# Patient Record
Sex: FEMALE | Race: WHITE | Employment: OTHER | URBAN - METROPOLITAN AREA
[De-identification: names, ages, dates, MRNs, and addresses within clinical notes are randomized per-mention and may not be internally consistent; named-entity substitution may affect disease eponyms.]

---

## 2017-01-02 DIAGNOSIS — L70.0 ACNE COMEDONE: ICD-10-CM

## 2017-01-03 RX ORDER — DOXYCYCLINE HYCLATE 100 MG
TABLET ORAL
Qty: 30 TABLET | Refills: 0 | Status: SHIPPED | OUTPATIENT
Start: 2017-01-03 | End: 2017-02-28

## 2017-02-06 DIAGNOSIS — K59.00 CONSTIPATION, UNSPECIFIED CONSTIPATION TYPE: ICD-10-CM

## 2017-02-06 DIAGNOSIS — Z76.0 MEDICATION REFILL: ICD-10-CM

## 2017-02-06 RX ORDER — ETHYL ALCOHOL 62 %
GEL (ML) TOPICAL
Qty: 60 TABLET | Refills: 0 | Status: SHIPPED | OUTPATIENT
Start: 2017-02-06 | End: 2017-02-28 | Stop reason: SDUPTHER

## 2017-02-22 ENCOUNTER — HOSPITAL ENCOUNTER (OUTPATIENT)
Age: 52
Discharge: HOME | End: 2017-02-22
Payer: MEDICARE

## 2017-02-22 ENCOUNTER — OFFICE VISIT (OUTPATIENT)
Dept: PODIATRY | Facility: CLINIC | Age: 52
End: 2017-02-22

## 2017-02-22 VITALS
DIASTOLIC BLOOD PRESSURE: 74 MMHG | BODY MASS INDEX: 22.18 KG/M2 | HEART RATE: 61 BPM | WEIGHT: 138 LBS | TEMPERATURE: 99.2 F | HEIGHT: 66 IN | SYSTOLIC BLOOD PRESSURE: 101 MMHG

## 2017-02-22 DIAGNOSIS — M21.619 BUNION: Primary | ICD-10-CM

## 2017-02-22 DIAGNOSIS — M79.671 RIGHT FOOT PAIN: ICD-10-CM

## 2017-02-22 DIAGNOSIS — J44.9 CHRONIC OBSTRUCTIVE PULMONARY DISEASE, UNSPECIFIED COPD TYPE (HCC): ICD-10-CM

## 2017-02-22 PROCEDURE — G8484 FLU IMMUNIZE NO ADMIN: HCPCS | Performed by: PODIATRIST

## 2017-02-22 PROCEDURE — 99213 OFFICE O/P EST LOW 20 MIN: CPT | Performed by: PODIATRIST

## 2017-02-22 PROCEDURE — G8420 CALC BMI NORM PARAMETERS: HCPCS | Performed by: PODIATRIST

## 2017-02-22 PROCEDURE — G8926 SPIRO NO PERF OR DOC: HCPCS | Performed by: PODIATRIST

## 2017-02-22 PROCEDURE — 3023F SPIROM DOC REV: CPT | Performed by: PODIATRIST

## 2017-02-22 PROCEDURE — 99212 OFFICE O/P EST SF 10 MIN: CPT | Performed by: PODIATRIST

## 2017-02-22 PROCEDURE — 1036F TOBACCO NON-USER: CPT | Performed by: PODIATRIST

## 2017-02-22 PROCEDURE — G8427 DOCREV CUR MEDS BY ELIG CLIN: HCPCS | Performed by: PODIATRIST

## 2017-02-22 PROCEDURE — 3017F COLORECTAL CA SCREEN DOC REV: CPT | Performed by: PODIATRIST

## 2017-02-22 PROCEDURE — 3014F SCREEN MAMMO DOC REV: CPT | Performed by: PODIATRIST

## 2017-02-28 ENCOUNTER — OFFICE VISIT (OUTPATIENT)
Dept: FAMILY MEDICINE CLINIC | Facility: CLINIC | Age: 52
End: 2017-02-28

## 2017-02-28 ENCOUNTER — HOSPITAL ENCOUNTER (OUTPATIENT)
Age: 52
Discharge: HOME | End: 2017-02-28
Payer: MEDICARE

## 2017-02-28 VITALS
SYSTOLIC BLOOD PRESSURE: 107 MMHG | HEART RATE: 81 BPM | BODY MASS INDEX: 22.63 KG/M2 | WEIGHT: 140.8 LBS | DIASTOLIC BLOOD PRESSURE: 80 MMHG | TEMPERATURE: 98.2 F | HEIGHT: 66 IN

## 2017-02-28 DIAGNOSIS — G89.29 CHRONIC RIGHT-SIDED LOW BACK PAIN WITHOUT SCIATICA: Primary | ICD-10-CM

## 2017-02-28 DIAGNOSIS — Q78.2 OSTEOPETROSIS: ICD-10-CM

## 2017-02-28 DIAGNOSIS — K59.00 CONSTIPATION, UNSPECIFIED CONSTIPATION TYPE: ICD-10-CM

## 2017-02-28 DIAGNOSIS — L70.9 ACNE, UNSPECIFIED ACNE TYPE: ICD-10-CM

## 2017-02-28 DIAGNOSIS — H92.02 EAR PAIN, LEFT: ICD-10-CM

## 2017-02-28 DIAGNOSIS — Z76.0 MEDICATION REFILL: ICD-10-CM

## 2017-02-28 DIAGNOSIS — L70.0 ACNE COMEDONE: ICD-10-CM

## 2017-02-28 DIAGNOSIS — M54.50 CHRONIC RIGHT-SIDED LOW BACK PAIN WITHOUT SCIATICA: Primary | ICD-10-CM

## 2017-02-28 DIAGNOSIS — Z00.00 HEALTHCARE MAINTENANCE: ICD-10-CM

## 2017-02-28 PROCEDURE — G8484 FLU IMMUNIZE NO ADMIN: HCPCS | Performed by: HOSPITALIST

## 2017-02-28 PROCEDURE — G8427 DOCREV CUR MEDS BY ELIG CLIN: HCPCS | Performed by: HOSPITALIST

## 2017-02-28 PROCEDURE — 1036F TOBACCO NON-USER: CPT | Performed by: HOSPITALIST

## 2017-02-28 PROCEDURE — 3017F COLORECTAL CA SCREEN DOC REV: CPT | Performed by: HOSPITALIST

## 2017-02-28 PROCEDURE — 99213 OFFICE O/P EST LOW 20 MIN: CPT | Performed by: FAMILY MEDICINE

## 2017-02-28 PROCEDURE — 99213 OFFICE O/P EST LOW 20 MIN: CPT | Performed by: HOSPITALIST

## 2017-02-28 PROCEDURE — 3014F SCREEN MAMMO DOC REV: CPT | Performed by: HOSPITALIST

## 2017-02-28 PROCEDURE — G8420 CALC BMI NORM PARAMETERS: HCPCS | Performed by: HOSPITALIST

## 2017-02-28 RX ORDER — TRETINOIN 0.5 MG/G
CREAM TOPICAL
Qty: 1 TUBE | Refills: 3 | Status: SHIPPED | OUTPATIENT
Start: 2017-02-28 | End: 2017-05-22 | Stop reason: SDUPTHER

## 2017-02-28 RX ORDER — FERROUS SULFATE 325(65) MG
TABLET ORAL
Qty: 30 TABLET | Refills: 3 | Status: SHIPPED | OUTPATIENT
Start: 2017-02-28 | End: 2017-05-22 | Stop reason: SDUPTHER

## 2017-02-28 RX ORDER — POLYETHYLENE GLYCOL 3350 17 G/17G
POWDER, FOR SOLUTION ORAL
Qty: 510 G | Refills: 3 | Status: SHIPPED | OUTPATIENT
Start: 2017-02-28 | End: 2017-05-22 | Stop reason: SDUPTHER

## 2017-02-28 RX ORDER — OMEPRAZOLE 40 MG/1
40 CAPSULE, DELAYED RELEASE ORAL DAILY
Qty: 30 CAPSULE | Refills: 3 | Status: SHIPPED | OUTPATIENT
Start: 2017-02-28 | End: 2017-08-08 | Stop reason: SDUPTHER

## 2017-02-28 RX ORDER — DOXYCYCLINE HYCLATE 100 MG
TABLET ORAL
Qty: 30 TABLET | Refills: 3 | Status: SHIPPED | OUTPATIENT
Start: 2017-02-28 | End: 2017-06-12 | Stop reason: SDUPTHER

## 2017-02-28 RX ORDER — MIDODRINE HYDROCHLORIDE 10 MG/1
10 TABLET ORAL 3 TIMES DAILY
Qty: 90 TABLET | Refills: 3 | Status: SHIPPED | OUTPATIENT
Start: 2017-02-28 | End: 2017-05-22 | Stop reason: SDUPTHER

## 2017-02-28 RX ORDER — AMOXICILLIN 250 MG
CAPSULE ORAL
Qty: 60 TABLET | Refills: 3 | Status: SHIPPED | OUTPATIENT
Start: 2017-02-28 | End: 2017-05-22 | Stop reason: SDUPTHER

## 2017-02-28 ASSESSMENT — ENCOUNTER SYMPTOMS
COUGH: 0
APNEA: 0
EYE DISCHARGE: 0
ABDOMINAL DISTENTION: 0
SHORTNESS OF BREATH: 0

## 2017-03-21 ENCOUNTER — HOSPITAL ENCOUNTER (OUTPATIENT)
Dept: PHYSICAL THERAPY | Age: 52
Setting detail: THERAPIES SERIES
Discharge: HOME | End: 2017-03-21
Payer: MEDICARE

## 2017-04-25 ENCOUNTER — HOSPITAL ENCOUNTER (OUTPATIENT)
Age: 52
Discharge: HOME | End: 2017-04-25
Payer: MEDICARE

## 2017-05-02 ENCOUNTER — HOSPITAL ENCOUNTER (OUTPATIENT)
Age: 52
Discharge: HOME | End: 2017-05-02
Payer: MEDICARE

## 2017-05-02 ENCOUNTER — TELEPHONE (OUTPATIENT)
Dept: FAMILY MEDICINE CLINIC | Age: 52
End: 2017-05-02

## 2017-05-02 DIAGNOSIS — Z00.00 HEALTHCARE MAINTENANCE: ICD-10-CM

## 2017-05-02 DIAGNOSIS — Z13.9 SCREENING: Primary | ICD-10-CM

## 2017-05-02 DIAGNOSIS — Z00.00 HEALTHCARE MAINTENANCE: Primary | ICD-10-CM

## 2017-05-02 DIAGNOSIS — Q78.2 OSTEOPETROSIS: ICD-10-CM

## 2017-05-02 DIAGNOSIS — G89.29 CHRONIC RIGHT-SIDED LOW BACK PAIN WITHOUT SCIATICA: ICD-10-CM

## 2017-05-02 DIAGNOSIS — M54.50 CHRONIC RIGHT-SIDED LOW BACK PAIN WITHOUT SCIATICA: ICD-10-CM

## 2017-05-02 LAB
CHOLESTEROL, FASTING: 245 MG/DL
CHOLESTEROL/HDL RATIO: 3.8
HDLC SERPL-MCNC: 65 MG/DL
HEPATITIS C ANTIBODY: NONREACTIVE
LDL CHOLESTEROL: 152 MG/DL (ref 0–130)
TRIGLYCERIDE, FASTING: 142 MG/DL
VITAMIN D 25-HYDROXY: 17 NG/ML (ref 30–100)
VLDLC SERPL CALC-MCNC: ABNORMAL MG/DL (ref 1–30)

## 2017-05-02 PROCEDURE — 80061 LIPID PANEL: CPT

## 2017-05-02 PROCEDURE — 86803 HEPATITIS C AB TEST: CPT

## 2017-05-02 PROCEDURE — 36415 COLL VENOUS BLD VENIPUNCTURE: CPT

## 2017-05-02 PROCEDURE — 82306 VITAMIN D 25 HYDROXY: CPT

## 2017-05-22 ENCOUNTER — OFFICE VISIT (OUTPATIENT)
Dept: FAMILY MEDICINE CLINIC | Age: 52
End: 2017-05-22
Payer: MEDICARE

## 2017-05-22 VITALS
WEIGHT: 134.8 LBS | TEMPERATURE: 99.5 F | BODY MASS INDEX: 21.66 KG/M2 | HEART RATE: 87 BPM | SYSTOLIC BLOOD PRESSURE: 120 MMHG | OXYGEN SATURATION: 100 % | DIASTOLIC BLOOD PRESSURE: 85 MMHG

## 2017-05-22 DIAGNOSIS — G89.29 CHRONIC MIDLINE LOW BACK PAIN WITHOUT SCIATICA: ICD-10-CM

## 2017-05-22 DIAGNOSIS — K59.00 CONSTIPATION, UNSPECIFIED CONSTIPATION TYPE: ICD-10-CM

## 2017-05-22 DIAGNOSIS — E55.9 VITAMIN D DEFICIENCY: ICD-10-CM

## 2017-05-22 DIAGNOSIS — Z76.0 MEDICATION REFILL: ICD-10-CM

## 2017-05-22 DIAGNOSIS — L70.0 ACNE COMEDONE: Primary | ICD-10-CM

## 2017-05-22 DIAGNOSIS — E78.00 HIGH CHOLESTEROL: ICD-10-CM

## 2017-05-22 DIAGNOSIS — M54.50 CHRONIC MIDLINE LOW BACK PAIN WITHOUT SCIATICA: ICD-10-CM

## 2017-05-22 PROCEDURE — G8428 CUR MEDS NOT DOCUMENT: HCPCS | Performed by: HOSPITALIST

## 2017-05-22 PROCEDURE — 3014F SCREEN MAMMO DOC REV: CPT | Performed by: HOSPITALIST

## 2017-05-22 PROCEDURE — G8420 CALC BMI NORM PARAMETERS: HCPCS | Performed by: HOSPITALIST

## 2017-05-22 PROCEDURE — 99213 OFFICE O/P EST LOW 20 MIN: CPT | Performed by: HOSPITALIST

## 2017-05-22 PROCEDURE — 1036F TOBACCO NON-USER: CPT | Performed by: HOSPITALIST

## 2017-05-22 PROCEDURE — 3017F COLORECTAL CA SCREEN DOC REV: CPT | Performed by: HOSPITALIST

## 2017-05-22 RX ORDER — FERROUS SULFATE 325(65) MG
TABLET ORAL
Qty: 30 TABLET | Refills: 3 | Status: SHIPPED | OUTPATIENT
Start: 2017-05-22

## 2017-05-22 RX ORDER — ATORVASTATIN CALCIUM 20 MG/1
20 TABLET, FILM COATED ORAL DAILY
Qty: 30 TABLET | Refills: 3 | Status: SHIPPED | OUTPATIENT
Start: 2017-05-22 | End: 2017-08-08 | Stop reason: SDUPTHER

## 2017-05-22 RX ORDER — ERGOCALCIFEROL 1.25 MG/1
50000 CAPSULE ORAL WEEKLY
Qty: 8 CAPSULE | Refills: 0 | Status: SHIPPED | OUTPATIENT
Start: 2017-05-22 | End: 2017-09-18 | Stop reason: ALTCHOICE

## 2017-05-22 RX ORDER — DOXYCYCLINE HYCLATE 150 MG/1
150 TABLET ORAL 2 TIMES DAILY
Qty: 20 TABLET | Refills: 0 | Status: SHIPPED | OUTPATIENT
Start: 2017-05-22 | End: 2017-05-26 | Stop reason: SDUPTHER

## 2017-05-22 RX ORDER — TRETINOIN 0.5 MG/G
CREAM TOPICAL
Qty: 1 TUBE | Refills: 3 | Status: SHIPPED | OUTPATIENT
Start: 2017-05-22 | End: 2017-08-08 | Stop reason: SDUPTHER

## 2017-05-22 RX ORDER — AMOXICILLIN 250 MG
CAPSULE ORAL
Qty: 60 TABLET | Refills: 3 | Status: SHIPPED | OUTPATIENT
Start: 2017-05-22 | End: 2017-08-08 | Stop reason: SDUPTHER

## 2017-05-22 RX ORDER — POLYETHYLENE GLYCOL 3350 17 G/17G
POWDER, FOR SOLUTION ORAL
Qty: 510 G | Refills: 3 | Status: SHIPPED | OUTPATIENT
Start: 2017-05-22 | End: 2017-08-08 | Stop reason: SDUPTHER

## 2017-05-22 RX ORDER — MIDODRINE HYDROCHLORIDE 10 MG/1
10 TABLET ORAL 3 TIMES DAILY
Qty: 90 TABLET | Refills: 3 | Status: SHIPPED | OUTPATIENT
Start: 2017-05-22 | End: 2017-08-08 | Stop reason: SDUPTHER

## 2017-05-22 RX ORDER — DOXYCYCLINE HYCLATE 100 MG
TABLET ORAL
Qty: 30 TABLET | Refills: 3 | Status: CANCELLED | OUTPATIENT
Start: 2017-05-22

## 2017-05-22 ASSESSMENT — ENCOUNTER SYMPTOMS
BACK PAIN: 1
COUGH: 0
EYE PAIN: 0
SHORTNESS OF BREATH: 0
APNEA: 0
EYE DISCHARGE: 0
ABDOMINAL DISTENTION: 0
BLOOD IN STOOL: 0

## 2017-06-01 ENCOUNTER — TELEPHONE (OUTPATIENT)
Dept: FAMILY MEDICINE CLINIC | Age: 52
End: 2017-06-01

## 2017-06-01 RX ORDER — DOXYCYCLINE HYCLATE 150 MG/1
150 TABLET ORAL 2 TIMES DAILY
Qty: 60 TABLET | Refills: 0 | Status: SHIPPED | OUTPATIENT
Start: 2017-06-01 | End: 2017-06-01 | Stop reason: SDUPTHER

## 2017-07-17 ENCOUNTER — TELEPHONE (OUTPATIENT)
Dept: FAMILY MEDICINE CLINIC | Age: 52
End: 2017-07-17

## 2017-07-18 ENCOUNTER — OFFICE VISIT (OUTPATIENT)
Dept: PULMONOLOGY | Age: 52
End: 2017-07-18
Payer: MEDICARE

## 2017-07-18 VITALS
SYSTOLIC BLOOD PRESSURE: 110 MMHG | OXYGEN SATURATION: 100 % | HEART RATE: 68 BPM | HEIGHT: 66 IN | RESPIRATION RATE: 16 BRPM | WEIGHT: 133 LBS | BODY MASS INDEX: 21.38 KG/M2 | TEMPERATURE: 98.1 F | DIASTOLIC BLOOD PRESSURE: 75 MMHG

## 2017-07-18 DIAGNOSIS — J44.9 ASTHMATIC BRONCHITIS , CHRONIC (HCC): Primary | ICD-10-CM

## 2017-07-18 DIAGNOSIS — J44.9 CHRONIC OBSTRUCTIVE PULMONARY DISEASE, UNSPECIFIED COPD TYPE (HCC): ICD-10-CM

## 2017-07-18 DIAGNOSIS — R91.1 LUNG NODULE: ICD-10-CM

## 2017-07-18 DIAGNOSIS — J86.9 EMPYEMA (HCC): ICD-10-CM

## 2017-07-18 PROCEDURE — 1036F TOBACCO NON-USER: CPT | Performed by: INTERNAL MEDICINE

## 2017-07-18 PROCEDURE — G8926 SPIRO NO PERF OR DOC: HCPCS | Performed by: INTERNAL MEDICINE

## 2017-07-18 PROCEDURE — 3017F COLORECTAL CA SCREEN DOC REV: CPT | Performed by: INTERNAL MEDICINE

## 2017-07-18 PROCEDURE — G8420 CALC BMI NORM PARAMETERS: HCPCS | Performed by: INTERNAL MEDICINE

## 2017-07-18 PROCEDURE — 3023F SPIROM DOC REV: CPT | Performed by: INTERNAL MEDICINE

## 2017-07-18 PROCEDURE — 99214 OFFICE O/P EST MOD 30 MIN: CPT | Performed by: INTERNAL MEDICINE

## 2017-07-18 PROCEDURE — 3014F SCREEN MAMMO DOC REV: CPT | Performed by: INTERNAL MEDICINE

## 2017-07-18 PROCEDURE — G8427 DOCREV CUR MEDS BY ELIG CLIN: HCPCS | Performed by: INTERNAL MEDICINE

## 2017-07-25 ENCOUNTER — HOSPITAL ENCOUNTER (OUTPATIENT)
Age: 52
Discharge: HOME | End: 2017-07-25
Payer: MEDICARE

## 2017-07-25 ENCOUNTER — HOSPITAL ENCOUNTER (OUTPATIENT)
Dept: MAMMOGRAPHY | Age: 52
Discharge: HOME | End: 2017-07-25
Payer: MEDICARE

## 2017-07-25 ENCOUNTER — HOSPITAL ENCOUNTER (OUTPATIENT)
Dept: GENERAL RADIOLOGY | Age: 52
Discharge: HOME | End: 2017-07-25
Payer: MEDICARE

## 2017-07-25 DIAGNOSIS — M54.50 CHRONIC MIDLINE LOW BACK PAIN WITHOUT SCIATICA: ICD-10-CM

## 2017-07-25 DIAGNOSIS — Z12.31 VISIT FOR SCREENING MAMMOGRAM: ICD-10-CM

## 2017-07-25 DIAGNOSIS — G89.29 CHRONIC MIDLINE LOW BACK PAIN WITHOUT SCIATICA: ICD-10-CM

## 2017-07-25 PROCEDURE — 72110 X-RAY EXAM L-2 SPINE 4/>VWS: CPT

## 2017-07-25 PROCEDURE — 77063 BREAST TOMOSYNTHESIS BI: CPT

## 2017-08-01 RX ORDER — TIOTROPIUM BROMIDE 18 UG/1
CAPSULE ORAL; RESPIRATORY (INHALATION)
Qty: 30 CAPSULE | Refills: 6 | Status: SHIPPED | OUTPATIENT
Start: 2017-08-01 | End: 2018-01-23 | Stop reason: ALTCHOICE

## 2017-08-08 ENCOUNTER — OFFICE VISIT (OUTPATIENT)
Dept: FAMILY MEDICINE CLINIC | Age: 52
End: 2017-08-08
Payer: MEDICARE

## 2017-08-08 VITALS
WEIGHT: 134.6 LBS | HEIGHT: 66 IN | TEMPERATURE: 98.1 F | OXYGEN SATURATION: 99 % | HEART RATE: 74 BPM | BODY MASS INDEX: 21.63 KG/M2 | DIASTOLIC BLOOD PRESSURE: 81 MMHG | SYSTOLIC BLOOD PRESSURE: 109 MMHG

## 2017-08-08 DIAGNOSIS — Z00.00 HEALTHCARE MAINTENANCE: Primary | ICD-10-CM

## 2017-08-08 DIAGNOSIS — E55.9 VITAMIN D DEFICIENCY: ICD-10-CM

## 2017-08-08 DIAGNOSIS — K59.00 CONSTIPATION, UNSPECIFIED CONSTIPATION TYPE: ICD-10-CM

## 2017-08-08 DIAGNOSIS — L70.0 ACNE COMEDONE: ICD-10-CM

## 2017-08-08 DIAGNOSIS — Z76.0 MEDICATION REFILL: ICD-10-CM

## 2017-08-08 DIAGNOSIS — E78.00 HIGH CHOLESTEROL: ICD-10-CM

## 2017-08-08 PROCEDURE — G8427 DOCREV CUR MEDS BY ELIG CLIN: HCPCS | Performed by: HOSPITALIST

## 2017-08-08 PROCEDURE — 3014F SCREEN MAMMO DOC REV: CPT | Performed by: HOSPITALIST

## 2017-08-08 PROCEDURE — 99213 OFFICE O/P EST LOW 20 MIN: CPT | Performed by: HOSPITALIST

## 2017-08-08 PROCEDURE — G8420 CALC BMI NORM PARAMETERS: HCPCS | Performed by: HOSPITALIST

## 2017-08-08 PROCEDURE — 1036F TOBACCO NON-USER: CPT | Performed by: HOSPITALIST

## 2017-08-08 PROCEDURE — 99213 OFFICE O/P EST LOW 20 MIN: CPT

## 2017-08-08 PROCEDURE — 3017F COLORECTAL CA SCREEN DOC REV: CPT | Performed by: HOSPITALIST

## 2017-08-08 RX ORDER — DOXYCYCLINE HYCLATE 100 MG
TABLET ORAL
Qty: 30 TABLET | Refills: 3 | Status: SHIPPED | OUTPATIENT
Start: 2017-08-08 | End: 2017-09-07 | Stop reason: SDUPTHER

## 2017-08-08 RX ORDER — MIDODRINE HYDROCHLORIDE 10 MG/1
10 TABLET ORAL 3 TIMES DAILY
Qty: 90 TABLET | Refills: 3 | Status: SHIPPED | OUTPATIENT
Start: 2017-08-08 | End: 2017-09-07 | Stop reason: SDUPTHER

## 2017-08-08 RX ORDER — TRETINOIN 0.5 MG/G
CREAM TOPICAL
Qty: 1 TUBE | Refills: 3 | Status: SHIPPED | OUTPATIENT
Start: 2017-08-08 | End: 2017-09-07 | Stop reason: SDUPTHER

## 2017-08-08 RX ORDER — ATORVASTATIN CALCIUM 20 MG/1
20 TABLET, FILM COATED ORAL DAILY
Qty: 30 TABLET | Refills: 3 | Status: SHIPPED | OUTPATIENT
Start: 2017-08-08 | End: 2017-09-07 | Stop reason: SDUPTHER

## 2017-08-08 RX ORDER — AMOXICILLIN 250 MG
CAPSULE ORAL
Qty: 60 TABLET | Refills: 3 | Status: SHIPPED | OUTPATIENT
Start: 2017-08-08 | End: 2017-09-07 | Stop reason: SDUPTHER

## 2017-08-08 RX ORDER — OMEPRAZOLE 40 MG/1
40 CAPSULE, DELAYED RELEASE ORAL DAILY
Qty: 30 CAPSULE | Refills: 3 | Status: SHIPPED | OUTPATIENT
Start: 2017-08-08 | End: 2017-09-18 | Stop reason: DRUGHIGH

## 2017-08-08 RX ORDER — POLYETHYLENE GLYCOL 3350 17 G/17G
POWDER, FOR SOLUTION ORAL
Qty: 510 G | Refills: 3 | Status: SHIPPED | OUTPATIENT
Start: 2017-08-08 | End: 2017-12-21 | Stop reason: SDUPTHER

## 2017-08-08 ASSESSMENT — ENCOUNTER SYMPTOMS
WHEEZING: 0
APNEA: 0
SHORTNESS OF BREATH: 0
ABDOMINAL DISTENTION: 0
COUGH: 0

## 2017-08-08 ASSESSMENT — PATIENT HEALTH QUESTIONNAIRE - PHQ9
2. FEELING DOWN, DEPRESSED OR HOPELESS: 0
SUM OF ALL RESPONSES TO PHQ QUESTIONS 1-9: 0
1. LITTLE INTEREST OR PLEASURE IN DOING THINGS: 0
SUM OF ALL RESPONSES TO PHQ9 QUESTIONS 1 & 2: 0

## 2017-08-22 ENCOUNTER — OFFICE VISIT (OUTPATIENT)
Dept: FAMILY MEDICINE CLINIC | Age: 52
End: 2017-08-22
Payer: MEDICARE

## 2017-08-22 ENCOUNTER — HOSPITAL ENCOUNTER (OUTPATIENT)
Age: 52
Setting detail: SPECIMEN
Discharge: HOME | End: 2017-08-22
Payer: MEDICARE

## 2017-08-22 ENCOUNTER — TELEPHONE (OUTPATIENT)
Dept: PHARMACY | Age: 52
End: 2017-08-22

## 2017-08-22 VITALS
TEMPERATURE: 99.3 F | HEART RATE: 71 BPM | WEIGHT: 135.4 LBS | SYSTOLIC BLOOD PRESSURE: 108 MMHG | DIASTOLIC BLOOD PRESSURE: 75 MMHG | BODY MASS INDEX: 21.76 KG/M2

## 2017-08-22 DIAGNOSIS — Z00.00 HEALTHCARE MAINTENANCE: ICD-10-CM

## 2017-08-22 DIAGNOSIS — J06.9 VIRAL URI: ICD-10-CM

## 2017-08-22 DIAGNOSIS — H61.21 HEARING LOSS DUE TO CERUMEN IMPACTION, RIGHT: Primary | ICD-10-CM

## 2017-08-22 DIAGNOSIS — E55.9 VITAMIN D DEFICIENCY: ICD-10-CM

## 2017-08-22 PROBLEM — H61.20 HEARING LOSS DUE TO CERUMEN IMPACTION: Status: ACTIVE | Noted: 2017-08-22

## 2017-08-22 LAB
TSH SERPL DL<=0.05 MIU/L-ACNC: 0.75 MIU/L (ref 0.3–5)
VITAMIN D 25-HYDROXY: 28.8 NG/ML (ref 30–100)

## 2017-08-22 PROCEDURE — G8427 DOCREV CUR MEDS BY ELIG CLIN: HCPCS | Performed by: STUDENT IN AN ORGANIZED HEALTH CARE EDUCATION/TRAINING PROGRAM

## 2017-08-22 PROCEDURE — G8420 CALC BMI NORM PARAMETERS: HCPCS | Performed by: STUDENT IN AN ORGANIZED HEALTH CARE EDUCATION/TRAINING PROGRAM

## 2017-08-22 PROCEDURE — 3017F COLORECTAL CA SCREEN DOC REV: CPT | Performed by: STUDENT IN AN ORGANIZED HEALTH CARE EDUCATION/TRAINING PROGRAM

## 2017-08-22 PROCEDURE — 99213 OFFICE O/P EST LOW 20 MIN: CPT

## 2017-08-22 PROCEDURE — 99213 OFFICE O/P EST LOW 20 MIN: CPT | Performed by: STUDENT IN AN ORGANIZED HEALTH CARE EDUCATION/TRAINING PROGRAM

## 2017-08-22 PROCEDURE — 1036F TOBACCO NON-USER: CPT | Performed by: STUDENT IN AN ORGANIZED HEALTH CARE EDUCATION/TRAINING PROGRAM

## 2017-08-22 PROCEDURE — 3014F SCREEN MAMMO DOC REV: CPT | Performed by: STUDENT IN AN ORGANIZED HEALTH CARE EDUCATION/TRAINING PROGRAM

## 2017-08-22 RX ORDER — FEXOFENADINE HCL 180 MG/1
180 TABLET ORAL DAILY
Qty: 14 TABLET | Refills: 0 | Status: SHIPPED | OUTPATIENT
Start: 2017-08-22 | End: 2017-09-05

## 2017-08-22 ASSESSMENT — ENCOUNTER SYMPTOMS
NAUSEA: 1
ABDOMINAL PAIN: 0
SHORTNESS OF BREATH: 1
COUGH: 0
VOMITING: 0
RHINORRHEA: 0
SORE THROAT: 0

## 2017-09-01 DIAGNOSIS — Z76.0 MEDICATION REFILL: ICD-10-CM

## 2017-09-02 RX ORDER — OMEPRAZOLE 20 MG/1
CAPSULE, DELAYED RELEASE ORAL
Qty: 60 CAPSULE | Refills: 3 | Status: SHIPPED | OUTPATIENT
Start: 2017-09-02 | End: 2017-12-21 | Stop reason: SDUPTHER

## 2017-09-07 ENCOUNTER — OFFICE VISIT (OUTPATIENT)
Dept: FAMILY MEDICINE CLINIC | Age: 52
End: 2017-09-07
Payer: MEDICARE

## 2017-09-07 VITALS
HEIGHT: 66 IN | DIASTOLIC BLOOD PRESSURE: 77 MMHG | SYSTOLIC BLOOD PRESSURE: 108 MMHG | TEMPERATURE: 98.4 F | HEART RATE: 67 BPM | BODY MASS INDEX: 21.89 KG/M2 | WEIGHT: 136.2 LBS

## 2017-09-07 DIAGNOSIS — L70.0 ACNE COMEDONE: ICD-10-CM

## 2017-09-07 DIAGNOSIS — H61.22 IMPACTED CERUMEN OF LEFT EAR: ICD-10-CM

## 2017-09-07 DIAGNOSIS — L70.9 ACNE, UNSPECIFIED ACNE TYPE: Primary | ICD-10-CM

## 2017-09-07 DIAGNOSIS — K59.00 CONSTIPATION, UNSPECIFIED CONSTIPATION TYPE: ICD-10-CM

## 2017-09-07 DIAGNOSIS — Z76.0 MEDICATION REFILL: ICD-10-CM

## 2017-09-07 DIAGNOSIS — F17.200 SMOKING: ICD-10-CM

## 2017-09-07 DIAGNOSIS — E78.00 HIGH CHOLESTEROL: ICD-10-CM

## 2017-09-07 PROCEDURE — 99213 OFFICE O/P EST LOW 20 MIN: CPT | Performed by: HOSPITALIST

## 2017-09-07 PROCEDURE — 99214 OFFICE O/P EST MOD 30 MIN: CPT

## 2017-09-07 RX ORDER — TRETINOIN 0.5 MG/G
CREAM TOPICAL
Qty: 1 TUBE | Refills: 3 | Status: SHIPPED | OUTPATIENT
Start: 2017-09-07 | End: 2017-09-18 | Stop reason: ALTCHOICE

## 2017-09-07 RX ORDER — ATORVASTATIN CALCIUM 20 MG/1
20 TABLET, FILM COATED ORAL DAILY
Qty: 30 TABLET | Refills: 3 | Status: SHIPPED | OUTPATIENT
Start: 2017-09-07 | End: 2017-12-21 | Stop reason: SDUPTHER

## 2017-09-07 RX ORDER — MIDODRINE HYDROCHLORIDE 10 MG/1
10 TABLET ORAL 3 TIMES DAILY
Qty: 90 TABLET | Refills: 3 | Status: SHIPPED | OUTPATIENT
Start: 2017-09-07 | End: 2017-12-21 | Stop reason: SDUPTHER

## 2017-09-07 RX ORDER — AMOXICILLIN 250 MG
CAPSULE ORAL
Qty: 60 TABLET | Refills: 3 | Status: SHIPPED | OUTPATIENT
Start: 2017-09-07 | End: 2017-12-21 | Stop reason: SDUPTHER

## 2017-09-07 RX ORDER — FLUOXETINE HYDROCHLORIDE 40 MG/1
CAPSULE ORAL
Refills: 0 | COMMUNITY
Start: 2017-08-14 | End: 2018-05-22 | Stop reason: ALTCHOICE

## 2017-09-07 RX ORDER — DOXYCYCLINE HYCLATE 100 MG
TABLET ORAL
Qty: 30 TABLET | Refills: 3 | Status: SHIPPED | OUTPATIENT
Start: 2017-09-07 | End: 2017-12-21 | Stop reason: SDUPTHER

## 2017-09-07 ASSESSMENT — ENCOUNTER SYMPTOMS
SHORTNESS OF BREATH: 0
CHEST TIGHTNESS: 0
WHEEZING: 0
ABDOMINAL DISTENTION: 0

## 2017-09-18 ENCOUNTER — OFFICE VISIT (OUTPATIENT)
Dept: DERMATOLOGY | Age: 52
End: 2017-09-18
Payer: MEDICARE

## 2017-09-18 VITALS
BODY MASS INDEX: 22.02 KG/M2 | DIASTOLIC BLOOD PRESSURE: 78 MMHG | OXYGEN SATURATION: 98 % | HEIGHT: 66 IN | HEART RATE: 70 BPM | SYSTOLIC BLOOD PRESSURE: 112 MMHG | WEIGHT: 137 LBS

## 2017-09-18 DIAGNOSIS — L70.0 CYSTIC ACNE: Primary | ICD-10-CM

## 2017-09-18 PROCEDURE — 3014F SCREEN MAMMO DOC REV: CPT | Performed by: DERMATOLOGY

## 2017-09-18 PROCEDURE — 1036F TOBACCO NON-USER: CPT | Performed by: DERMATOLOGY

## 2017-09-18 PROCEDURE — 3017F COLORECTAL CA SCREEN DOC REV: CPT | Performed by: DERMATOLOGY

## 2017-09-18 PROCEDURE — G8427 DOCREV CUR MEDS BY ELIG CLIN: HCPCS | Performed by: DERMATOLOGY

## 2017-09-18 PROCEDURE — 99202 OFFICE O/P NEW SF 15 MIN: CPT | Performed by: DERMATOLOGY

## 2017-09-18 PROCEDURE — G8420 CALC BMI NORM PARAMETERS: HCPCS | Performed by: DERMATOLOGY

## 2017-09-18 RX ORDER — ERYTHROMYCIN AND BENZOYL PEROXIDE 30; 50 MG/G; MG/G
GEL TOPICAL
Qty: 46.6 G | Refills: 3 | Status: SHIPPED | OUTPATIENT
Start: 2017-09-18 | End: 2017-10-18

## 2017-09-18 RX ORDER — TRETINOIN 0.25 MG/G
GEL TOPICAL
Qty: 45 G | Refills: 2 | Status: SHIPPED | OUTPATIENT
Start: 2017-09-18 | End: 2017-12-16 | Stop reason: SDUPTHER

## 2017-09-28 ENCOUNTER — TELEPHONE (OUTPATIENT)
Dept: ADMINISTRATIVE | Age: 52
End: 2017-09-28

## 2017-10-11 RX ORDER — ALBUTEROL SULFATE 90 UG/1
2 AEROSOL, METERED RESPIRATORY (INHALATION) EVERY 6 HOURS PRN
Qty: 1 INHALER | Refills: 3 | Status: SHIPPED | OUTPATIENT
Start: 2017-10-11 | End: 2022-04-19 | Stop reason: SDUPTHER

## 2017-11-16 DIAGNOSIS — M54.50 BILATERAL LOW BACK PAIN WITHOUT SCIATICA: ICD-10-CM

## 2017-11-16 DIAGNOSIS — Z76.0 MEDICATION REFILL: ICD-10-CM

## 2017-11-16 RX ORDER — TIZANIDINE 4 MG/1
TABLET ORAL
Qty: 30 TABLET | Refills: 2 | Status: SHIPPED | OUTPATIENT
Start: 2017-11-16 | End: 2018-05-22 | Stop reason: ALTCHOICE

## 2017-11-16 RX ORDER — IBUPROFEN 600 MG/1
TABLET ORAL
Qty: 30 TABLET | Refills: 3 | Status: SHIPPED | OUTPATIENT
Start: 2017-11-16 | End: 2018-11-20 | Stop reason: SDUPTHER

## 2017-11-29 ENCOUNTER — TELEPHONE (OUTPATIENT)
Dept: PULMONOLOGY | Age: 52
End: 2017-11-29

## 2017-12-11 ENCOUNTER — APPOINTMENT (OUTPATIENT)
Dept: GENERAL RADIOLOGY | Age: 52
End: 2017-12-11
Payer: MEDICARE

## 2017-12-11 ENCOUNTER — HOSPITAL ENCOUNTER (EMERGENCY)
Age: 52
Discharge: HOME | End: 2017-12-12
Attending: EMERGENCY MEDICINE
Payer: MEDICARE

## 2017-12-11 VITALS
DIASTOLIC BLOOD PRESSURE: 84 MMHG | TEMPERATURE: 98 F | HEART RATE: 97 BPM | OXYGEN SATURATION: 97 % | SYSTOLIC BLOOD PRESSURE: 137 MMHG | RESPIRATION RATE: 13 BRPM

## 2017-12-11 DIAGNOSIS — T78.3XXA ANGIOEDEMA, INITIAL ENCOUNTER: Primary | ICD-10-CM

## 2017-12-11 LAB
ABSOLUTE EOS #: 0.12 K/UL (ref 0–0.44)
ABSOLUTE IMMATURE GRANULOCYTE: 0.07 K/UL (ref 0–0.3)
ABSOLUTE LYMPH #: 2.04 K/UL (ref 1.1–3.7)
ABSOLUTE MONO #: 1.27 K/UL (ref 0.1–1.2)
ANION GAP SERPL CALCULATED.3IONS-SCNC: 16 MMOL/L (ref 9–17)
BASOPHILS # BLD: 0 % (ref 0–2)
BASOPHILS ABSOLUTE: 0.07 K/UL (ref 0–0.2)
BUN BLDV-MCNC: 15 MG/DL (ref 6–20)
BUN/CREAT BLD: ABNORMAL (ref 9–20)
CALCIUM SERPL-MCNC: 8.4 MG/DL (ref 8.6–10.4)
CHLORIDE BLD-SCNC: 101 MMOL/L (ref 98–107)
CO2: 21 MMOL/L (ref 20–31)
CREAT SERPL-MCNC: 0.56 MG/DL (ref 0.5–0.9)
DIFFERENTIAL TYPE: ABNORMAL
EKG ATRIAL RATE: 94 BPM
EKG P AXIS: 24 DEGREES
EKG P-R INTERVAL: 164 MS
EKG Q-T INTERVAL: 364 MS
EKG QRS DURATION: 80 MS
EKG QTC CALCULATION (BAZETT): 455 MS
EKG R AXIS: -29 DEGREES
EKG T AXIS: 30 DEGREES
EKG VENTRICULAR RATE: 94 BPM
EOSINOPHILS RELATIVE PERCENT: 1 % (ref 1–4)
GFR AFRICAN AMERICAN: >60 ML/MIN
GFR NON-AFRICAN AMERICAN: >60 ML/MIN
GFR SERPL CREATININE-BSD FRML MDRD: ABNORMAL ML/MIN/{1.73_M2}
GFR SERPL CREATININE-BSD FRML MDRD: ABNORMAL ML/MIN/{1.73_M2}
GLUCOSE BLD-MCNC: 155 MG/DL (ref 70–99)
HCT VFR BLD CALC: 42.2 % (ref 36.3–47.1)
HEMOGLOBIN: 13.7 G/DL (ref 11.9–15.1)
IMMATURE GRANULOCYTES: 0 %
LYMPHOCYTES # BLD: 12 % (ref 24–43)
MCH RBC QN AUTO: 30.7 PG (ref 25.2–33.5)
MCHC RBC AUTO-ENTMCNC: 32.5 G/DL (ref 28.4–34.8)
MCV RBC AUTO: 94.6 FL (ref 82.6–102.9)
MONOCYTES # BLD: 7 % (ref 3–12)
PDW BLD-RTO: 11.5 % (ref 11.8–14.4)
PLATELET # BLD: 362 K/UL (ref 138–453)
PLATELET ESTIMATE: ABNORMAL
PMV BLD AUTO: 9.7 FL (ref 8.1–13.5)
POTASSIUM SERPL-SCNC: 3.4 MMOL/L (ref 3.7–5.3)
RBC # BLD: 4.46 M/UL (ref 3.95–5.11)
RBC # BLD: ABNORMAL 10*6/UL
SEG NEUTROPHILS: 80 % (ref 36–65)
SEGMENTED NEUTROPHILS ABSOLUTE COUNT: 13.79 K/UL (ref 1.5–8.1)
SODIUM BLD-SCNC: 138 MMOL/L (ref 135–144)
TROPONIN INTERP: NORMAL
TROPONIN T: <0.03 NG/ML
WBC # BLD: 17.4 K/UL (ref 3.5–11.3)
WBC # BLD: ABNORMAL 10*3/UL

## 2017-12-11 PROCEDURE — 93005 ELECTROCARDIOGRAM TRACING: CPT

## 2017-12-11 PROCEDURE — 2500000003 HC RX 250 WO HCPCS: Performed by: EMERGENCY MEDICINE

## 2017-12-11 PROCEDURE — 85025 COMPLETE CBC W/AUTO DIFF WBC: CPT

## 2017-12-11 PROCEDURE — 99283 EMERGENCY DEPT VISIT LOW MDM: CPT

## 2017-12-11 PROCEDURE — S0028 INJECTION, FAMOTIDINE, 20 MG: HCPCS | Performed by: EMERGENCY MEDICINE

## 2017-12-11 PROCEDURE — 96374 THER/PROPH/DIAG INJ IV PUSH: CPT

## 2017-12-11 PROCEDURE — 80048 BASIC METABOLIC PNL TOTAL CA: CPT

## 2017-12-11 PROCEDURE — 84484 ASSAY OF TROPONIN QUANT: CPT

## 2017-12-11 PROCEDURE — 71020 XR CHEST STANDARD TWO VW: CPT

## 2017-12-11 RX ADMIN — FAMOTIDINE 20 MG: 10 INJECTION, SOLUTION INTRAVENOUS at 23:20

## 2017-12-12 LAB
TROPONIN INTERP: NORMAL
TROPONIN T: <0.03 NG/ML

## 2017-12-12 PROCEDURE — 6360000002 HC RX W HCPCS: Performed by: EMERGENCY MEDICINE

## 2017-12-12 PROCEDURE — 84484 ASSAY OF TROPONIN QUANT: CPT

## 2017-12-12 RX ORDER — EPINEPHRINE 1 MG/ML
0.3 INJECTION, SOLUTION, CONCENTRATE INTRAVENOUS ONCE
Status: COMPLETED | OUTPATIENT
Start: 2017-12-12 | End: 2017-12-12

## 2017-12-12 RX ADMIN — EPINEPHRINE 0.3 MG: 1 INJECTION INTRAMUSCULAR; INTRAVENOUS; SUBCUTANEOUS at 00:06

## 2017-12-12 ASSESSMENT — ENCOUNTER SYMPTOMS
FACIAL SWELLING: 1
CONSTIPATION: 0
SHORTNESS OF BREATH: 0
DIARRHEA: 0
SORE THROAT: 0
VOICE CHANGE: 0
TROUBLE SWALLOWING: 0
ABDOMINAL PAIN: 0
STRIDOR: 0
WHEEZING: 0
NAUSEA: 0
VOMITING: 0
COUGH: 0

## 2017-12-12 NOTE — ED PROVIDER NOTES
[facility] ED  Emergency Department Encounter  Emergency Medicine Resident     Pt Name: Donald Stock  MRN: [id number]  [place] 1965  Date of evaluation: 12/11/17  PCP:  Marie Wade MD    [address] [place] [address]       Chief Complaint   Patient presents with    Allergic Reaction    Facial Swelling    Urticaria       HISTORY OF PRESENT ILLNESS  (Location/Symptom, Timing/Onset, Context/Setting, Quality, Duration, Modifying Factors, Severity.)      Donald Stock is a 47 y.o. female who presents with Angioedema and urticaria. Patient has a history of Sesame seed allergy with associated anaphylaxis and what she describes as throat swelling. Patient states that she had a granola bar earlier today went down to take a nap. Patient states that after she woke up 2 hours later she noticed facial swelling without any shortness of breath or airway involvement. Patient states that since that time she developed she describes as chest tightness stating that she doesn't want to talk due to the chest tightness. Patient denies any lower extremity edema or edema in the upper extremities. Patient also developed an urticarial rash over the neck chest and abdomen. Patient states that she's never had a reaction to this granola bar before. Patient denies any difficulty swallowing or difficulty tolerating her secretions. Patient denies any difficulty speaking other than what she describes the chest tightness. She denies any lingual swelling. PAST MEDICAL / SURGICAL / SOCIAL / FAMILY HISTORY      has a past medical history of Vargas's esophagus; Bipolar disorder ([facility] [address] Depression; Eating disorder; Empyema ([facility] [address] and Substance abuse.     has a past surgical history that includes Hysterectomy (1988); Thoracoscopy (2/17/14); and Upper gastrointestinal endoscopy (10/13/2014).     Social History     Social History    Marital status: Single     Spouse name: N/A    Number of children: 1    Years of education: N/A     Occupational History    Disabled      Social History Main Topics    Smoking status: Former Smoker     Packs/day: 1.00     Years: 34.00     Types: Cigarettes     Start date: 6/30/1977     Quit date: 2/28/2014    Smokeless tobacco: Never Used    Alcohol use Yes      Comment: quit 38 days ago after 30 plus years of heavy abuse    Drug use: No      Comment: past hx of cocaine, marijuana, denies any IVDA    Sexual activity: No     Other Topics Concern    Not on file     Social History Narrative    No narrative on file       Family History   Problem Relation Age of Onset    Asthma Mother     Stroke Father     Heart Attack Father     Cancer Maternal Grandmother      skin cancer    Diabetes Maternal Uncle        Allergies:  Sesame [oleum sesami] and Codeine    Home Medications:  Prior to Admission medications    Medication Sig Start Date End Date Taking? Authorizing Provider   ibuprofen (ADVIL;MOTRIN) 600 MG tablet take 1 tablet by mouth every 6 hours if needed for pain 11/16/17   Sondra Garcia MD   tiZANidine (ZANAFLEX) 4 MG tablet take 1 tablet by mouth three times a day 11/16/17   Sondra Garcia MD   albuterol sulfate HFA (VENTOLIN HFA) 108 (90 Base) MCG/ACT inhaler Inhale 2 puffs into the lungs every 6 hours as needed for Wheezing 10/11/17   Theron Braga MD   Cholecalciferol (VITAMIN D3) 5000 units TABS Take 1 tablet by mouth daily    Historical Provider, MD   tretinoin (RETIN-A) 0.025 % gel Apply topically nightly.  9/18/17   Maddie Padilla MD   FLUoxetine (PROZAC) 40 MG capsule  8/14/17   Historical Provider, MD   doxycycline hyclate (VIBRA-TABS) 100 MG tablet take 1 tablet by mouth once daily 9/7/17   Sondra Garcia MD   senna-docusate (RA P COL-RITE) 8.6-50 MG per tablet take 2 tablets by mouth once daily if needed for constipation 9/7/17   Sondra Garcia MD   midodrine (PROAMATINE) 10 MG tablet Take 1 tablet by mouth 3 times daily 9/7/17   Sondra Garcia MD Range    WBC 17.4 (H) 3.5 - 11.3 k/uL    RBC 4.46 3.95 - 5.11 m/uL    Hemoglobin 13.7 11.9 - 15.1 g/dL    Hematocrit 42.2 36.3 - 47.1 %    MCV 94.6 82.6 - 102.9 fL    MCH 30.7 25.2 - 33.5 pg    MCHC 32.5 28.4 - 34.8 g/dL    RDW 11.5 (L) 11.8 - 14.4 %    Platelets 683 826 - 732 k/uL    MPV 9.7 8.1 - 13.5 fL    Differential Type NOT REPORTED     Seg Neutrophils 80 (H) 36 - 65 %    Lymphocytes 12 (L) 24 - 43 %    Monocytes 7 3 - 12 %    Eosinophils % 1 1 - 4 %    Basophils 0 0 - 2 %    Immature Granulocytes 0 0 %    Segs Absolute 13.79 (H) 1.50 - 8.10 k/uL    Absolute Lymph # 2.04 1.10 - 3.70 k/uL    Absolute Mono # 1.27 (H) 0.10 - 1.20 k/uL    Absolute Eos # 0.12 0.00 - 0.44 k/uL    Basophils # 0.07 0.00 - 0.20 k/uL    Absolute Immature Granulocyte 0.07 0.00 - 0.30 k/uL    WBC Morphology NOT REPORTED     RBC Morphology NOT REPORTED     Platelet Estimate NOT REPORTED    BASIC METABOLIC PANEL   Result Value Ref Range    Glucose 155 (H) 70 - 99 mg/dL    BUN 15 6 - 20 mg/dL    CREATININE 0.56 0.50 - 0.90 mg/dL    Bun/Cre Ratio NOT REPORTED 9 - 20    Calcium 8.4 (L) 8.6 - 10.4 mg/dL    Sodium 138 135 - 144 mmol/L    Potassium 3.4 (L) 3.7 - 5.3 mmol/L    Chloride 101 98 - 107 mmol/L    CO2 21 20 - 31 mmol/L    Anion Gap 16 9 - 17 mmol/L    GFR Non-African American >60 >60 mL/min    GFR African American >60 >60 mL/min    GFR Comment          GFR Staging NOT REPORTED    Troponin   Result Value Ref Range    Troponin T <0.03 <0.03 ng/mL    Troponin Interp         Troponin   Result Value Ref Range    Troponin T <0.03 <0.03 ng/mL    Troponin Interp             RADIOLOGY:    Xr Chest Standard (2 Vw)    Result Date: 12/11/2017  EXAMINATION: TWO VIEWS OF THE CHEST 12/11/2017 11:15 pm COMPARISON: 08/12/2014 HISTORY: ORDERING SYSTEM PROVIDED HISTORY: SOB, CP, Angioedema TECHNOLOGIST PROVIDED HISTORY: Reason for exam:->SOB, CP, Angioedema FINDINGS: Heart size is normal the lungs are clear. No pneumothorax or pleural effusion.   There

## 2017-12-12 NOTE — ED NOTES
Bed: 15  Expected date: 12/11/17  Expected time: 10:55 PM  Means of arrival: [facility]  Comments:  FARZAD Wiggins 78, RN  12/11/17 5150

## 2017-12-12 NOTE — ED PROVIDER NOTES
[facility] ED     Emergency Department     Faculty Attestation        I performed a history and physical examination of the patient and discussed management with the resident. I reviewed the residents note and agree with the documented findings and plan of care. Any areas of disagreement are noted on the chart. I was personally present for the key portions of any procedures. I have documented in the chart those procedures where I was not present during the key portions. I have reviewed the emergency nurses triage note. I agree with the chief complaint, past medical history, past surgical history, allergies, medications, social and family history as documented unless otherwise noted below. For mid-level providers such as nurse practitioners as well as physicians assistants:    I have personally seen and evaluated the patient. I find the patient's history and physical exam are consistent with NP/PA documentation. I agree with the care provided, treatment rendered, disposition, & follow-up plan. Additional findings are as noted. Vital Signs: /84   Pulse 97   Temp 98 °F (36.7 °C) (Oral)   Resp 13   SpO2 97%   PCP:  Ary Casanova MD    Pertinent Comments:           Critical Care  None         Note, if the patient's blood pressure was elevated, and they have no history of hypertension, they were informed of the following: The patient may have Pre-hypertension/Hypertension: The patient has been informed that they may have pre-hypertension or Hypertension based on a blood pressure reading in the emergency department. I recommend that the patient call the primary care provider listed on their discharge instructions or a physician of their choice this week to arrange follow up for further evaluation of possible pre-hypertension or Hypertension.   (Please note that portions of this note were completed with a voice recognition program.  Efforts were made to edit the dictations but occasionally words are mis-transcribed. )    Serafin Villafana MD  Attending Emergency Medicine Physician              Joaquín Maher MD  12/11/17 4130

## 2017-12-16 DIAGNOSIS — L70.0 CYSTIC ACNE: ICD-10-CM

## 2017-12-18 RX ORDER — TRETINOIN 0.25 MG/G
GEL TOPICAL
Qty: 45 G | Refills: 2 | Status: SHIPPED | OUTPATIENT
Start: 2017-12-18 | End: 2017-12-21 | Stop reason: SDUPTHER

## 2017-12-21 ENCOUNTER — OFFICE VISIT (OUTPATIENT)
Dept: FAMILY MEDICINE CLINIC | Age: 52
End: 2017-12-21
Payer: MEDICARE

## 2017-12-21 VITALS
SYSTOLIC BLOOD PRESSURE: 124 MMHG | HEIGHT: 66 IN | BODY MASS INDEX: 22.31 KG/M2 | WEIGHT: 138.8 LBS | TEMPERATURE: 98.8 F | OXYGEN SATURATION: 99 % | DIASTOLIC BLOOD PRESSURE: 77 MMHG | HEART RATE: 77 BPM

## 2017-12-21 DIAGNOSIS — T78.2XXS ANAPHYLAXIS, SEQUELA: ICD-10-CM

## 2017-12-21 DIAGNOSIS — Z00.00 ROUTINE GENERAL MEDICAL EXAMINATION AT A HEALTH CARE FACILITY: ICD-10-CM

## 2017-12-21 DIAGNOSIS — L70.0 ACNE COMEDONE: Primary | ICD-10-CM

## 2017-12-21 DIAGNOSIS — Z23 IMMUNIZATION DUE: ICD-10-CM

## 2017-12-21 DIAGNOSIS — L70.0 CYSTIC ACNE: ICD-10-CM

## 2017-12-21 DIAGNOSIS — Z76.0 MEDICATION REFILL: ICD-10-CM

## 2017-12-21 DIAGNOSIS — K59.00 CONSTIPATION, UNSPECIFIED CONSTIPATION TYPE: ICD-10-CM

## 2017-12-21 DIAGNOSIS — E78.00 HIGH CHOLESTEROL: ICD-10-CM

## 2017-12-21 PROCEDURE — 99213 OFFICE O/P EST LOW 20 MIN: CPT

## 2017-12-21 PROCEDURE — 90688 IIV4 VACCINE SPLT 0.5 ML IM: CPT

## 2017-12-21 PROCEDURE — G0438 PPPS, INITIAL VISIT: HCPCS | Performed by: HOSPITALIST

## 2017-12-21 RX ORDER — EPINEPHRINE 0.3 MG/.3ML
INJECTION SUBCUTANEOUS
Qty: 1 EACH | Refills: 2 | Status: SHIPPED | OUTPATIENT
Start: 2017-12-21 | End: 2022-07-26 | Stop reason: SDUPTHER

## 2017-12-21 RX ORDER — AMOXICILLIN 250 MG
CAPSULE ORAL
Qty: 60 TABLET | Refills: 3 | Status: SHIPPED | OUTPATIENT
Start: 2017-12-21 | End: 2018-04-19 | Stop reason: SDUPTHER

## 2017-12-21 RX ORDER — TRETINOIN 0.25 MG/G
GEL TOPICAL
Qty: 45 G | Refills: 2 | Status: CANCELLED | OUTPATIENT
Start: 2017-12-21

## 2017-12-21 RX ORDER — TRETINOIN 0.25 MG/G
GEL TOPICAL
Qty: 45 G | Refills: 2 | Status: SHIPPED | OUTPATIENT
Start: 2017-12-21 | End: 2018-01-15 | Stop reason: SDUPTHER

## 2017-12-21 RX ORDER — ATORVASTATIN CALCIUM 20 MG/1
20 TABLET, FILM COATED ORAL DAILY
Qty: 30 TABLET | Refills: 3 | Status: SHIPPED | OUTPATIENT
Start: 2017-12-21 | End: 2018-04-19 | Stop reason: SDUPTHER

## 2017-12-21 RX ORDER — DOXYCYCLINE HYCLATE 100 MG
TABLET ORAL
Qty: 30 TABLET | Refills: 3 | Status: SHIPPED | OUTPATIENT
Start: 2017-12-21 | End: 2018-01-15 | Stop reason: SDUPTHER

## 2017-12-21 RX ORDER — MIDODRINE HYDROCHLORIDE 10 MG/1
10 TABLET ORAL 3 TIMES DAILY
Qty: 90 TABLET | Refills: 3 | Status: SHIPPED | OUTPATIENT
Start: 2017-12-21 | End: 2018-05-22 | Stop reason: ALTCHOICE

## 2017-12-21 RX ORDER — POLYETHYLENE GLYCOL 3350 17 G/17G
POWDER, FOR SOLUTION ORAL
Qty: 510 G | Refills: 3 | Status: SHIPPED | OUTPATIENT
Start: 2017-12-21 | End: 2020-10-20

## 2017-12-21 RX ORDER — OMEPRAZOLE 20 MG/1
CAPSULE, DELAYED RELEASE ORAL
Qty: 60 CAPSULE | Refills: 3 | Status: SHIPPED | OUTPATIENT
Start: 2017-12-21 | End: 2018-04-19 | Stop reason: SDUPTHER

## 2017-12-21 ASSESSMENT — ANXIETY QUESTIONNAIRES: GAD7 TOTAL SCORE: 0

## 2017-12-21 ASSESSMENT — PATIENT HEALTH QUESTIONNAIRE - PHQ9: SUM OF ALL RESPONSES TO PHQ QUESTIONS 1-9: 0

## 2017-12-21 ASSESSMENT — LIFESTYLE VARIABLES: HOW OFTEN DO YOU HAVE A DRINK CONTAINING ALCOHOL: 0

## 2017-12-21 NOTE — PROGRESS NOTES
I have reviewed and discussed key elements of Mary Alice Colbert with the resident including plan of care and follow up and agree with the care asha plan.

## 2017-12-21 NOTE — PATIENT INSTRUCTIONS
Thank you for letting us take care of you today. We hope all your questions were addressed. If a question was overlooked or something else comes to mind after you return home, please contact a member of your Care Team listed below. Please make sure you have a routine office visit set up to follow-up on [address]. Your Care Team at [place] [address] is Team #2  Armin Adame DO (Faculty)  Teri Calle MD (Resident)  Luis Alfredo Vega MD (Resident)  Namrata Duque MD (Resident)  Kennedy Hughes MD (Resident)  Merline Bacon MD (Resident)  Deloris Mariano LPN  [facility]  Mary Huitron, A  Timo Wilson ([address])  Cherise Gonzalez RN, ([address] [facility])  Ginny Guerrero, Ph.D., (Behavioral Services)  Mahin Houser [facility] (Clinical Pharmacist)     Office phone number: [phone]    If you need to get in right away due to illness, please be advised we have \"Same Day\" appointments available Monday-Friday. Please call us at [phone] option #1 to schedule your \"Same Day\" appointment. Personalized Preventive Plan for Catherine Braga - 12/21/2017  Medicare offers a range of preventive health benefits. Some of the tests and screenings are paid in full while other may be subject to a deductible, co-insurance, and/or copay. Some of these benefits include a comprehensive review of your medical history including lifestyle, illnesses that may run in your family, and various assessments and screenings as appropriate. After reviewing your medical record and screening and assessments performed today your provider may have ordered immunizations, labs, imaging, and/or referrals for you. A list of these orders (if applicable) as well as your Preventive Care list are included within your After Visit Summary for your review.     Other Preventive Recommendations:    · A preventive eye exam performed by an eye specialist is recommended every 1-2 years to screen for glaucoma; cataracts, macular

## 2017-12-21 NOTE — PROGRESS NOTES
D3) 5000 units TABS Take 1 tablet by mouth daily Yes Historical Provider, MD   FLUoxetine (PROZAC) 40 MG capsule  Yes Historical Provider, MD Elo Noelale 18 MCG inhalation capsule inhale contents of 1 capsule by mouth once daily Yes Kevin Humphries MD   ferrous sulfate 325 (65 FE) MG tablet take 1 tablet by mouth once daily WITH BREAKFAST Yes Rosina Cheng MD   Umeclidinium Bromide (INCRUSE ELLIPTA) 62.5 MCG/INH AEPB Inhale 1 puff into the lungs daily Yes Kevin Humphries MD   ASMANEX  MCG/ACT AERO Inhale 2 puffs into the lungs 2 times daily Yes Kevin Humphries MD   TRINTELLIX 20 MG TABS tablet take 1 tablet by mouth once daily Yes Historical Provider, MD   tiZANidine (ZANAFLEX) 4 MG tablet take 1 tablet by mouth three times a day  Rosina Cheng MD   mometasone (ASMANEX 30 METERED DOSES) 220 MCG/INH inhaler Inhale 1 puff into the lungs daily  Kevin Humphries MD   ondansetron (ZOFRAN) 4 MG tablet take 1 tablet by mouth once daily if needed for nausea and vomiting  Rahul Shah MD   Blood Pressure KIT Take readings twice a day and when symptomatic  Rahul Shah MD       Past Medical History:   Diagnosis Date    Vargas's esophagus     for 20 yrs on and off    Bipolar disorder [facility])     recently diagnosed and placed on meds    Depression     On Meds    Eating disorder     bulemia    Empyema ([facility] [address] 2/17/2014    left    Substance abuse      Past Surgical History:   Procedure Laterality Date    HYSTERECTOMY  1988    THORACOSCOPY  2/17/14    Lt.  VATS w/complete decortication    UPPER GASTROINTESTINAL ENDOSCOPY  10/13/2014    GE junction biopsy       Family History   Problem Relation Age of Onset    Asthma Mother     Stroke Father     Heart Attack Father     Cancer Maternal Grandmother      skin cancer    Diabetes Maternal Uncle        CareTeam (Including outside providers/suppliers regularly involved in providing care):   Patient Care Team:  Rosina Cheng MD as PCP - General (Family Medicine)  Morris Dove MD as PCP - [facility] Attributed Provider  Sandy Gould MD as Surgeon (Cardiothoracic Surgery)  Shawanda Jeffries MD as Consulting Physician (Pulmonology)  [facility] Readings from Last 3 Encounters:   12/21/17 138 lb 12.8 oz (63 kg)   09/18/17 137 lb (62.1 kg)   09/07/17 136 lb 3.2 oz (61.8 kg)     Vitals:    12/21/17 0933   BP: 124/77   Site: Left Arm   Position: Sitting   Cuff Size: Small Adult   Pulse: 77   Temp: 98.8 °F (37.1 °C)   TempSrc: Temporal   SpO2: 99%   Weight: 138 lb 12.8 oz (63 kg)   Height: 5' 5.98\" (1.676 m)       Skin: warm and dry, no rash or erythema  Eyes: pupils equal, round, and reactive to light, extraocular eye movements intact, conjunctivae normal  ENT: tympanic membrane, external ear and ear canal normal bilaterally, oropharynx clear and moist with normal mucous membranes  Neck: neck supple and non tender without mass, no thyromegaly or thyroid nodules, no cervical lymphadenopathy   Pulmonary/Chest: clear to auscultation bilaterally- no wheezes, rales or rhonchi, normal air movement, no respiratory distress  Cardiovascular: normal rate, normal S1 and S2, no gallops, intact distal pulses and no carotid bruits  Abdomen: soft, non-tender, non-distended, normal bowel sounds, no masses or organomegaly    Patient's complete Health Risk Assessment and screening values have been reviewed and are found in Flowsheets. The following problems were reviewed today and where indicated follow up appointments were made and/or referrals ordered.     Positive Risk Factor Screenings with Interventions:     General Health:  General  In general, how would you say your health is?: Good  In the past 7 days, have you experienced any of the following?: None of These  Do you get the social and emotional support that you need?: Yes  Do you have a Living Will?: (!) No  General Health Risk Interventions:  · None indicated    Health Habits/Nutrition:  Health Habits/Nutrition  Do you

## 2018-01-15 ENCOUNTER — OFFICE VISIT (OUTPATIENT)
Dept: DERMATOLOGY | Age: 53
End: 2018-01-15
Payer: MEDICARE

## 2018-01-15 ENCOUNTER — TELEPHONE (OUTPATIENT)
Dept: DERMATOLOGY | Age: 53
End: 2018-01-15

## 2018-01-15 VITALS
HEIGHT: 66 IN | SYSTOLIC BLOOD PRESSURE: 148 MMHG | DIASTOLIC BLOOD PRESSURE: 70 MMHG | BODY MASS INDEX: 22.82 KG/M2 | HEART RATE: 77 BPM | OXYGEN SATURATION: 99 % | WEIGHT: 142 LBS

## 2018-01-15 DIAGNOSIS — Z79.899 HIGH RISK MEDICATION USE: Primary | ICD-10-CM

## 2018-01-15 DIAGNOSIS — L70.0 CYSTIC ACNE: ICD-10-CM

## 2018-01-15 PROCEDURE — 3014F SCREEN MAMMO DOC REV: CPT | Performed by: DERMATOLOGY

## 2018-01-15 PROCEDURE — 99213 OFFICE O/P EST LOW 20 MIN: CPT | Performed by: DERMATOLOGY

## 2018-01-15 PROCEDURE — 1036F TOBACCO NON-USER: CPT | Performed by: DERMATOLOGY

## 2018-01-15 PROCEDURE — G8420 CALC BMI NORM PARAMETERS: HCPCS | Performed by: DERMATOLOGY

## 2018-01-15 PROCEDURE — 3017F COLORECTAL CA SCREEN DOC REV: CPT | Performed by: DERMATOLOGY

## 2018-01-15 PROCEDURE — G8484 FLU IMMUNIZE NO ADMIN: HCPCS | Performed by: DERMATOLOGY

## 2018-01-15 PROCEDURE — G8427 DOCREV CUR MEDS BY ELIG CLIN: HCPCS | Performed by: DERMATOLOGY

## 2018-01-15 RX ORDER — TRETINOIN 0.25 MG/G
GEL TOPICAL
Qty: 45 G | Refills: 2 | Status: SHIPPED | OUTPATIENT
Start: 2018-01-15 | End: 2018-05-22 | Stop reason: ALTCHOICE

## 2018-01-15 RX ORDER — ERYTHROMYCIN AND BENZOYL PEROXIDE 30; 50 MG/G; MG/G
GEL TOPICAL PRN
Refills: 0 | COMMUNITY
Start: 2017-12-05 | End: 2018-05-22 | Stop reason: ALTCHOICE

## 2018-01-15 RX ORDER — DOXYCYCLINE HYCLATE 100 MG
TABLET ORAL
Qty: 30 TABLET | Refills: 1 | Status: SHIPPED | OUTPATIENT
Start: 2018-01-15 | End: 2018-03-14 | Stop reason: ALTCHOICE

## 2018-01-15 NOTE — PATIENT INSTRUCTIONS
physician. You must also have a pregnancy test monthly. While on isotretinoin, you must never share your medication with anyone else. You must also never donate your blood while on isotretinoin and for one month after. Prior to filling your prescription each month, you will need to take an online test to verify your knowledge regarding the dangers of becoming pregnant while on isotretinoin. If you are a male, you should use condoms if you are sexually active to prevent pregnancy in your partner while on isotretinoin. A small amount of the isotretinoin drug is present in the semen of men who take it, and it is important not to expose females to this isotretinoin during sexual intercourse. You must never share your medication with anyone else. You must also never donate your blood while on isotretinoin and for one month after. Other potential side effects:  Common side effects that may occur during the course of treatment include severely chapped lips, nose bleeds, eye dryness, dry skin, hair thinning, joint aches, and muscle aches. While patients are on isotretinoin, their skin may heal poorly or more slowly. Patients are also very sensitive to the sun and at risk of having severe sunburns while taking isotretinoin. Regular use of a lip balm, Vaseline, or Aquaphor to the lips is important to prevent excess chapping. Vaseline can be put in the nostrils at night to prevent nose bleeds. Facial moisturizers that are noncomedogenic (wont clog pores) can be used on the face and regular moisturizers can be used on the body. Patients can sometimes not use their contact lenses while on isotretinoin and may need to use eye lubricants or artificial tears. Patients can sometimes experience nearsightedness when driving at night. Over the counter ibuprofen is fine to take for muscle and joint pain. Avoid Tylenol or acetaminophen.  Please notify your physician if muscle or joint pain is not controlled with over the counter Please notify all physicians that treat you that you are on isotretinoin so that they are aware of this when prescribing new medications. Do not take a multivitamin or vitamin A supplement while on isotretinoin.

## 2018-01-15 NOTE — PROGRESS NOTES
breakouts    It is possible your prescription(s) from today's visit will require a prior authorization. If your insurance requires a prior authorization or is too costly to fill, please notify our office as soon as possible so we may take the appropriate action. Patient Education Regarding Isotretinion    Isotretinoin is a good medication for many teenagers struggling with severe acne. Most teenagers tolerate it well. However, isotretinoin is a medication that does have some potentially serious side effects. The most serious side effect occurs when someone who is pregnant takes isotretinoin; therefore, someone who is pregnant must never be exposed to isotretinoin. Taking isotretinoin while pregnant has a high chance of causing severe birth defects or deformities in the baby. Because of this serious effect, a national program called I-pledge was developed to closely monitor the use of Isotretinoin. Everyone started on isotretinoin, male or female, must be enrolled in the I pledge program which pledges to prevent pregnancy in those taking isotretinoin. I-pledge Counseling Reviewed: If you are a female and become pregnant on isotretinoin, there is a high likelihood that the baby will have serious birth defects. Therefore, in order to be started on isotretinoin, females must be on 2 forms of birth control. The types of birth control acceptable for isotretinoin use will be discussed with you by your physician. It is important that you remain on the 2 forms of birth control the entire time that you are on isotretinoin and for one month after stopping isotretinoin. If you have sexual intercourse without using the 2 forms of birth control or if there is any chance that you could be pregnant, it is important that you immediately stop your isotretinoin and notify your physician. You must also have a pregnancy test monthly. While on isotretinoin, you must never share your medication with anyone else.  You must also never donate your blood while on isotretinoin and for one month after. Prior to filling your prescription each month, you will need to take an online test to verify your knowledge regarding the dangers of becoming pregnant while on isotretinoin. If you are a male, you should use condoms if you are sexually active to prevent pregnancy in your partner while on isotretinoin. A small amount of the isotretinoin drug is present in the semen of men who take it, and it is important not to expose females to this isotretinoin during sexual intercourse. You must never share your medication with anyone else. You must also never donate your blood while on isotretinoin and for one month after. Other potential side effects:  Common side effects that may occur during the course of treatment include severely chapped lips, nose bleeds, eye dryness, dry skin, hair thinning, joint aches, and muscle aches. While patients are on isotretinoin, their skin may heal poorly or more slowly. Patients are also very sensitive to the sun and at risk of having severe sunburns while taking isotretinoin. Regular use of a lip balm, Vaseline, or Aquaphor to the lips is important to prevent excess chapping. Vaseline can be put in the nostrils at night to prevent nose bleeds. Facial moisturizers that are noncomedogenic (wont clog pores) can be used on the face and regular moisturizers can be used on the body. Patients can sometimes not use their contact lenses while on isotretinoin and may need to use eye lubricants or artificial tears. Patients can sometimes experience nearsightedness when driving at night. Over the counter ibuprofen is fine to take for muscle and joint pain. Avoid Tylenol or acetaminophen. Please notify your physician if muscle or joint pain is not controlled with over the counter ibuprofen. Avoid body piercing, and elective procedures that involve cutting or lasering the skin while on isotretinoin.  Avoid prolonged sun exposure and wear sunscreen and sun protective clothing to prevent sunburns especially during spring and summer months. Other rare but more serious side effects may occur on isotretinoin. These include depression or other mood disorders, increased production of spinal fluid, inflammation of the liver, inflammation of the pancreas, elevated cholesterol or triglyceride levels, and blood cell abnormalities. Although these side effects are rare and most patients do not develop them, patients on isotretinoin need to be monitored closely with monthly labs and monthly visits with your doctor. It is important to never drink alcohol while on isotretinoin as this may increase the likelihood of inflammation of the liver. It is important to be honest with us about any changes in your mood, depression, or suicidal thoughts while on isotretinion. We also need to be made aware of recurrent or severe headaches that do not respond to over the counter medications or are associated with blurry vision. The labs must be obtained after fasting, meaning no food or drink other than water for 12 hours before the test. We cannot refill your isotretinoin unless you return for your monthly appointments and have your monthly labs performed. If you have inflammatory bowel disease, taking isotretinoin can worsen your symptoms. Some people have developed inflammatory bowel disease while on isotretinoin or after stopping it. Studies have not concluded that there is a direct causative relationship between isotretinoin and inflammatory bowel disease. Other medications:   Patients should stop all other acne medications while on isotretinoin including both oral and topical medications. Your dermatologist will review your other medications to make sure these are safe to continue while on isotretinoin. Please notify all physicians that treat you that you are on isotretinoin so that they are aware of this when prescribing new medications.  Do not

## 2018-01-17 ENCOUNTER — TELEPHONE (OUTPATIENT)
Dept: DERMATOLOGY | Age: 53
End: 2018-01-17

## 2018-01-18 ENCOUNTER — TELEPHONE (OUTPATIENT)
Dept: DERMATOLOGY | Age: 53
End: 2018-01-18

## 2018-01-18 RX ORDER — ISOTRETINOIN 30 MG/1
30 CAPSULE ORAL DAILY
Qty: 30 CAPSULE | Refills: 0 | Status: SHIPPED | OUTPATIENT
Start: 2018-01-18 | End: 2018-02-17

## 2018-01-23 ENCOUNTER — OFFICE VISIT (OUTPATIENT)
Dept: PULMONOLOGY | Age: 53
End: 2018-01-23
Payer: MEDICARE

## 2018-01-23 VITALS — OXYGEN SATURATION: 99 % | BODY MASS INDEX: 21.05 KG/M2 | HEIGHT: 66 IN | HEART RATE: 77 BPM | WEIGHT: 131 LBS

## 2018-01-23 VITALS
RESPIRATION RATE: 16 BRPM | DIASTOLIC BLOOD PRESSURE: 66 MMHG | HEART RATE: 77 BPM | OXYGEN SATURATION: 99 % | TEMPERATURE: 99.5 F | WEIGHT: 142 LBS | BODY MASS INDEX: 22.82 KG/M2 | HEIGHT: 66 IN | SYSTOLIC BLOOD PRESSURE: 106 MMHG

## 2018-01-23 DIAGNOSIS — J44.9 CHRONIC OBSTRUCTIVE PULMONARY DISEASE, UNSPECIFIED COPD TYPE (HCC): ICD-10-CM

## 2018-01-23 DIAGNOSIS — J44.9 CHRONIC OBSTRUCTIVE PULMONARY DISEASE, UNSPECIFIED COPD TYPE (HCC): Primary | ICD-10-CM

## 2018-01-23 DIAGNOSIS — J30.9 ALLERGIC RHINITIS, UNSPECIFIED CHRONICITY, UNSPECIFIED SEASONALITY, UNSPECIFIED TRIGGER: ICD-10-CM

## 2018-01-23 DIAGNOSIS — J45.30 ASTHMATIC BRONCHITIS, MILD PERSISTENT, UNCOMPLICATED: ICD-10-CM

## 2018-01-23 DIAGNOSIS — J45.40 MODERATE PERSISTENT ASTHMATIC BRONCHITIS WITHOUT COMPLICATION: Primary | ICD-10-CM

## 2018-01-23 PROCEDURE — 3017F COLORECTAL CA SCREEN DOC REV: CPT | Performed by: INTERNAL MEDICINE

## 2018-01-23 PROCEDURE — 94060 EVALUATION OF WHEEZING: CPT | Performed by: INTERNAL MEDICINE

## 2018-01-23 PROCEDURE — 3014F SCREEN MAMMO DOC REV: CPT | Performed by: INTERNAL MEDICINE

## 2018-01-23 PROCEDURE — 94726 PLETHYSMOGRAPHY LUNG VOLUMES: CPT | Performed by: INTERNAL MEDICINE

## 2018-01-23 PROCEDURE — 3023F SPIROM DOC REV: CPT | Performed by: INTERNAL MEDICINE

## 2018-01-23 PROCEDURE — 99214 OFFICE O/P EST MOD 30 MIN: CPT | Performed by: INTERNAL MEDICINE

## 2018-01-23 PROCEDURE — G8427 DOCREV CUR MEDS BY ELIG CLIN: HCPCS | Performed by: INTERNAL MEDICINE

## 2018-01-23 PROCEDURE — 94729 DIFFUSING CAPACITY: CPT | Performed by: INTERNAL MEDICINE

## 2018-01-23 PROCEDURE — G8926 SPIRO NO PERF OR DOC: HCPCS | Performed by: INTERNAL MEDICINE

## 2018-01-23 PROCEDURE — G8484 FLU IMMUNIZE NO ADMIN: HCPCS | Performed by: INTERNAL MEDICINE

## 2018-01-23 PROCEDURE — G8420 CALC BMI NORM PARAMETERS: HCPCS | Performed by: INTERNAL MEDICINE

## 2018-01-23 PROCEDURE — 1036F TOBACCO NON-USER: CPT | Performed by: INTERNAL MEDICINE

## 2018-01-23 RX ORDER — BUDESONIDE AND FORMOTEROL FUMARATE DIHYDRATE 80; 4.5 UG/1; UG/1
2 AEROSOL RESPIRATORY (INHALATION) 2 TIMES DAILY
Qty: 1 INHALER | Refills: 5 | Status: SHIPPED | OUTPATIENT
Start: 2018-01-23 | End: 2019-04-15 | Stop reason: ALTCHOICE

## 2018-01-23 NOTE — PROGRESS NOTES
1 tablet by mouth three times a day (Patient taking differently: take 1 tablet by mouth three times a day as needed) 30 tablet 2    albuterol sulfate HFA (VENTOLIN HFA) 108 (90 Base) MCG/ACT inhaler Inhale 2 puffs into the lungs every 6 hours as needed for Wheezing 1 Inhaler 3    Cholecalciferol (VITAMIN D3) 5000 units TABS Take 1 tablet by mouth daily      FLUoxetine (PROZAC) 40 MG capsule   0    SPIRIVA HANDIHALER 18 MCG inhalation capsule inhale contents of 1 capsule by mouth once daily 30 capsule 6    ferrous sulfate 325 (65 FE) MG tablet take 1 tablet by mouth once daily WITH BREAKFAST 30 tablet 3    Umeclidinium Bromide (INCRUSE ELLIPTA) 62.5 MCG/INH AEPB Inhale 1 puff into the lungs daily 1 each 11    ASMANEX  MCG/ACT AERO Inhale 2 puffs into the lungs 2 times daily 1 Inhaler 5    TRINTELLIX 20 MG TABS tablet take 1 tablet by mouth once daily  0    ondansetron (ZOFRAN) 4 MG tablet take 1 tablet by mouth once daily if needed for nausea and vomiting 30 tablet 3     No facility-administered encounter medications on file as of 1/23/2018. Objective:    Physical Exam:  Vitals:   /66 (Site: Left Arm, Position: Sitting)   Pulse 77   Temp 99.5 °F (37.5 °C)   Resp 16   Ht 5' 6\" (1.676 m)   Wt 142 lb (64.4 kg)   SpO2 99% Comment: Room air  BMI 22.92 kg/m²   Last 3 weights: Wt Readings from Last 3 Encounters:   01/23/18 142 lb (64.4 kg)   01/23/18 131 lb (59.4 kg)   01/15/18 142 lb (64.4 kg)     Body mass index is 22.92 kg/m².     Physical Examination:   General appearance - alert, well appearing, and in no distress  Mental status - alert, oriented to person, place, and time  Eyes - pupils equal and reactive, extraocular eye movements intact  Ears - right ear normal, left ear normal  Nose - normal and patent, no erythema, discharge or polyps  Mouth - mucous membranes moist, pharynx normal without lesions  Neck - supple, no significant adenopathy  Chest - clear to auscultation, no pt's Luiza Cain MD             1/23/2018, 10:10 AM

## 2018-01-29 ENCOUNTER — TELEPHONE (OUTPATIENT)
Dept: DERMATOLOGY | Age: 53
End: 2018-01-29

## 2018-02-05 ENCOUNTER — HOSPITAL ENCOUNTER (OUTPATIENT)
Age: 53
Setting detail: SPECIMEN
Discharge: HOME | End: 2018-02-05
Payer: MEDICARE

## 2018-02-05 DIAGNOSIS — Z79.899 HIGH RISK MEDICATION USE: ICD-10-CM

## 2018-02-05 LAB
ALT SERPL-CCNC: 15 U/L (ref 5–33)
AST SERPL-CCNC: 18 U/L

## 2018-03-14 ENCOUNTER — OFFICE VISIT (OUTPATIENT)
Dept: DERMATOLOGY | Age: 53
End: 2018-03-14
Payer: MEDICARE

## 2018-03-14 VITALS
HEIGHT: 66 IN | HEART RATE: 71 BPM | SYSTOLIC BLOOD PRESSURE: 133 MMHG | WEIGHT: 135 LBS | BODY MASS INDEX: 21.69 KG/M2 | DIASTOLIC BLOOD PRESSURE: 91 MMHG

## 2018-03-14 DIAGNOSIS — L70.0 ACNE VULGARIS: Primary | ICD-10-CM

## 2018-03-14 PROCEDURE — 99213 OFFICE O/P EST LOW 20 MIN: CPT | Performed by: DERMATOLOGY

## 2018-03-14 PROCEDURE — 3014F SCREEN MAMMO DOC REV: CPT | Performed by: DERMATOLOGY

## 2018-03-14 PROCEDURE — G8427 DOCREV CUR MEDS BY ELIG CLIN: HCPCS | Performed by: DERMATOLOGY

## 2018-03-14 PROCEDURE — 1036F TOBACCO NON-USER: CPT | Performed by: DERMATOLOGY

## 2018-03-14 PROCEDURE — G8482 FLU IMMUNIZE ORDER/ADMIN: HCPCS | Performed by: DERMATOLOGY

## 2018-03-14 PROCEDURE — G8420 CALC BMI NORM PARAMETERS: HCPCS | Performed by: DERMATOLOGY

## 2018-03-14 PROCEDURE — 3017F COLORECTAL CA SCREEN DOC REV: CPT | Performed by: DERMATOLOGY

## 2018-03-14 RX ORDER — TAZAROTENE 1 MG/G
CREAM TOPICAL
Qty: 30 G | Refills: 2 | Status: SHIPPED | OUTPATIENT
Start: 2018-03-14 | End: 2018-05-22 | Stop reason: SDUPTHER

## 2018-03-14 RX ORDER — SPIRONOLACTONE 50 MG/1
50 TABLET, FILM COATED ORAL DAILY
Qty: 30 TABLET | Refills: 2 | Status: SHIPPED | OUTPATIENT
Start: 2018-03-14 | End: 2018-05-22 | Stop reason: SDUPTHER

## 2018-03-14 NOTE — PATIENT INSTRUCTIONS
1. Stop doxycycline  2. Start spironolactone 25 mg (1/2 pill) at bedtime for 2 weeks if tolerated increase to 50 mg (whole pill) at bedtime  3. Continue tretinoin 0.025% gel nightly - if/when Tazorac approved switch to Tazorac  4. Continue Benzamycin as spot treatment  5.  Follow up in 3 months

## 2018-04-03 ENCOUNTER — OFFICE VISIT (OUTPATIENT)
Dept: PULMONOLOGY | Age: 53
End: 2018-04-03
Payer: MEDICARE

## 2018-04-03 VITALS
SYSTOLIC BLOOD PRESSURE: 112 MMHG | HEIGHT: 66 IN | DIASTOLIC BLOOD PRESSURE: 80 MMHG | TEMPERATURE: 98.8 F | OXYGEN SATURATION: 97 % | HEART RATE: 86 BPM | BODY MASS INDEX: 22.18 KG/M2 | RESPIRATION RATE: 14 BRPM | WEIGHT: 138 LBS

## 2018-04-03 DIAGNOSIS — R06.09 DYSPNEA ON EXERTION: ICD-10-CM

## 2018-04-03 DIAGNOSIS — J45.40 MODERATE PERSISTENT ASTHMA WITHOUT COMPLICATION: ICD-10-CM

## 2018-04-03 DIAGNOSIS — J44.9 CHRONIC OBSTRUCTIVE PULMONARY DISEASE, UNSPECIFIED COPD TYPE (HCC): Primary | ICD-10-CM

## 2018-04-03 PROCEDURE — 1036F TOBACCO NON-USER: CPT | Performed by: INTERNAL MEDICINE

## 2018-04-03 PROCEDURE — G8427 DOCREV CUR MEDS BY ELIG CLIN: HCPCS | Performed by: INTERNAL MEDICINE

## 2018-04-03 PROCEDURE — 3014F SCREEN MAMMO DOC REV: CPT | Performed by: INTERNAL MEDICINE

## 2018-04-03 PROCEDURE — 3023F SPIROM DOC REV: CPT | Performed by: INTERNAL MEDICINE

## 2018-04-03 PROCEDURE — 99214 OFFICE O/P EST MOD 30 MIN: CPT | Performed by: INTERNAL MEDICINE

## 2018-04-03 PROCEDURE — 3017F COLORECTAL CA SCREEN DOC REV: CPT | Performed by: INTERNAL MEDICINE

## 2018-04-03 PROCEDURE — G8926 SPIRO NO PERF OR DOC: HCPCS | Performed by: INTERNAL MEDICINE

## 2018-04-03 PROCEDURE — G8420 CALC BMI NORM PARAMETERS: HCPCS | Performed by: INTERNAL MEDICINE

## 2018-04-19 ENCOUNTER — OFFICE VISIT (OUTPATIENT)
Dept: FAMILY MEDICINE CLINIC | Age: 53
End: 2018-04-19
Payer: MEDICARE

## 2018-04-19 VITALS
DIASTOLIC BLOOD PRESSURE: 74 MMHG | TEMPERATURE: 98.6 F | HEART RATE: 78 BPM | WEIGHT: 137.8 LBS | HEIGHT: 66 IN | SYSTOLIC BLOOD PRESSURE: 103 MMHG | BODY MASS INDEX: 22.14 KG/M2

## 2018-04-19 DIAGNOSIS — L70.0 ACNE COMEDONE: Primary | ICD-10-CM

## 2018-04-19 DIAGNOSIS — E78.00 HIGH CHOLESTEROL: ICD-10-CM

## 2018-04-19 DIAGNOSIS — Z00.00 HEALTHCARE MAINTENANCE: ICD-10-CM

## 2018-04-19 DIAGNOSIS — K59.00 CONSTIPATION, UNSPECIFIED CONSTIPATION TYPE: ICD-10-CM

## 2018-04-19 DIAGNOSIS — Z76.0 MEDICATION REFILL: ICD-10-CM

## 2018-04-19 LAB — HBA1C MFR BLD: 5.5 %

## 2018-04-19 PROCEDURE — 99213 OFFICE O/P EST LOW 20 MIN: CPT

## 2018-04-19 PROCEDURE — 3014F SCREEN MAMMO DOC REV: CPT | Performed by: HOSPITALIST

## 2018-04-19 PROCEDURE — 99213 OFFICE O/P EST LOW 20 MIN: CPT | Performed by: HOSPITALIST

## 2018-04-19 PROCEDURE — 3017F COLORECTAL CA SCREEN DOC REV: CPT | Performed by: HOSPITALIST

## 2018-04-19 PROCEDURE — G8420 CALC BMI NORM PARAMETERS: HCPCS | Performed by: HOSPITALIST

## 2018-04-19 PROCEDURE — 1036F TOBACCO NON-USER: CPT | Performed by: HOSPITALIST

## 2018-04-19 PROCEDURE — G8427 DOCREV CUR MEDS BY ELIG CLIN: HCPCS | Performed by: HOSPITALIST

## 2018-04-19 PROCEDURE — 83036 HEMOGLOBIN GLYCOSYLATED A1C: CPT | Performed by: HOSPITALIST

## 2018-04-19 RX ORDER — OMEPRAZOLE 20 MG/1
CAPSULE, DELAYED RELEASE ORAL
Qty: 60 CAPSULE | Refills: 3 | Status: SHIPPED | OUTPATIENT
Start: 2018-04-19 | End: 2018-06-19 | Stop reason: SDUPTHER

## 2018-04-19 RX ORDER — ATORVASTATIN CALCIUM 20 MG/1
20 TABLET, FILM COATED ORAL DAILY
Qty: 30 TABLET | Refills: 3 | Status: SHIPPED | OUTPATIENT
Start: 2018-04-19 | End: 2018-06-19 | Stop reason: SDUPTHER

## 2018-04-19 RX ORDER — AMOXICILLIN 250 MG
CAPSULE ORAL
Qty: 60 TABLET | Refills: 3 | Status: SHIPPED | OUTPATIENT
Start: 2018-04-19 | End: 2018-08-28 | Stop reason: SDUPTHER

## 2018-04-19 RX ORDER — DOXYCYCLINE HYCLATE 100 MG
TABLET ORAL
Qty: 30 TABLET | Refills: 0 | Status: SHIPPED | OUTPATIENT
Start: 2018-04-19 | End: 2018-05-22 | Stop reason: ALTCHOICE

## 2018-04-19 ASSESSMENT — ENCOUNTER SYMPTOMS
CHEST TIGHTNESS: 0
WHEEZING: 0
ABDOMINAL DISTENTION: 0
SHORTNESS OF BREATH: 0

## 2018-05-22 ENCOUNTER — OFFICE VISIT (OUTPATIENT)
Dept: DERMATOLOGY | Age: 53
End: 2018-05-22
Payer: MEDICARE

## 2018-05-22 VITALS
DIASTOLIC BLOOD PRESSURE: 82 MMHG | HEIGHT: 66 IN | OXYGEN SATURATION: 99 % | BODY MASS INDEX: 22.5 KG/M2 | SYSTOLIC BLOOD PRESSURE: 105 MMHG | HEART RATE: 75 BPM | WEIGHT: 140 LBS

## 2018-05-22 DIAGNOSIS — Z79.899 HIGH RISK MEDICATION USE: ICD-10-CM

## 2018-05-22 DIAGNOSIS — L70.0 ACNE VULGARIS: Primary | ICD-10-CM

## 2018-05-22 PROCEDURE — G8420 CALC BMI NORM PARAMETERS: HCPCS | Performed by: DERMATOLOGY

## 2018-05-22 PROCEDURE — G8428 CUR MEDS NOT DOCUMENT: HCPCS | Performed by: DERMATOLOGY

## 2018-05-22 PROCEDURE — 99213 OFFICE O/P EST LOW 20 MIN: CPT | Performed by: DERMATOLOGY

## 2018-05-22 PROCEDURE — 3017F COLORECTAL CA SCREEN DOC REV: CPT | Performed by: DERMATOLOGY

## 2018-05-22 PROCEDURE — 1036F TOBACCO NON-USER: CPT | Performed by: DERMATOLOGY

## 2018-05-22 RX ORDER — SPIRONOLACTONE 50 MG/1
50 TABLET, FILM COATED ORAL 2 TIMES DAILY
Qty: 60 TABLET | Refills: 5 | Status: SHIPPED | OUTPATIENT
Start: 2018-05-22 | End: 2018-11-12 | Stop reason: ALTCHOICE

## 2018-05-22 RX ORDER — TAZAROTENE 1 MG/G
CREAM TOPICAL
Qty: 30 G | Refills: 5 | Status: SHIPPED | OUTPATIENT
Start: 2018-05-22 | End: 2018-10-12 | Stop reason: SDUPTHER

## 2018-05-25 PROBLEM — K59.00 CONSTIPATION: Status: RESOLVED | Noted: 2018-04-19 | Resolved: 2018-05-25

## 2018-06-19 ENCOUNTER — OFFICE VISIT (OUTPATIENT)
Dept: FAMILY MEDICINE CLINIC | Age: 53
End: 2018-06-19
Payer: MEDICARE

## 2018-06-19 ENCOUNTER — HOSPITAL ENCOUNTER (OUTPATIENT)
Age: 53
Setting detail: SPECIMEN
Discharge: HOME | End: 2018-06-19
Payer: MEDICARE

## 2018-06-19 DIAGNOSIS — Z76.0 MEDICATION REFILL: ICD-10-CM

## 2018-06-19 DIAGNOSIS — E78.00 HIGH CHOLESTEROL: ICD-10-CM

## 2018-06-19 DIAGNOSIS — Z79.899 HIGH RISK MEDICATION USE: ICD-10-CM

## 2018-06-19 DIAGNOSIS — H93.8X1 EAR FULLNESS, RIGHT: Primary | ICD-10-CM

## 2018-06-19 LAB — POTASSIUM SERPL-SCNC: 4.1 MMOL/L (ref 3.7–5.3)

## 2018-06-19 PROCEDURE — G8420 CALC BMI NORM PARAMETERS: HCPCS | Performed by: FAMILY MEDICINE

## 2018-06-19 PROCEDURE — 1036F TOBACCO NON-USER: CPT | Performed by: FAMILY MEDICINE

## 2018-06-19 PROCEDURE — 3017F COLORECTAL CA SCREEN DOC REV: CPT | Performed by: FAMILY MEDICINE

## 2018-06-19 PROCEDURE — G8427 DOCREV CUR MEDS BY ELIG CLIN: HCPCS | Performed by: FAMILY MEDICINE

## 2018-06-19 PROCEDURE — 99213 OFFICE O/P EST LOW 20 MIN: CPT | Performed by: FAMILY MEDICINE

## 2018-06-19 PROCEDURE — 99214 OFFICE O/P EST MOD 30 MIN: CPT | Performed by: FAMILY MEDICINE

## 2018-06-19 RX ORDER — OMEPRAZOLE 20 MG/1
CAPSULE, DELAYED RELEASE ORAL
Qty: 60 CAPSULE | Refills: 3 | Status: SHIPPED | OUTPATIENT
Start: 2018-06-19 | End: 2018-11-20 | Stop reason: SDUPTHER

## 2018-06-19 RX ORDER — ATORVASTATIN CALCIUM 20 MG/1
20 TABLET, FILM COATED ORAL DAILY
Qty: 30 TABLET | Refills: 3 | Status: SHIPPED | OUTPATIENT
Start: 2018-06-19 | End: 2018-11-20 | Stop reason: SDUPTHER

## 2018-06-19 ASSESSMENT — ENCOUNTER SYMPTOMS
GASTROINTESTINAL NEGATIVE: 1
SHORTNESS OF BREATH: 0
WHEEZING: 0
COUGH: 0

## 2018-06-20 ENCOUNTER — TELEPHONE (OUTPATIENT)
Dept: DERMATOLOGY | Age: 53
End: 2018-06-20

## 2018-08-28 DIAGNOSIS — Z76.0 MEDICATION REFILL: ICD-10-CM

## 2018-08-28 DIAGNOSIS — K59.00 CONSTIPATION, UNSPECIFIED CONSTIPATION TYPE: ICD-10-CM

## 2018-08-28 RX ORDER — ETHYL ALCOHOL 62 %
GEL (ML) TOPICAL
Qty: 60 TABLET | Refills: 3 | Status: SHIPPED | OUTPATIENT
Start: 2018-08-28 | End: 2018-12-14 | Stop reason: SDUPTHER

## 2018-08-28 NOTE — TELEPHONE ENCOUNTER
Medication is on med list please review and address    Please address the medication refill and close the encounter. If I can be of assistance, please route to the applicable pool. Thank you.   Last visit:n/a  Last Med refill:n/a    Next Visit Date:  Future Appointments  Date Time Provider Zane Olive   8/29/2018 12:45 PM Tenzin Aguero DPM [facility] Pod[facility]ry [facility]   10/23/2018 1:50 PM SCHEDULE, [facility] ENT [facility] ENT [address]   10/30/2018 10:00 AM Yana Nevarez MD Resp Spec [address]   11/27/2018 10:00 AM Kobe White MD  derm [facility]   12/17/2018 8:30 AM Jeovany Mcelroy MD [address] [place] [address] Maintenance   Topic Date Due    HIV screen  07/17/1979    Shingles Vaccine (1 of 2 - 2 Dose Series) 07/17/2014    Flu vaccine (1) 09/01/2018    Creatinine monitoring  12/11/2018    Potassium monitoring  06/19/2019    Breast cancer screen  07/25/2019    Lipid screen  05/02/2022    DTaP/Tdap/Td vaccine (2 - Td) 04/30/2024    Colon cancer screen colonoscopy  07/14/2026    Pneumococcal med risk  Completed    Hepatitis C screen  Completed       Hemoglobin A1C (%)   Date Value   04/19/2018 5.5   02/12/2014 5.7             ( goal A1C is < 7)   No results found for: LABMICR  LDL Cholesterol (mg/dL)   Date Value   05/02/2017 152 (H)   12/13/2011 141 (H)       (goal LDL is <100)   AST (U/L)   Date Value   02/05/2018 18     ALT (U/L)   Date Value   02/05/2018 15     BUN (mg/dL)   Date Value   12/11/2017 15     BP Readings from Last 3 Encounters:   05/22/18 105/82   04/19/18 103/74   04/03/18 112/80          (goal 120/80)    All Future Testing planned in [facility]  Lab Frequency Next Occurrence               Patient Active Problem List:     Vargas esophagus     Acne     Dental caries     Smoking     Hypoxia     Empyema (HCC)     GERD (gastroesophageal reflux disease)     H/O chronic respiratory failure     COPD (chronic obstructive pulmonary disease) (HCC)     Orthostatic hypotension     Breast cancer

## 2018-08-29 ENCOUNTER — OFFICE VISIT (OUTPATIENT)
Dept: PODIATRY | Age: 53
End: 2018-08-29
Payer: MEDICARE

## 2018-08-29 ENCOUNTER — TELEPHONE (OUTPATIENT)
Dept: PODIATRY | Age: 53
End: 2018-08-29

## 2018-08-29 VITALS
SYSTOLIC BLOOD PRESSURE: 100 MMHG | BODY MASS INDEX: 22.66 KG/M2 | WEIGHT: 141 LBS | HEART RATE: 77 BPM | HEIGHT: 66 IN | DIASTOLIC BLOOD PRESSURE: 76 MMHG

## 2018-08-29 DIAGNOSIS — M21.42 PES PLANUS OF BOTH FEET: Primary | ICD-10-CM

## 2018-08-29 DIAGNOSIS — M21.619 BUNION: ICD-10-CM

## 2018-08-29 DIAGNOSIS — M21.41 PES PLANUS OF BOTH FEET: Primary | ICD-10-CM

## 2018-08-29 PROCEDURE — 3017F COLORECTAL CA SCREEN DOC REV: CPT | Performed by: STUDENT IN AN ORGANIZED HEALTH CARE EDUCATION/TRAINING PROGRAM

## 2018-08-29 PROCEDURE — G8427 DOCREV CUR MEDS BY ELIG CLIN: HCPCS | Performed by: STUDENT IN AN ORGANIZED HEALTH CARE EDUCATION/TRAINING PROGRAM

## 2018-08-29 PROCEDURE — 99213 OFFICE O/P EST LOW 20 MIN: CPT | Performed by: STUDENT IN AN ORGANIZED HEALTH CARE EDUCATION/TRAINING PROGRAM

## 2018-08-29 PROCEDURE — 1036F TOBACCO NON-USER: CPT | Performed by: STUDENT IN AN ORGANIZED HEALTH CARE EDUCATION/TRAINING PROGRAM

## 2018-08-29 PROCEDURE — G8420 CALC BMI NORM PARAMETERS: HCPCS | Performed by: STUDENT IN AN ORGANIZED HEALTH CARE EDUCATION/TRAINING PROGRAM

## 2018-08-29 NOTE — PROGRESS NOTES
[facility]  [address] [place] [address] [address] [place] [address], [address]  Tel: [phone]   Fax: [phone]    Subjective     CC: Bilateral pes planus    HPI:  Jaimee Metz is a 46y.o. year old female who presents to clinic today for bilateral pes planus. Patient reports she received custom functional orthotics about 2 years prior from this office and reports she has had 100% pain relief. She is requesting new custom inserts today. She does admit that she has had prior inserts from [facility] which helped in the past as well. Denies being diabetic. Primary care physician is Dc Alas MD.    ROS:    Constitutional: Denies nausea, vomiting, fever, chills. Neurologic: Denies numbness, tingling, and burning in the feet. Vascular: Denies symptoms of lower extremity claudication. Skin: Denies open wounds. Otherwise negative except as noted in the HPI. PMH:  Past Medical History:   Diagnosis Date    Vargas's esophagus     for 20 yrs on and off    Bipolar disorder ([facility] [address]     recently diagnosed and placed on meds    Depression     On Meds    Eating disorder     bulemia    Empyema ([facility] [address] 2/17/2014    left    Substance abuse        Surgical History:   Past Surgical History:   Procedure Laterality Date   [address]    THORACOSCOPY  2/17/14    Lt. VATS w/complete decortication    UPPER GASTROINTESTINAL ENDOSCOPY  10/13/2014    GE junction biopsy       Social History:  Social History   Substance Use Topics    Smoking status: Former Smoker     Packs/day: 1.00     Years: 34.00     Types: Cigarettes     Start date: 6/30/1977     Quit date: 2/28/2014    Smokeless tobacco: Never Used    Alcohol use Yes      Comment: quit 38 days ago after 30 plus years of heavy abuse       Medications:  Prior to Admission medications    Medication Sig Start Date End Date Taking?  Authorizing Provider   STEPHANIE ESPINOZA COL-RITE 8.6-50 MG per tablet take 2 tablets by mouth once daily if needed for constipation Physical Exam:  General:  Alert and oriented x3. In no acute distress. Lower Extremity Physical Exam:    Vascular: DP and PT pulses are 2/4 palpable, Bilateral. CFT <3 seconds to all digits, Bilateral.  No edema, Bilateral.  Hair growth is present to the level of the digits, Bilateral.     Neuro: Saph/sural/SP/DP/plantar sensation intact to light touch. Protective sensation is intact to 10/10 sites as tested with a 5.07g SWMF, Bilateral.     Musculoskeletal: EHL/FHL/GS/TA gross motor intact. Pain present upon palpation of the HAV, Right. decreased medial longitudinal arch, Bilateral.  Ankle ROM decreased,Bilateral.  1st MPJ ROM within normal limits, Bilateral.  Dorsally contracted digits absent, Bilateral.     Dermatologic: Warm, dry, supple, Bilateral.  No open lesions, macerations, or signs of infection noted. Nails 1-5, matt are elongated, thickened, and discolored. Biomechanical Exam:    Right foot: 1st MPJ:   50 unloaded, 40  loaded  Right foot: 1st Ray:         6mm DF, 4mm PF  Right foot: Ankle DF knee extended:   -2  Right foot: Ankle DF knee flexed:  2     Left foot: 1st MPJ: 55 unloaded, 45 loaded  Left foot: 1st Ray: 6mm DF, 4mm PF  Left foot: Ankle DF knee extended: -2  Left foot: Ankle DF knee flexed: 2     Gait analysis: non-antagic gait, equinus, no abductory twist     Assessment   Jaimee Metz is a 46 y.o. female with     Diagnosis Orders   1. Pes planus of both feet     2. Bunion          Plan   · Patient examined and evaluated  · Diagnosis and treatment options discussed in detail  · Will check for pre-authorization for custom inserts. Pt will received phone call after preauthorization returns to see if her insurance will cover custom inserts. · Educated pt on Powerstep pinnacle inserts if insurance will not cover custom inserts. · Will bring pt to clinic for casting of inserts if preauth approved.   · Please, call the office with any questions or concerns     No orders of the

## 2018-09-05 ENCOUNTER — OFFICE VISIT (OUTPATIENT)
Dept: PODIATRY | Age: 53
End: 2018-09-05
Payer: MEDICARE

## 2018-09-05 VITALS
DIASTOLIC BLOOD PRESSURE: 78 MMHG | SYSTOLIC BLOOD PRESSURE: 110 MMHG | HEART RATE: 89 BPM | WEIGHT: 141.4 LBS | HEIGHT: 66 IN | BODY MASS INDEX: 22.73 KG/M2

## 2018-09-05 DIAGNOSIS — M79.672 PAIN IN BOTH FEET: ICD-10-CM

## 2018-09-05 DIAGNOSIS — M79.671 PAIN IN BOTH FEET: ICD-10-CM

## 2018-09-05 DIAGNOSIS — M21.41 PES PLANUS OF BOTH FEET: Primary | ICD-10-CM

## 2018-09-05 DIAGNOSIS — M21.42 PES PLANUS OF BOTH FEET: Primary | ICD-10-CM

## 2018-09-05 PROCEDURE — L3020 FOOT LONGITUD/METATARSAL SUP: HCPCS | Performed by: STUDENT IN AN ORGANIZED HEALTH CARE EDUCATION/TRAINING PROGRAM

## 2018-09-05 PROCEDURE — 99213 OFFICE O/P EST LOW 20 MIN: CPT | Performed by: STUDENT IN AN ORGANIZED HEALTH CARE EDUCATION/TRAINING PROGRAM

## 2018-09-05 NOTE — PROGRESS NOTES
Patient instructed to remove shoes and socks, instructed to sit in exam chair. Current PCP name is Dr. Arina Grigsby and date of last visit 6/19/18. Do you have a follow up visit scheduled?   Yes     If yes the date is 12/17/18

## 2018-09-06 ENCOUNTER — TELEPHONE (OUTPATIENT)
Dept: PODIATRY | Age: 53
End: 2018-09-06

## 2018-10-10 ENCOUNTER — OFFICE VISIT (OUTPATIENT)
Dept: PODIATRY | Age: 53
End: 2018-10-10
Payer: MEDICARE

## 2018-10-10 VITALS
DIASTOLIC BLOOD PRESSURE: 69 MMHG | SYSTOLIC BLOOD PRESSURE: 93 MMHG | WEIGHT: 140.8 LBS | HEIGHT: 66 IN | BODY MASS INDEX: 22.63 KG/M2 | HEART RATE: 74 BPM

## 2018-10-10 DIAGNOSIS — M21.41 PES PLANUS OF BOTH FEET: Primary | ICD-10-CM

## 2018-10-10 DIAGNOSIS — M21.42 PES PLANUS OF BOTH FEET: Primary | ICD-10-CM

## 2018-10-10 PROCEDURE — G8420 CALC BMI NORM PARAMETERS: HCPCS | Performed by: PODIATRIST

## 2018-10-10 PROCEDURE — G8427 DOCREV CUR MEDS BY ELIG CLIN: HCPCS | Performed by: PODIATRIST

## 2018-10-10 PROCEDURE — G8484 FLU IMMUNIZE NO ADMIN: HCPCS | Performed by: PODIATRIST

## 2018-10-10 PROCEDURE — 1036F TOBACCO NON-USER: CPT | Performed by: PODIATRIST

## 2018-10-10 PROCEDURE — 3017F COLORECTAL CA SCREEN DOC REV: CPT | Performed by: PODIATRIST

## 2018-10-10 PROCEDURE — 99212 OFFICE O/P EST SF 10 MIN: CPT | Performed by: PODIATRIST

## 2018-10-10 NOTE — PROGRESS NOTES
[facility]  [address] [facility] [place],  [address] [address]  Tel: [phone]     Subjective     CC: Bilateral pes planus    HPI:  Christal Leung is a 46y.o. year old female who presents to clinic today to pickup her orthotics. She was seen by the medical student and eloped prior to being seen by the physician. Primary care physician is Berenice Greer MD.    ROS:     PMH:  Past Medical History:   Diagnosis Date    Vargas's esophagus     for 20 yrs on and off    Bipolar disorder ([facility] [address]     recently diagnosed and placed on meds    Depression     On Meds    Eating disorder     bulemia    Empyema ([facility] [address] 2/17/2014    left    Substance abuse [facility])        Surgical History:   Past Surgical History:   Procedure Laterality Date   [address]    THORACOSCOPY  2/17/14    Lt. VATS w/complete decortication    UPPER GASTROINTESTINAL ENDOSCOPY  10/13/2014    GE junction biopsy       Social History:  Social History   Substance Use Topics    Smoking status: Former Smoker     Packs/day: 1.00     Years: 34.00     Types: Cigarettes     Start date: 6/30/1977     Quit date: 2/28/2014    Smokeless tobacco: Never Used    Alcohol use Yes      Comment: quit 38 days ago after 30 plus years of heavy abuse       Medications:  Prior to Admission medications    Medication Sig Start Date End Date Taking? Authorizing Provider   RA P COL-RITE 8.6-50 MG per tablet take 2 tablets by mouth once daily if needed for constipation 8/28/18  Yes Vini Winslow MD   atorvastatin (LIPITOR) 20 MG tablet Take 1 tablet by mouth daily 6/19/18  Yes Jah Celis MD   omeprazole (PRILOSEC) 20 MG delayed release capsule take 2 capsules by mouth once daily 6/19/18  Yes Jah Celis MD   spironolactone (ALDACTONE) 50 MG tablet Take 1 tablet by mouth 2 times daily 5/22/18  Yes Lilly Venegas MD   tazarotene (AVAGE) 0.1 % cream Apply topically nightly.  5/22/18  Yes Lilly Venegas MD   beclomethasone (QVAR) 40 MCG/ACT inhaler Inhale 2 puffs into the lungs 2 times daily 4/3/18  Yes Althea Alvarenga MD   budesonide-formoterol (SYMBICORT) 80-4.5 MCG/ACT AERO Inhale 2 puffs into the lungs 2 times daily 1/23/18  Yes Althea Alvarenga MD   polyethylene glycol (GLYCOLAX) powder take 17GM (DISSOLVED IN WATER) by mouth once daily  Patient taking differently: as needed take 17GM (DISSOLVED IN WATER) by mouth once daily 12/21/17  Yes Mani Hurst MD   EPINEPHrine (EPIPEN 2-CRUZ) 0.3 MG/0.3ML SOAJ injection Use as directed for allergic reaction 12/21/17  Yes Mani Hurst MD   ibuprofen (ADVIL;MOTRIN) 600 MG tablet take 1 tablet by mouth every 6 hours if needed for pain 11/16/17  Yes Mani Hurst MD   albuterol sulfate HFA (VENTOLIN HFA) 108 (90 Base) MCG/ACT inhaler Inhale 2 puffs into the lungs every 6 hours as needed for Wheezing 10/11/17  Yes Althea Alvarenga MD   Cholecalciferol (VITAMIN D3) 5000 units TABS Take 1 tablet by mouth daily   Yes Historical Provider, MD   ferrous sulfate 325 (65 FE) MG tablet take 1 tablet by mouth once daily WITH BREAKFAST 5/22/17  Yes Mani Hurst MD   TRINTELLIX 20 MG TABS tablet take 1 tablet by mouth once daily 9/19/16  Yes Historical Provider, MD   tiotropium (Gerline Boyers) 18 MCG inhalation capsule Inhale 1 capsule into the lungs daily 4/3/18 5/22/18  Althea Alvarenga MD       Objective     Vitals:    10/10/18 1258   BP: 93/69   Pulse: 74       Lab Results   Component Value Date    LABA1C 5.5 04/19/2018       Assessment   Janak Carlos is a 46 y.o. female with     Diagnosis Orders   1.  Pes planus of both feet          Plan   · Custom orthotics dispensed to patient  · Patient eloped prior to being seen by physician   · Pt to RTC in 1 month; will have office staff call to let patient know she should return in one month for follow-up   · Please, call the office with any questions or concerns     Electronically signed by Cheryl Ochoa DPM on 10/10/2018 at 1:27 PM

## 2018-10-10 NOTE — PROGRESS NOTES
Patient instructed to remove shoes and socks, instructed to sit in exam chair. Current PCP name is Dr. Dakota Rose and date of last visit 6/19/18. Do you have a follow up visit scheduled?   Yes  If yes the date is 12/17/18

## 2018-11-20 ENCOUNTER — OFFICE VISIT (OUTPATIENT)
Dept: FAMILY MEDICINE CLINIC | Age: 53
End: 2018-11-20
Payer: MEDICARE

## 2018-11-20 VITALS
SYSTOLIC BLOOD PRESSURE: 132 MMHG | TEMPERATURE: 100.7 F | BODY MASS INDEX: 22.82 KG/M2 | WEIGHT: 142 LBS | HEIGHT: 66 IN | DIASTOLIC BLOOD PRESSURE: 86 MMHG

## 2018-11-20 DIAGNOSIS — K22.70 BARRETT'S ESOPHAGUS WITHOUT DYSPLASIA: ICD-10-CM

## 2018-11-20 DIAGNOSIS — F50.2 BN (BULIMIA NERVOSA): ICD-10-CM

## 2018-11-20 DIAGNOSIS — Z76.0 MEDICATION REFILL: ICD-10-CM

## 2018-11-20 DIAGNOSIS — E78.00 HIGH CHOLESTEROL: ICD-10-CM

## 2018-11-20 DIAGNOSIS — J30.2 SEASONAL ALLERGIES: ICD-10-CM

## 2018-11-20 DIAGNOSIS — K21.00 GASTROESOPHAGEAL REFLUX DISEASE WITH ESOPHAGITIS: Primary | ICD-10-CM

## 2018-11-20 PROCEDURE — G8427 DOCREV CUR MEDS BY ELIG CLIN: HCPCS | Performed by: STUDENT IN AN ORGANIZED HEALTH CARE EDUCATION/TRAINING PROGRAM

## 2018-11-20 PROCEDURE — 99213 OFFICE O/P EST LOW 20 MIN: CPT | Performed by: STUDENT IN AN ORGANIZED HEALTH CARE EDUCATION/TRAINING PROGRAM

## 2018-11-20 PROCEDURE — 99214 OFFICE O/P EST MOD 30 MIN: CPT | Performed by: STUDENT IN AN ORGANIZED HEALTH CARE EDUCATION/TRAINING PROGRAM

## 2018-11-20 PROCEDURE — G8420 CALC BMI NORM PARAMETERS: HCPCS | Performed by: STUDENT IN AN ORGANIZED HEALTH CARE EDUCATION/TRAINING PROGRAM

## 2018-11-20 PROCEDURE — 3017F COLORECTAL CA SCREEN DOC REV: CPT | Performed by: STUDENT IN AN ORGANIZED HEALTH CARE EDUCATION/TRAINING PROGRAM

## 2018-11-20 PROCEDURE — 1036F TOBACCO NON-USER: CPT | Performed by: STUDENT IN AN ORGANIZED HEALTH CARE EDUCATION/TRAINING PROGRAM

## 2018-11-20 PROCEDURE — G8482 FLU IMMUNIZE ORDER/ADMIN: HCPCS | Performed by: STUDENT IN AN ORGANIZED HEALTH CARE EDUCATION/TRAINING PROGRAM

## 2018-11-20 PROCEDURE — 90686 IIV4 VACC NO PRSV 0.5 ML IM: CPT | Performed by: FAMILY MEDICINE

## 2018-11-20 RX ORDER — OMEPRAZOLE 40 MG/1
CAPSULE, DELAYED RELEASE ORAL
Qty: 30 CAPSULE | Refills: 1 | Status: SHIPPED | OUTPATIENT
Start: 2018-11-20 | End: 2018-12-14 | Stop reason: SDUPTHER

## 2018-11-20 RX ORDER — IBUPROFEN 600 MG/1
TABLET ORAL
Qty: 30 TABLET | Refills: 3 | Status: SHIPPED | OUTPATIENT
Start: 2018-11-20 | End: 2018-11-20 | Stop reason: ALTCHOICE

## 2018-11-20 RX ORDER — OMEPRAZOLE 20 MG/1
CAPSULE, DELAYED RELEASE ORAL
Qty: 60 CAPSULE | Refills: 3 | Status: SHIPPED | OUTPATIENT
Start: 2018-11-20 | End: 2018-11-20 | Stop reason: SDUPTHER

## 2018-11-20 RX ORDER — ATORVASTATIN CALCIUM 20 MG/1
20 TABLET, FILM COATED ORAL DAILY
Qty: 30 TABLET | Refills: 3 | Status: SHIPPED | OUTPATIENT
Start: 2018-11-20 | End: 2019-05-04 | Stop reason: SDUPTHER

## 2018-11-20 RX ORDER — AZITHROMYCIN 250 MG/1
250 TABLET, FILM COATED ORAL SEE ADMIN INSTRUCTIONS
Qty: 6 TABLET | Refills: 0 | Status: SHIPPED | OUTPATIENT
Start: 2018-11-20 | End: 2018-11-25

## 2018-11-20 RX ORDER — CETIRIZINE HYDROCHLORIDE 10 MG/1
10 TABLET ORAL DAILY
Qty: 30 TABLET | Refills: 0 | Status: SHIPPED | OUTPATIENT
Start: 2018-11-20 | End: 2018-11-20

## 2018-11-20 ASSESSMENT — ENCOUNTER SYMPTOMS
NAUSEA: 0
EYE DISCHARGE: 0
BACK PAIN: 0
VOICE CHANGE: 0
EYE ITCHING: 0
CONSTIPATION: 0
RHINORRHEA: 0
ABDOMINAL PAIN: 0
WHEEZING: 0
DIARRHEA: 0
SORE THROAT: 1
PHOTOPHOBIA: 0
TROUBLE SWALLOWING: 0
SINUS PAIN: 0
SINUS PRESSURE: 0
EYE PAIN: 0
SHORTNESS OF BREATH: 0
BLOOD IN STOOL: 0
COUGH: 0
EYE REDNESS: 0
CHEST TIGHTNESS: 0

## 2018-11-20 NOTE — PATIENT INSTRUCTIONS
Thank you for letting us take care of you today. We hope all your questions were addressed. If a question was overlooked or something else comes to mind after you return home, please contact a member of your Care Team listed below. Please make sure you have a routine office visit set up to follow-up on [address]. Your Care Team at [place] [id number] is Team #4  Vick Marmolejo MD (Faculty)  MD Sonido Lee [facility] Andrea (Resident)  Tanika Vasquez MD (Resident)  Jazmyne Marquez MD (Resident)  Abdelrahman Lowe MD (Resident)  Spring Rowland MD (Resident)  Dennise Severin, LPN Lavonna Silva., DERIK Ibanez., RMA Antonetta Gilford ([address] [facility])  Zhang Bright RN, ([address] [facility])  Xu Galicia, Ph.D., (Behavioral Services)  Marylee Little, [facility] (Clinical Pharmacist)       Office phone number: [phone]    If you need to get in right away due to illness, please be advised we have \"Same Day\" appointments available Monday-Friday. Please call us at [phone] option #3 to schedule your \"Same Day\" appointment.

## 2018-11-20 NOTE — PROGRESS NOTES
Attending Physician Statement  I have discussed the care of John Romero, including pertinent history and exam findings,  with the resident. I have seen and examined the patient and the key elements of all parts of the encounter have been performed by me. I agree with the assessment, plan and orders as documented by the resident. (Beverly Rocha) - Claudia Mandujano M.D  Vitals:    11/20/18 1031   BP: 132/86   Temp: 100.7 °F (38.2 °C)     1. Gastroesophageal reflux disease with esophagitis    2. Vargas's esophagus without dysplasia    3. High cholesterol    4. Medication refill    5. Seasonal allergies    6. BN (bulimia nervosa)      refer to GI. Stop nsaids. Increase prilosec to 40mg.

## 2018-11-23 RX ORDER — TAZAROTENE 1 MG/G
CREAM TOPICAL
Qty: 30 G | Refills: 2 | Status: SHIPPED | OUTPATIENT
Start: 2018-11-23 | End: 2018-12-03 | Stop reason: SDUPTHER

## 2018-12-03 ENCOUNTER — OFFICE VISIT (OUTPATIENT)
Dept: DERMATOLOGY | Age: 53
End: 2018-12-03
Payer: MEDICARE

## 2018-12-03 VITALS
DIASTOLIC BLOOD PRESSURE: 75 MMHG | SYSTOLIC BLOOD PRESSURE: 104 MMHG | HEART RATE: 80 BPM | TEMPERATURE: 98.6 F | BODY MASS INDEX: 22.69 KG/M2 | OXYGEN SATURATION: 98 % | HEIGHT: 66 IN | WEIGHT: 141.2 LBS

## 2018-12-03 DIAGNOSIS — L70.0 ACNE VULGARIS: Primary | ICD-10-CM

## 2018-12-03 PROCEDURE — 1036F TOBACCO NON-USER: CPT | Performed by: DERMATOLOGY

## 2018-12-03 PROCEDURE — 99213 OFFICE O/P EST LOW 20 MIN: CPT | Performed by: DERMATOLOGY

## 2018-12-03 PROCEDURE — 3017F COLORECTAL CA SCREEN DOC REV: CPT | Performed by: DERMATOLOGY

## 2018-12-03 PROCEDURE — G8427 DOCREV CUR MEDS BY ELIG CLIN: HCPCS | Performed by: DERMATOLOGY

## 2018-12-03 PROCEDURE — G8420 CALC BMI NORM PARAMETERS: HCPCS | Performed by: DERMATOLOGY

## 2018-12-03 PROCEDURE — G8482 FLU IMMUNIZE ORDER/ADMIN: HCPCS | Performed by: DERMATOLOGY

## 2018-12-03 RX ORDER — SPIRONOLACTONE 100 MG/1
TABLET, FILM COATED ORAL
Qty: 30 TABLET | Refills: 6 | Status: CANCELLED | OUTPATIENT
Start: 2018-12-03

## 2018-12-03 RX ORDER — TAZAROTENE 1 MG/G
CREAM TOPICAL
Qty: 30 G | Refills: 11 | Status: SHIPPED | OUTPATIENT
Start: 2018-12-03 | End: 2019-12-13 | Stop reason: SDUPTHER

## 2018-12-14 DIAGNOSIS — Z76.0 MEDICATION REFILL: ICD-10-CM

## 2018-12-14 DIAGNOSIS — J30.2 SEASONAL ALLERGIES: ICD-10-CM

## 2018-12-14 DIAGNOSIS — K21.00 GASTROESOPHAGEAL REFLUX DISEASE WITH ESOPHAGITIS: ICD-10-CM

## 2018-12-14 DIAGNOSIS — K59.00 CONSTIPATION, UNSPECIFIED CONSTIPATION TYPE: ICD-10-CM

## 2018-12-14 NOTE — TELEPHONE ENCOUNTER
Please Approve or Refuse.        Next Visit Date:  12/18/2018    Hemoglobin A1C (%)   Date Value   04/19/2018 5.5   02/12/2014 5.7             ( goal A1C is < 7)   No results found for: LABMICR  LDL Cholesterol (mg/dL)   Date Value   05/02/2017 152 (H)       (goal LDL is <100)   AST (U/L)   Date Value   02/05/2018 18     ALT (U/L)   Date Value   02/05/2018 15     BUN (mg/dL)   Date Value   12/11/2017 15     BP Readings from Last 3 Encounters:   12/03/18 104/75   11/20/18 132/86   10/10/18 93/69          (goal 120/80)        Patient Active Problem List:     Vargas esophagus     Acne     Dental caries     Smoking     Hypoxia     Empyema (HCC)     GERD (gastroesophageal reflux disease)     H/O chronic respiratory failure     COPD (chronic obstructive pulmonary disease) (HCC)     Orthostatic hypotension     Medication refill     Seasonal allergies     Acne comedone     Bunion     Viral URI     High cholesterol     Ear fullness, right

## 2018-12-15 RX ORDER — OMEPRAZOLE 40 MG/1
CAPSULE, DELAYED RELEASE ORAL
Qty: 30 CAPSULE | Refills: 1 | Status: SHIPPED | OUTPATIENT
Start: 2018-12-15 | End: 2019-05-17 | Stop reason: SDUPTHER

## 2018-12-18 ENCOUNTER — OFFICE VISIT (OUTPATIENT)
Dept: FAMILY MEDICINE CLINIC | Age: 53
End: 2018-12-18
Payer: MEDICARE

## 2018-12-18 DIAGNOSIS — F50.9 EATING DISORDER: Primary | ICD-10-CM

## 2018-12-18 PROCEDURE — 90791 PSYCH DIAGNOSTIC EVALUATION: CPT | Performed by: PSYCHOLOGIST

## 2018-12-18 RX ORDER — ETHYL ALCOHOL 62 %
GEL (ML) TOPICAL
Qty: 60 TABLET | Refills: 3 | Status: SHIPPED | OUTPATIENT
Start: 2018-12-18 | End: 2019-05-17 | Stop reason: SDUPTHER

## 2018-12-18 NOTE — PATIENT INSTRUCTIONS
I will reach out to care coordinator Tavia Godfrey about insurance options and understanding how to get coverage. Call the [facility] to see if they can give you a list of inpatient/outpatient providers who take your insurance. Call the number on the back of your insurance card for covered provider. Another option is to investigate through the [employer] Find a Therapist tool. Can use search options to narrow by insurance and by specialty.

## 2018-12-18 NOTE — PROGRESS NOTES
Behavioral Health Consultation  Dalia Montesinos, Ph.D.  Clinical Psychologist  12/18/2018  1:20 PM      Start time: 1:25  End time: 2:50  Time spent with patient: 25 minutes  This is patient's first [facility] appointment. Reason for Consult:  Bulimia nervosa  Referring Provider: Dr. Odalys Gomez    Pt provided informed consent for the behavioral health program. Discussed with patient model of service, the limits of confidentiality (i.e. abuse reporting, suicide intervention, etc.) and short-term intervention focused approach. Pt indicated understanding. SUBJECTIVE    Pt is here for help getting access to specialized eating disorder care. Pt shared she abused cocaine and alcohol since she was young until 5 years ago, and since that time has been sober. She has also been bulimic for many years. She reports she continues to purge about once a week and wants help to let go of this behavior for good, because she recognizes it continues to damage her health. She is currently taking Trintellix. Pt is currently attending [facility] and is working through the steps. She believes she needs specialized eating disorder care, possibly inpatient care, in order to fully recover from bulimia nervosa. She reports she [facility] the [facility] a few months back and was told that their inpatient program does not take her insurance and was given a list of other providers who do, though she no longer has access to the full list.  She is concerned about making any changes to her insurance plan that might give her better access to eating disorder care, because her current insurance reportedly provided excellent coverage for her lung problems. She is hoping for local care, because leaving the area right now would be difficult, given that her daughter is ill and she has a dog. Pt denies suicidal ideation since she became sober.   She reports multiple prior psychiatric hospitalizations, most recently in 2008

## 2018-12-20 ENCOUNTER — TELEPHONE (OUTPATIENT)
Dept: PULMONOLOGY | Age: 53
End: 2018-12-20

## 2019-01-22 ENCOUNTER — OFFICE VISIT (OUTPATIENT)
Dept: PULMONOLOGY | Age: 54
End: 2019-01-22
Payer: MEDICARE

## 2019-01-22 VITALS
DIASTOLIC BLOOD PRESSURE: 78 MMHG | OXYGEN SATURATION: 98 % | HEIGHT: 66 IN | SYSTOLIC BLOOD PRESSURE: 111 MMHG | HEART RATE: 82 BPM | WEIGHT: 143 LBS | RESPIRATION RATE: 14 BRPM | BODY MASS INDEX: 22.98 KG/M2

## 2019-01-22 DIAGNOSIS — R53.83 FATIGUE DUE TO DEPRESSION: ICD-10-CM

## 2019-01-22 DIAGNOSIS — J44.9 CHRONIC OBSTRUCTIVE PULMONARY DISEASE, UNSPECIFIED COPD TYPE (HCC): Primary | ICD-10-CM

## 2019-01-22 DIAGNOSIS — K21.9 GASTROESOPHAGEAL REFLUX DISEASE, ESOPHAGITIS PRESENCE NOT SPECIFIED: ICD-10-CM

## 2019-01-22 DIAGNOSIS — F32.A FATIGUE DUE TO DEPRESSION: ICD-10-CM

## 2019-01-22 DIAGNOSIS — J45.40 MODERATE PERSISTENT ASTHMA WITHOUT COMPLICATION: ICD-10-CM

## 2019-01-22 PROCEDURE — 1036F TOBACCO NON-USER: CPT | Performed by: INTERNAL MEDICINE

## 2019-01-22 PROCEDURE — G8420 CALC BMI NORM PARAMETERS: HCPCS | Performed by: INTERNAL MEDICINE

## 2019-01-22 PROCEDURE — 99214 OFFICE O/P EST MOD 30 MIN: CPT | Performed by: INTERNAL MEDICINE

## 2019-01-22 PROCEDURE — 3017F COLORECTAL CA SCREEN DOC REV: CPT | Performed by: INTERNAL MEDICINE

## 2019-01-22 PROCEDURE — G8926 SPIRO NO PERF OR DOC: HCPCS | Performed by: INTERNAL MEDICINE

## 2019-01-22 PROCEDURE — 3023F SPIROM DOC REV: CPT | Performed by: INTERNAL MEDICINE

## 2019-01-22 PROCEDURE — G8482 FLU IMMUNIZE ORDER/ADMIN: HCPCS | Performed by: INTERNAL MEDICINE

## 2019-01-22 PROCEDURE — G8427 DOCREV CUR MEDS BY ELIG CLIN: HCPCS | Performed by: INTERNAL MEDICINE

## 2019-02-13 ENCOUNTER — CARE COORDINATION (OUTPATIENT)
Dept: CARE COORDINATION | Age: 54
End: 2019-02-13

## 2019-02-20 ENCOUNTER — CARE COORDINATION (OUTPATIENT)
Dept: CARE COORDINATION | Age: 54
End: 2019-02-20

## 2019-03-05 ENCOUNTER — CARE COORDINATION (OUTPATIENT)
Dept: CARE COORDINATION | Age: 54
End: 2019-03-05

## 2019-03-18 ENCOUNTER — CARE COORDINATION (OUTPATIENT)
Dept: CARE COORDINATION | Age: 54
End: 2019-03-18

## 2019-04-15 RX ORDER — BECLOMETHASONE DIPROPIONATE HFA 40 UG/1
AEROSOL, METERED RESPIRATORY (INHALATION)
Qty: 1 INHALER | Refills: 3 | Status: SHIPPED | OUTPATIENT
Start: 2019-04-15 | End: 2019-06-19 | Stop reason: ALTCHOICE

## 2019-04-15 NOTE — TELEPHONE ENCOUNTER
Dr Madison Villela, patient is current and due in for an appointment on 7/23/19. Per last dictation patient to continue Qvar. Please sign for refill if ok. Thank you.

## 2019-05-01 DIAGNOSIS — E78.00 HIGH CHOLESTEROL: ICD-10-CM

## 2019-05-01 NOTE — TELEPHONE ENCOUNTER
Please address the medication refill and close the encounter. If I can be of assistance, please route to the applicable pool. Thank you.   Last visit: [id number]  Last Med refill: [id number]  Does patient have enough medication for 72 hours:  Next Visit Date:  Future Appointments   Date Time Provider Zane Schaefer   5/6/2019  3:30 PM MD Jordan Tony FP [facility]   7/23/2019 11:00 AM Joseph Rice MD Resp Spec [id number]   12/3/2019  1:30 PM Amira Pak MD  derm [address] Maintenance   Topic Date Due    HIV screen  07/17/1979    Shingles Vaccine (1 of 2) 07/17/2014    Breast cancer screen  07/25/2019    Lipid screen  05/02/2022    DTaP/Tdap/Td vaccine (2 - Td) 04/30/2024    Colon cancer screen colonoscopy  07/14/2026    Flu vaccine  Completed    Pneumococcal 0-64 years Vaccine  Completed    Hepatitis C screen  Completed       Hemoglobin A1C (%)   Date Value   04/19/2018 5.5   02/12/2014 5.7             ( goal A1C is < 7)   No results found for: LABMICR  LDL Cholesterol (mg/dL)   Date Value   05/02/2017 152 (H)   12/13/2011 141 (H)       (goal LDL is <100)   AST (U/L)   Date Value   02/05/2018 18     ALT (U/L)   Date Value   02/05/2018 15     BUN (mg/dL)   Date Value   12/11/2017 15     BP Readings from Last 3 Encounters:   01/22/19 111/78   12/03/18 104/75   11/20/18 132/86          (goal 120/80)    All Future Testing planned in [facility]  Lab Frequency Next Occurrence               Patient Active Problem List:     Vargas esophagus     Acne     Dental caries     Smoking     Hypoxia     Empyema (HCC)     GERD (gastroesophageal reflux disease)     H/O chronic respiratory failure     COPD (chronic obstructive pulmonary disease) (HCC)     Orthostatic hypotension     Medication refill     Seasonal allergies     Acne comedone     Bunion     Viral URI     High cholesterol     Ear fullness, right

## 2019-05-04 RX ORDER — ATORVASTATIN CALCIUM 20 MG/1
20 TABLET, FILM COATED ORAL DAILY
Qty: 30 TABLET | Refills: 3 | Status: SHIPPED | OUTPATIENT
Start: 2019-05-04 | End: 2019-05-17 | Stop reason: SDUPTHER

## 2019-05-17 ENCOUNTER — HOSPITAL ENCOUNTER (OUTPATIENT)
Age: 54
Setting detail: SPECIMEN
Discharge: HOME | End: 2019-05-17
Payer: MEDICARE

## 2019-05-17 ENCOUNTER — OFFICE VISIT (OUTPATIENT)
Dept: FAMILY MEDICINE CLINIC | Age: 54
End: 2019-05-17
Payer: MEDICARE

## 2019-05-17 VITALS
HEART RATE: 85 BPM | WEIGHT: 142.6 LBS | BODY MASS INDEX: 22.92 KG/M2 | HEIGHT: 66 IN | SYSTOLIC BLOOD PRESSURE: 118 MMHG | DIASTOLIC BLOOD PRESSURE: 81 MMHG

## 2019-05-17 DIAGNOSIS — J06.9 VIRAL URI: ICD-10-CM

## 2019-05-17 DIAGNOSIS — K59.00 CONSTIPATION, UNSPECIFIED CONSTIPATION TYPE: ICD-10-CM

## 2019-05-17 DIAGNOSIS — K21.00 GASTROESOPHAGEAL REFLUX DISEASE WITH ESOPHAGITIS: ICD-10-CM

## 2019-05-17 DIAGNOSIS — R35.0 URINARY FREQUENCY: ICD-10-CM

## 2019-05-17 DIAGNOSIS — Z76.0 MEDICATION REFILL: ICD-10-CM

## 2019-05-17 DIAGNOSIS — E78.00 HIGH CHOLESTEROL: ICD-10-CM

## 2019-05-17 DIAGNOSIS — R35.0 URINARY FREQUENCY: Primary | ICD-10-CM

## 2019-05-17 LAB
-: NORMAL
AMORPHOUS: NORMAL
BACTERIA: NORMAL
BILIRUBIN URINE: NEGATIVE
BILIRUBIN, POC: NORMAL
BLOOD URINE, POC: NORMAL
CASTS UA: NORMAL /LPF (ref 0–8)
CLARITY, POC: CLEAR
COLOR, POC: YELLOW
COLOR: YELLOW
CRYSTALS, UA: NORMAL /HPF
EPITHELIAL CELLS UA: NORMAL /HPF (ref 0–5)
GLUCOSE URINE, POC: NORMAL
GLUCOSE URINE: NEGATIVE
KETONES, POC: NORMAL
KETONES, URINE: NEGATIVE
LEUKOCYTE EST, POC: NORMAL
LEUKOCYTE ESTERASE, URINE: NEGATIVE
MUCUS: NORMAL
NITRITE, POC: NORMAL
NITRITE, URINE: NEGATIVE
OTHER OBSERVATIONS UA: NORMAL
PH UA: 6 (ref 5–8)
PH, POC: 6
PROTEIN UA: NEGATIVE
PROTEIN, POC: NORMAL
RBC UA: NORMAL /HPF (ref 0–4)
RENAL EPITHELIAL, UA: NORMAL /HPF
SPECIFIC GRAVITY UA: 1.01 (ref 1–1.03)
SPECIFIC GRAVITY, POC: 1.01
TRICHOMONAS: NORMAL
TURBIDITY: CLEAR
URINE HGB: NEGATIVE
UROBILINOGEN, POC: 0.2
UROBILINOGEN, URINE: NORMAL
WBC UA: NORMAL /HPF (ref 0–5)
YEAST: NORMAL

## 2019-05-17 PROCEDURE — 99213 OFFICE O/P EST LOW 20 MIN: CPT | Performed by: STUDENT IN AN ORGANIZED HEALTH CARE EDUCATION/TRAINING PROGRAM

## 2019-05-17 PROCEDURE — 81001 URINALYSIS AUTO W/SCOPE: CPT | Performed by: STUDENT IN AN ORGANIZED HEALTH CARE EDUCATION/TRAINING PROGRAM

## 2019-05-17 PROCEDURE — 1036F TOBACCO NON-USER: CPT | Performed by: FAMILY MEDICINE

## 2019-05-17 PROCEDURE — 81002 URINALYSIS NONAUTO W/O SCOPE: CPT | Performed by: STUDENT IN AN ORGANIZED HEALTH CARE EDUCATION/TRAINING PROGRAM

## 2019-05-17 PROCEDURE — G8420 CALC BMI NORM PARAMETERS: HCPCS | Performed by: FAMILY MEDICINE

## 2019-05-17 PROCEDURE — G8427 DOCREV CUR MEDS BY ELIG CLIN: HCPCS | Performed by: FAMILY MEDICINE

## 2019-05-17 PROCEDURE — 3017F COLORECTAL CA SCREEN DOC REV: CPT | Performed by: FAMILY MEDICINE

## 2019-05-17 RX ORDER — AMOXICILLIN 250 MG
CAPSULE ORAL
Qty: 60 TABLET | Refills: 3 | Status: SHIPPED | OUTPATIENT
Start: 2019-05-17 | End: 2019-10-13 | Stop reason: SDUPTHER

## 2019-05-17 RX ORDER — TIZANIDINE 4 MG/1
4 TABLET ORAL 3 TIMES DAILY
Qty: 30 TABLET | Refills: 0 | Status: SHIPPED | OUTPATIENT
Start: 2019-05-17 | End: 2019-07-10 | Stop reason: SDUPTHER

## 2019-05-17 RX ORDER — FLUTICASONE PROPIONATE 50 MCG
2 SPRAY, SUSPENSION (ML) NASAL DAILY
Qty: 1 BOTTLE | Refills: 0 | Status: SHIPPED | OUTPATIENT
Start: 2019-05-17 | End: 2020-01-28

## 2019-05-17 RX ORDER — ATORVASTATIN CALCIUM 20 MG/1
20 TABLET, FILM COATED ORAL DAILY
Qty: 30 TABLET | Refills: 3 | Status: SHIPPED | OUTPATIENT
Start: 2019-05-17 | End: 2020-01-14

## 2019-05-17 RX ORDER — OMEPRAZOLE 40 MG/1
CAPSULE, DELAYED RELEASE ORAL
Qty: 30 CAPSULE | Refills: 3 | Status: SHIPPED | OUTPATIENT
Start: 2019-05-17 | End: 2019-08-04 | Stop reason: SDUPTHER

## 2019-05-17 RX ORDER — GUAIFENESIN 100 MG/5ML
10 SYRUP ORAL EVERY 4 HOURS PRN
Qty: 1 BOTTLE | Refills: 0 | Status: SHIPPED | OUTPATIENT
Start: 2019-05-17 | End: 2019-08-16

## 2019-05-17 ASSESSMENT — ENCOUNTER SYMPTOMS
ABDOMINAL PAIN: 0
SHORTNESS OF BREATH: 0
ANAL BLEEDING: 0
DIARRHEA: 0
VOMITING: 0
NAUSEA: 0
WHEEZING: 0
COUGH: 0
CONSTIPATION: 0

## 2019-05-17 NOTE — PROGRESS NOTES
Subjective:    Antoinette Andrea is a 46 y.o. female with  has a past medical history of Vargas's esophagus, Bipolar disorder ([address] Chronic obstructive pulmonary disease (COPD) ([address] Depression, Dyspnea on exertion, Eating disorder, Empyema ([address] GERD (gastroesophageal reflux disease), H/O chronic respiratory failure, Moderate persistent asthma, Seasonal allergies, Smoking, and Substance abuse ([address] Family History   Problem Relation Age of Onset    Asthma Mother     Stroke Father     Heart Attack Father     Cancer Maternal Grandmother         skin cancer    Diabetes Maternal Uncle        Presented tot office today for:  Chief Complaint   Patient presents with    COPD    Hypertension    Urinary Frequency       Patient is a 26-year-old female presents the office today for follow-up related to urinary frequency. Patient states she's been exercising urinary frequency for the past 9 months to one year however recently she started expressing burning upon urination as well. Review of Systems   Constitutional: Negative for chills and fever. Respiratory: Negative for cough, shortness of breath and wheezing. Cardiovascular: Negative for chest pain. Gastrointestinal: Negative for abdominal pain, anal bleeding, constipation, diarrhea, nausea and vomiting. Genitourinary: Positive for dysuria and frequency. Negative for difficulty urinating and urgency. Neurological: Negative for dizziness, weakness, light-headedness and headaches. Objective:    /81 (Site: Left Upper Arm, Position: Sitting, Cuff Size: Medium Adult)   Pulse 85   Ht 5' 6\" (1.676 m)   Wt 142 lb 9.6 oz (64.7 kg)   BMI 23.02 kg/m²    BP Readings from Last 3 Encounters:   05/17/19 118/81   01/22/19 111/78   12/03/18 104/75     Physical Exam   Constitutional: She is oriented to person, place, and time. She appears well-developed and well-nourished. No distress. HENT:   Head: Normocephalic and atraumatic.    Eyes: Pupils are equal, round, and reactive to light. Neck: No JVD present. No thyromegaly present. Cardiovascular: Normal rate, regular rhythm and normal heart sounds. Exam reveals no gallop and no friction rub. No murmur heard. Pulmonary/Chest: Effort normal and breath sounds normal. No respiratory distress. She has no wheezes. Abdominal: Soft. Bowel sounds are normal. She exhibits no distension. There is no tenderness. Neurological: She is alert and oriented to person, place, and time. Skin: Skin is warm and dry. No rash noted. She is not diaphoretic. No erythema. Psychiatric: She has a normal mood and affect. Lab Results   Component Value Date    WBC 17.4 (H) 12/11/2017    HGB 13.7 12/11/2017    HCT 42.2 12/11/2017     12/11/2017    CHOL 219 (H) 12/13/2011    TRIG 80 12/13/2011    HDL 65 05/02/2017    ALT 15 02/05/2018    AST 18 02/05/2018     12/11/2017    K 4.1 06/19/2018     12/11/2017    CREATININE 0.56 12/11/2017    BUN 15 12/11/2017    CO2 21 12/11/2017    TSH 0.75 08/22/2017    INR 1.2 02/21/2014    LABA1C 5.5 04/19/2018     Lab Results   Component Value Date    CALCIUM 8.4 (L) 12/11/2017     Lab Results   Component Value Date    LDLCHOLESTEROL 152 (H) 05/02/2017       Assessment and Plan:    1. Viral URI  - guaiFENesin (ALTARUSSIN) 100 MG/5ML syrup; Take 10 mLs by mouth every 4 hours as needed for Cough  Dispense: 1 Bottle; Refill: 0  - fluticasone (FLONASE) 50 MCG/ACT nasal spray; 2 sprays by Each Nare route daily 2 Sprays in each nostril  Dispense: 1 Bottle; Refill: 0    2. Urinary frequency  - POCT Urinalysis no Micro - negative   - [facility], [facility], DO, OB/GYN, [facility]  - Urinalysis With Microscopic; Future  - Urine Culture; Future    3. High cholesterol  - atorvastatin (LIPITOR) 20 MG tablet; Take 1 tablet by mouth daily  Dispense: 30 tablet; Refill: 3    4.  Medication refill  - omeprazole (PRILOSEC) 40 MG delayed release capsule; take 2 capsules by mouth once daily  Dispense: 30 capsule; Refill: 3  - senna-docusate (RA P COL-RITE) 8.6-50 MG per tablet; take 2 tablets by mouth once daily if needed for congestion  Dispense: 60 tablet; Refill: 3  - tiZANidine (ZANAFLEX) 4 MG tablet; Take 1 tablet by mouth 3 times daily  Dispense: 30 tablet; Refill: 0    5. Gastroesophageal reflux disease with esophagitis - Vargas's  - Last EGD 3 years ago, repeat in 2 years   - omeprazole (PRILOSEC) 40 MG delayed release capsule; take 2 capsules by mouth once daily  Dispense: 30 capsule; Refill: 3    6. Constipation, unspecified constipation type  - senna-docusate (RA P COL-RITE) 8.6-50 MG per tablet; take 2 tablets by mouth once daily if needed for congestion  Dispense: 60 tablet; Refill: 3          Requested Prescriptions     Signed Prescriptions Disp Refills    atorvastatin (LIPITOR) 20 MG tablet 30 tablet 3     Sig: Take 1 tablet by mouth daily    omeprazole (PRILOSEC) 40 MG delayed release capsule 30 capsule 3     Sig: take 2 capsules by mouth once daily    senna-docusate (RA P COL-RITE) 8.6-50 MG per tablet 60 tablet 3     Sig: take 2 tablets by mouth once daily if needed for congestion    tiZANidine (ZANAFLEX) 4 MG tablet 30 tablet 0     Sig: Take 1 tablet by mouth 3 times daily    guaiFENesin (ALTARUSSIN) 100 MG/5ML syrup 1 Bottle 0     Sig: Take 10 mLs by mouth every 4 hours as needed for Cough    fluticasone (FLONASE) 50 MCG/ACT nasal spray 1 Bottle 0     Si sprays by Each Nare route daily 2 Sprays in each nostril       Medications Discontinued During This Encounter   Medication Reason    atorvastatin (LIPITOR) 20 MG tablet REORDER    omeprazole (PRILOSEC) 40 MG delayed release capsule REORDER    RA P COL-RITE 8.6-50 MG per tablet REORDER       No follow-ups on file. Laurel received counseling on the following healthy behaviors: nutrition and exercise  Reviewed prior labs and health maintenance  Continue current medications, diet and exercise.   Discussed use, benefit, and side effects of prescribed medications. Barriers to medication compliance addressed. Patient given educational materials - see patient instructions  Was a self-tracking handout given in paper form or via [email or web address]? Requested Prescriptions     Signed Prescriptions Disp Refills    atorvastatin (LIPITOR) 20 MG tablet 30 tablet 3     Sig: Take 1 tablet by mouth daily    omeprazole (PRILOSEC) 40 MG delayed release capsule 30 capsule 3     Sig: take 2 capsules by mouth once daily    senna-docusate (RA P COL-RITE) 8.6-50 MG per tablet 60 tablet 3     Sig: take 2 tablets by mouth once daily if needed for congestion    tiZANidine (ZANAFLEX) 4 MG tablet 30 tablet 0     Sig: Take 1 tablet by mouth 3 times daily    guaiFENesin (ALTARUSSIN) 100 MG/5ML syrup 1 Bottle 0     Sig: Take 10 mLs by mouth every 4 hours as needed for Cough    fluticasone (FLONASE) 50 MCG/ACT nasal spray 1 Bottle 0     Si sprays by Each Nare route daily 2 Sprays in each nostril       All patient questions answered. Patient voiced understanding. Quality Measures    Body mass index is 23.02 kg/m². Normal. Weight control planned discussed Healthy diet and regular exercise. BP: 118/81 Blood pressure is normal. Treatment plan consists of No treatment change needed.     Lab Results   Component Value Date    LDLCHOLESTEROL 152 (H) 2017    (goal LDL reduction with dx if diabetes is 50% LDL reduction)      PHQ Scores 2017   PHQ2 Score 0 0   PHQ9 Score 0 0     Interpretation of Total Score Depression Severity: 1-4 = Minimal depression, 5-9 = Mild depression, 10-14 = Moderate depression, 15-19 = Moderately severe depression, 20-27 = Severe depression

## 2019-05-18 LAB
CULTURE: NORMAL
Lab: NORMAL
SPECIMEN DESCRIPTION: NORMAL

## 2019-05-21 ENCOUNTER — TELEPHONE (OUTPATIENT)
Dept: FAMILY MEDICINE CLINIC | Age: 54
End: 2019-05-21

## 2019-05-21 NOTE — TELEPHONE ENCOUNTER
Patient calling states she was seen by you on 6/17/19 and you told her you would give her an antibiotic if her condition didn't improve.   Patient states no improvement    Last visit: 5/17/19  Last Med refill:     Does patient have enough medication for 72 hours: Yes    Next Visit Date:  Future Appointments   Date Time Provider Zane Schaefer   7/23/2019 11:00 AM [address] JEANINE MD Resp Spec [facility]   12/3/2019  1:30 PM Jen Gates MD  derm [address] Maintenance   Topic Date Due    HIV screen  07/17/1979    Shingles Vaccine (1 of 2) 07/17/2014    Breast cancer screen  07/25/2019    Lipid screen  05/02/2022    DTaP/Tdap/Td vaccine (2 - Td) 04/30/2024    Colon cancer screen colonoscopy  07/14/2026    Flu vaccine  Completed    Pneumococcal 0-64 years Vaccine  Completed    Hepatitis C screen  Completed       Hemoglobin A1C (%)   Date Value   04/19/2018 5.5   02/12/2014 5.7             ( goal A1C is < 7)   No results found for: LABMICR  LDL Cholesterol (mg/dL)   Date Value   05/02/2017 152 (H)   12/13/2011 141 (H)       (goal LDL is <100)   AST (U/L)   Date Value   02/05/2018 18     ALT (U/L)   Date Value   02/05/2018 15     BUN (mg/dL)   Date Value   12/11/2017 15     BP Readings from Last 3 Encounters:   05/17/19 118/81   01/22/19 111/78   12/03/18 104/75          (goal 120/80)    All Future Testing planned in [facility]  Lab Frequency Next Occurrence               Patient Active Problem List:     Vargas esophagus     Acne     Dental caries     Smoking     Hypoxia     Empyema (HCC)     GERD (gastroesophageal reflux disease)     H/O chronic respiratory failure     COPD (chronic obstructive pulmonary disease) (HCC)     Orthostatic hypotension     Medication refill     Seasonal allergies     Acne comedone     Bunion     Viral URI     High cholesterol     Ear fullness, right

## 2019-05-22 NOTE — TELEPHONE ENCOUNTER
Patient calling to check status on medication request for medicatin. Patient states she has green and yellow phlegm. please review.

## 2019-05-24 RX ORDER — AZITHROMYCIN 250 MG/1
250 TABLET, FILM COATED ORAL SEE ADMIN INSTRUCTIONS
Qty: 6 TABLET | Refills: 0 | Status: SHIPPED | OUTPATIENT
Start: 2019-05-24 | End: 2019-05-29

## 2019-06-19 ENCOUNTER — TELEPHONE (OUTPATIENT)
Dept: PULMONOLOGY | Age: 54
End: 2019-06-19

## 2019-06-19 DIAGNOSIS — J45.40 MODERATE PERSISTENT ASTHMA WITHOUT COMPLICATION: Primary | ICD-10-CM

## 2019-07-10 DIAGNOSIS — Z76.0 MEDICATION REFILL: ICD-10-CM

## 2019-07-11 RX ORDER — TIZANIDINE 4 MG/1
TABLET ORAL
Qty: 30 TABLET | Refills: 0 | Status: SHIPPED | OUTPATIENT
Start: 2019-07-11 | End: 2020-12-08 | Stop reason: SDUPTHER

## 2019-08-04 DIAGNOSIS — Z76.0 MEDICATION REFILL: ICD-10-CM

## 2019-08-04 DIAGNOSIS — K21.00 GASTROESOPHAGEAL REFLUX DISEASE WITH ESOPHAGITIS: ICD-10-CM

## 2019-08-06 NOTE — TELEPHONE ENCOUNTER
Next Visit Date:  Future Appointments   Date Time Provider Zane Schaefer   8/27/2019  9:15 AM Ana Proctor MD Resp Spec [id number]   12/3/2019  1:30 PM Akira Sanchez MD  derm [address] Maintenance   Topic Date Due    HIV screen  07/17/1979    Shingles Vaccine (1 of 2) 07/17/2014    Low dose CT lung screening  07/17/2019    Breast cancer screen  07/25/2019    Flu vaccine (1) 09/01/2019    Lipid screen  05/02/2022    DTaP/Tdap/Td vaccine (2 - Td) 04/30/2024    Colon cancer screen colonoscopy  07/14/2026    Annual Wellness Visit (AWV)  07/17/2027    Pneumococcal 0-64 years Vaccine  Completed    Hepatitis C screen  Completed       Hemoglobin A1C (%)   Date Value   04/19/2018 5.5   02/12/2014 5.7             ( goal A1C is < 7)   No results found for: LABMICR  LDL Cholesterol (mg/dL)   Date Value   05/02/2017 152 (H)   12/13/2011 141 (H)       (goal LDL is <100)   AST (U/L)   Date Value   02/05/2018 18     ALT (U/L)   Date Value   02/05/2018 15     BUN (mg/dL)   Date Value   12/11/2017 15     BP Readings from Last 3 Encounters:   05/17/19 118/81   01/22/19 111/78   12/03/18 104/75          (goal 120/80)    All Future Testing planned in [facility]  Lab Frequency Next Occurrence               Patient Active Problem List:     Vargas esophagus     Acne     Dental caries     Smoking     Hypoxia     Empyema (HCC)     GERD (gastroesophageal reflux disease)     H/O chronic respiratory failure     COPD (chronic obstructive pulmonary disease) (HCC)     Orthostatic hypotension     Medication refill     Seasonal allergies     Acne comedone     Bunion     Viral URI     High cholesterol     Ear fullness, right

## 2019-08-07 RX ORDER — OMEPRAZOLE 40 MG/1
CAPSULE, DELAYED RELEASE ORAL
Qty: 60 CAPSULE | Refills: 1 | Status: SHIPPED | OUTPATIENT
Start: 2019-08-07 | End: 2020-02-18 | Stop reason: SDUPTHER

## 2019-08-16 ENCOUNTER — APPOINTMENT (OUTPATIENT)
Dept: GENERAL RADIOLOGY | Facility: CLINIC | Age: 54
End: 2019-08-16
Payer: MEDICARE

## 2019-08-16 ENCOUNTER — HOSPITAL ENCOUNTER (EMERGENCY)
Facility: CLINIC | Age: 54
Discharge: HOME | End: 2019-08-16
Attending: EMERGENCY MEDICINE
Payer: MEDICARE

## 2019-08-16 VITALS
TEMPERATURE: 97.9 F | HEART RATE: 76 BPM | DIASTOLIC BLOOD PRESSURE: 74 MMHG | SYSTOLIC BLOOD PRESSURE: 114 MMHG | RESPIRATION RATE: 20 BRPM | BODY MASS INDEX: 22.02 KG/M2 | WEIGHT: 137 LBS | OXYGEN SATURATION: 96 % | HEIGHT: 66 IN

## 2019-08-16 DIAGNOSIS — S99.001A UNSPECIFIED PHYSEAL FRACTURE OF RIGHT CALCANEUS, INITIAL ENCOUNTER FOR CLOSED FRACTURE: Primary | ICD-10-CM

## 2019-08-16 DIAGNOSIS — K08.89 DENTALGIA: ICD-10-CM

## 2019-08-16 PROCEDURE — 6370000000 HC RX 637 (ALT 250 FOR IP): Performed by: EMERGENCY MEDICINE

## 2019-08-16 PROCEDURE — 99283 EMERGENCY DEPT VISIT LOW MDM: CPT

## 2019-08-16 PROCEDURE — 73650 X-RAY EXAM OF HEEL: CPT

## 2019-08-16 RX ORDER — PENICILLIN V POTASSIUM 500 MG/1
500 TABLET ORAL 4 TIMES DAILY
Qty: 28 TABLET | Refills: 0 | Status: SHIPPED | OUTPATIENT
Start: 2019-08-16 | End: 2019-08-23

## 2019-08-16 RX ORDER — OXYCODONE HYDROCHLORIDE AND ACETAMINOPHEN 5; 325 MG/1; MG/1
1 TABLET ORAL EVERY 6 HOURS PRN
Qty: 20 TABLET | Refills: 0 | Status: SHIPPED | OUTPATIENT
Start: 2019-08-16 | End: 2019-08-21

## 2019-08-16 RX ORDER — IBUPROFEN 600 MG/1
600 TABLET ORAL ONCE
Status: COMPLETED | OUTPATIENT
Start: 2019-08-16 | End: 2019-08-16

## 2019-08-16 RX ADMIN — IBUPROFEN 600 MG: 600 TABLET, FILM COATED ORAL at 15:21

## 2019-08-16 ASSESSMENT — PAIN SCALES - GENERAL
PAINLEVEL_OUTOF10: 5
PAINLEVEL_OUTOF10: 5
PAINLEVEL_OUTOF10: 4

## 2019-08-16 ASSESSMENT — PAIN DESCRIPTION - DESCRIPTORS: DESCRIPTORS: CONSTANT;THROBBING

## 2019-08-16 ASSESSMENT — PAIN DESCRIPTION - PAIN TYPE
TYPE: ACUTE PAIN
TYPE: ACUTE PAIN

## 2019-08-16 ASSESSMENT — PAIN DESCRIPTION - LOCATION
LOCATION: FOOT
LOCATION: ANKLE

## 2019-08-16 ASSESSMENT — PAIN DESCRIPTION - ORIENTATION
ORIENTATION: LEFT
ORIENTATION: RIGHT

## 2019-08-16 ASSESSMENT — PAIN DESCRIPTION - FREQUENCY: FREQUENCY: CONTINUOUS

## 2019-08-16 NOTE — ED PROVIDER NOTES
about 5 years ago. Her smoking use included cigarettes. She started smoking about 42 years ago. She has a 34.00 pack-year smoking history. She has never used smokeless tobacco. She reports that she drinks alcohol. She reports that she does not use drugs. PHYSICAL EXAM     INITIAL VITALS:  height is 5' 6\" (1.676 m) and weight is 62.1 kg (137 lb). Her oral temperature is 97.9 °F (36.6 °C). Her blood pressure is 114/74 and her pulse is 76. Her respiration is 20 and oxygen saturation is 96%. Patient is alert and oriented, in mild distress due to pain. HEENT is atraumatic. Pupils are PERRL at 4 mm with normal extraocular motion. Mucous membranes moist.  No facial swelling. Tooth in question appears to have been moderately good repair with no obvious abscess in the right lower molar area. Neck is supple with no lymphadenopathy. No JVD. No meningismus. Heart sounds regular rate and rhythm with no gallops, murmurs, or rubs. Lungs clear, no wheezes, rales or rhonchi. Musculoskeletal exam:  No pain in the cervical, thoracic, or lumbar spine. Subjective discomfort in the left heel without pain over either malleolus or in the midfoot. No swelling or bruising noted. Normal sensorimotor functioning in the foot. Normal distal speed his pulse. No pain in the leg, knee, or thigh. The remainder of the musculoskeletal exam is unremarkable. Normal distal pulses in all extremities. Skin: no rash or edema. Neurological exam reveals cranial nerves 2 through 12 grossly intact. Patient has equal  and normal deep tendon reflexes. Psychiatric:  Appropriate. Lymphatics.:  No lymphadenopathy.        DIFFERENTIAL DIAGNOSIS/ MDM:     Fracture, contusion, spine injury, dentalgia, abscess    DIAGNOSTIC RESULTS       RADIOLOGY:   I directly visualized the following  images and reviewed the radiologist interpretations:  XR CALCANEUS LEFT (MIN 2 VIEWS)   Preliminary Result   Nondisplaced calcaneal fracture. EMERGENCY DEPARTMENT COURSE:   Vitals:    Vitals:    08/16/19 1512   BP: 114/74   Pulse: 76   Resp: 20   Temp: 97.9 °F (36.6 °C)   TempSrc: Oral   SpO2: 96%   Weight: 62.1 kg (137 lb)   Height: 5' 6\" (1.676 m)     -------------------------  BP: 114/74, Temp: 97.9 °F (36.6 °C), Pulse: 76, Resp: 20      Re-evaluation Notes    The patient was provided a cam boot and crutches. She will be given antibiotics as well for her tooth. I've given her orthopedic referral.  The patient is discharged in good condition. Controlled Substance Monitoring:    Acute and Chronic Pain Monitoring:   No flowsheet data found. PROCEDURES:    The patient was placed in a cam boot by the nurse. The patient had good color, sensation, and motion of the toes after placement. FINAL IMPRESSION      1. Unspecified physeal fracture of right calcaneus, initial encounter for closed fracture    2. Dentalgia          DISPOSITION/PLAN   DISPOSITION        Condition on Disposition    good    PATIENT REFERRED TO:  Roque Davis MD  [place] [address]  [phone]    In 2 days      your dentist    In 2 days        DISCHARGE MEDICATIONS:  New Prescriptions    OXYCODONE-ACETAMINOPHEN (PERCOCET) 5-325 MG PER TABLET    Take 1 tablet by mouth every 6 hours as needed for Pain for up to 5 days. Intended supply: 5 days.  Take lowest dose possible to manage pain    PENICILLIN V POTASSIUM (VEETID) 500 MG TABLET    Take 1 tablet by mouth 4 times daily for 7 days       (Please note that portions of this note were completed with a voice recognition program.  Efforts were made to edit the dictations but occasionally words are mis-transcribed.)    Lee MD   Attending Emergency Physician       Sara Pride MD  08/16/19 [id number]

## 2019-08-21 ENCOUNTER — OFFICE VISIT (OUTPATIENT)
Dept: ORTHOPEDIC SURGERY | Age: 54
End: 2019-08-21
Payer: MEDICARE

## 2019-08-21 VITALS — HEIGHT: 66 IN | WEIGHT: 136.91 LBS | BODY MASS INDEX: 22 KG/M2

## 2019-08-21 DIAGNOSIS — S92.015A CLOSED NONDISPLACED FRACTURE OF BODY OF LEFT CALCANEUS, INITIAL ENCOUNTER: Primary | ICD-10-CM

## 2019-08-21 PROCEDURE — 3017F COLORECTAL CA SCREEN DOC REV: CPT | Performed by: ORTHOPAEDIC SURGERY

## 2019-08-21 PROCEDURE — 1036F TOBACCO NON-USER: CPT | Performed by: ORTHOPAEDIC SURGERY

## 2019-08-21 PROCEDURE — G8420 CALC BMI NORM PARAMETERS: HCPCS | Performed by: ORTHOPAEDIC SURGERY

## 2019-08-21 PROCEDURE — 99204 OFFICE O/P NEW MOD 45 MIN: CPT | Performed by: ORTHOPAEDIC SURGERY

## 2019-08-21 PROCEDURE — G8427 DOCREV CUR MEDS BY ELIG CLIN: HCPCS | Performed by: ORTHOPAEDIC SURGERY

## 2019-08-21 RX ORDER — ASPIRIN 325 MG
325 TABLET, DELAYED RELEASE (ENTERIC COATED) ORAL DAILY
Qty: 42 TABLET | Refills: 0 | Status: SHIPPED | OUTPATIENT
Start: 2019-08-21 | End: 2022-07-26

## 2019-08-21 ASSESSMENT — ENCOUNTER SYMPTOMS
ABDOMINAL PAIN: 0
SHORTNESS OF BREATH: 0
EYE PAIN: 0

## 2019-08-21 NOTE — PROGRESS NOTES
[facility]  [place] [address]  Dept: [phone]    Ambulatory Orthopedic Consult      CHIEF COMPLAINT:    Chief Complaint   Patient presents with    Foot Pain     Left calc fx       HISTORY OF PRESENT ILLNESS:      The patient is a 53 y.o. female who is being seen for consultation and evaluation of an injury that occurred on 8/16/2019, secondary to jumping off a platform into shallow water. The patient reports she felt immediate pain in her left heel. The pain is localized to the left plantar heel. Prior to being seen here today, the patient was seen at an ER and placed into a boot. The patient reports an associated swelling. The pain is worse with activity and better with rest. The pain has improved since initially sustaining the injury, and she is walking on her fracture today in a cam boot, but using a crutch for assist. The patient denies calf pain, chest pain, and shortness of breath. She denies any lumbar spine pain       REVIEW OF SYSTEMS:  Review of Systems   Constitutional: Negative for fever. HENT: Negative for tinnitus. Eyes: Negative for pain. Respiratory: Negative for shortness of breath. Cardiovascular: Negative for chest pain. Gastrointestinal: Negative for abdominal pain. Genitourinary: Negative for dysuria. Skin: Negative for rash. Neurological: Negative for headaches. Hematological: Does not bruise/bleed easily.      Musculoskeletal: See HPI for pertinent positives     Past Medical History:    Past Medical History:   Diagnosis Date    Vargas's esophagus     for 20 yrs on and off    Bipolar disorder ([address]     recently diagnosed and placed on meds    Chronic obstructive pulmonary disease (COPD) ([address]     Depression     On Meds    Dyspnea on exertion     Eating disorder     bulemia    Empyema ([address] 2/17/2014    left    Fatigue due to depression     GERD (gastroesophageal reflux disease)     H/O chronic respiratory failure     Moderate persistent asthma     Seasonal allergies     Smoking     Substance abuse ([facility]        Past Surgical History:    Past Surgical History:   Procedure Laterality Date    HYSTERECTOMY  1988    THORACOSCOPY  2/17/14    Lt. VATS w/complete decortication    UPPER GASTROINTESTINAL ENDOSCOPY  10/13/2014    GE junction biopsy       Current Medications:   Current Outpatient Medications   Medication Sig Dispense Refill    penicillin v potassium (VEETID) 500 MG tablet Take 1 tablet by mouth 4 times daily for 7 days 28 tablet 0    oxyCODONE-acetaminophen (PERCOCET) 5-325 MG per tablet Take 1 tablet by mouth every 6 hours as needed for Pain for up to 5 days. Intended supply: 5 days.  Take lowest dose possible to manage pain 20 tablet 0    omeprazole (PRILOSEC) 40 MG delayed release capsule take 2 capsules by mouth once daily 60 capsule 1    tiZANidine (ZANAFLEX) 4 MG tablet take 1 tablet by mouth three times a day 30 tablet 0    atorvastatin (LIPITOR) 20 MG tablet Take 1 tablet by mouth daily 30 tablet 3    senna-docusate (RA P COL-RITE) 8.6-50 MG per tablet take 2 tablets by mouth once daily if needed for congestion 60 tablet 3    fluticasone (FLONASE) 50 MCG/ACT nasal spray 2 sprays by Each Nare route daily 2 Sprays in each nostril 1 Bottle 0    Umeclidinium Bromide (INCRUSE ELLIPTA) 62.5 MCG/INH AEPB Inhale 1 puff into the lungs daily 1 each 11    RA ALLERGY RELIEF 10 MG tablet take 1 tablet by mouth once daily 30 tablet 0    tazarotene (AVAGE) 0.1 % cream apply topically every evening 30 g 11    polyethylene glycol (GLYCOLAX) powder take 17GM (DISSOLVED IN WATER) by mouth once daily (Patient taking differently: as needed take 17GM (DISSOLVED IN WATER) by mouth once daily) 510 g 3    EPINEPHrine (EPIPEN 2-CRUZ) 0.3 MG/0.3ML SOAJ injection Use as directed for allergic reaction 1 each 2    albuterol sulfate HFA (VENTOLIN HFA) 108 (90 Base) MCG/ACT COPD with chronic respiratory failure, history of smoking (quit in 2014), and history of cocaine addiction (quit in 2014). I had a long discussion today with the patient about the likely diagnosis and its natural history, physical exam and imaging findings, as well as treatment options in detail. We discussed rest/activity modification, swelling control, NSAIDs/Acetaminophen/topical anesthetics, orthotics/shoewear modification, bracing/immobilization, injections, and physical therapy. Surgically, we discussed possible ORIF, depending on the results of the CT scan. The patient wishes to proceed with the recommendations as above. Orders/referrals were placed as below at today's visit. She was placed into an Ace wrap in a cam boot. I ordered her a rolling walker and a rolling knee scooter. I also ordered physical therapy for the patient to hep reinforce/teach the relevant weight bearing precautions, to help allow for safe transfers and early mobilization, as well as effectively utilize DME. She will avoid NSAIDs. We also had a discussion about the risk of blood clot and thromboembolic events, including the fact that they can be fatal. The patient understands that there is an increased risk with surgery/immobilization. The patient denies the following risk factors:      [x]  Atrial Fibrillation      [x]  Tobacco use   [x]  Obesity     [x]  Hormones (OCPs/IUD/HRT)   [x]  H/o blood clot   [x]  Family h/o blood clot    []  Other:          We discussed prophylactic measures to reduce the risk of DVT/PE's, such as early ambulation/mobilization, as well as pharmacologic prophylaxis, including their risks/benefits (bruising/bleeding). The patient understands nothing will completely eliminate the risk of DVT/PE's, and that any prophylactic medication does not substitute for early mobilization.  I gave the patient a choice of both weaker and stronger options, but given her above risk profile, I have recommended

## 2019-08-22 ENCOUNTER — HOSPITAL ENCOUNTER (OUTPATIENT)
Dept: CT IMAGING | Facility: CLINIC | Age: 54
Discharge: HOME | End: 2019-08-24
Payer: MEDICARE

## 2019-08-22 ENCOUNTER — HOSPITAL ENCOUNTER (OUTPATIENT)
Dept: PHYSICAL THERAPY | Facility: CLINIC | Age: 54
Setting detail: THERAPIES SERIES
Discharge: HOME | End: 2019-08-22
Payer: MEDICARE

## 2019-08-22 DIAGNOSIS — S92.015A CLOSED NONDISPLACED FRACTURE OF BODY OF LEFT CALCANEUS, INITIAL ENCOUNTER: ICD-10-CM

## 2019-08-22 PROCEDURE — 73700 CT LOWER EXTREMITY W/O DYE: CPT

## 2019-08-22 PROCEDURE — 97161 PT EVAL LOW COMPLEX 20 MIN: CPT

## 2019-08-22 NOTE — PRE-CERTIFICATION NOTE
Medicare Cap   [x] Physical Therapy  [] Speech Therapy  [] Occupational therapy  *PT and Speech caps combine      $2040 Cap limit < kx modifier needed        Patient Name: Slim John  YOB: 1965    Note:  This is an estimate of charges billed.      Date of [address] Name # units/ charge $$$ charge Daily Total Charge Ongoing Total $$$   8/22/19 PT eval low 1 81.73  81.73

## 2019-08-22 NOTE — CONSULTS
[] [address]  [address]    [phone]  F: [phone]   [] [address]  [facility]   [address]  P: [phone]  F: [phone]  [] [place]  [address] [facility]  P: [phone]  F: [phone]   [x] [address]  [facility] [address]   [place]: [phone]  F: [phone]  [] [facility]) - [facility]  [address] [facility] [address]  [place]: [phone]   F: [phone]         Physical Therapy Evaluation    Date:  2019  Patient: Vera Christensen  : 1965  MRN: [id number]  Physician: Dr. Assunta Prader: Medicare  Medical Diagnosis: Closed nondisplaced fx of body of L calcaneus  Rehab Codes: S92.015A  Onset date: 19   Next Dr's appt.:     Subjective:   CC/HPI: Pt arrives with axillary cruthces and CAM boot with partial wt bearing. Pt educated on NWB status. Tests: [x] X-Ray:    [] MRI:    [x] Other: Scheduled for CT scan to detremine surgical vs non surgical     Medications:  [x] Refer to full medical record [] None [] Other:  Allergies:       [x] Refer to full medical record [] None [] Other:        Martial Status    Home type [place] with [place] driveway   Stairs from outside 2 steps   Stairs inside 1 story    Employement Unemployed   Job status    Work Activities/duties     Recreational Activities        Pain present?  None currently    Location    Pain Rating currently    Pain at worse 5/10 with wt bearing   Pain at best    Description of pain    Altered Sensation    What makes it worse    What makes it better    Pain altered treatment/action    Symptom progression    Sleep              Objective:        Educated pt on use of DME, including: (Check box of device used)     [x] Knee

## 2019-08-23 DIAGNOSIS — R52 PAIN: ICD-10-CM

## 2019-08-23 DIAGNOSIS — S92.015A CLOSED NONDISPLACED FRACTURE OF BODY OF LEFT CALCANEUS, INITIAL ENCOUNTER: Primary | ICD-10-CM

## 2019-08-27 ENCOUNTER — OFFICE VISIT (OUTPATIENT)
Dept: PULMONOLOGY | Age: 54
End: 2019-08-27
Payer: MEDICARE

## 2019-08-27 ENCOUNTER — TELEPHONE (OUTPATIENT)
Dept: FAMILY MEDICINE CLINIC | Age: 54
End: 2019-08-27

## 2019-08-27 VITALS
HEIGHT: 67 IN | RESPIRATION RATE: 12 BRPM | WEIGHT: 140 LBS | BODY MASS INDEX: 21.97 KG/M2 | DIASTOLIC BLOOD PRESSURE: 74 MMHG | OXYGEN SATURATION: 99 % | HEART RATE: 76 BPM | SYSTOLIC BLOOD PRESSURE: 107 MMHG

## 2019-08-27 DIAGNOSIS — Z87.891 HISTORY OF SMOKING AT LEAST 1 PACK PER DAY FOR AT LEAST 30 YEARS: ICD-10-CM

## 2019-08-27 DIAGNOSIS — J44.9 CHRONIC OBSTRUCTIVE PULMONARY DISEASE, UNSPECIFIED COPD TYPE (HCC): Primary | ICD-10-CM

## 2019-08-27 DIAGNOSIS — R53.83 FATIGUE, UNSPECIFIED TYPE: ICD-10-CM

## 2019-08-27 DIAGNOSIS — F51.04 PSYCHOPHYSIOLOGICAL INSOMNIA: ICD-10-CM

## 2019-08-27 DIAGNOSIS — J45.30 MILD PERSISTENT ASTHMA WITHOUT COMPLICATION: ICD-10-CM

## 2019-08-27 DIAGNOSIS — G47.33 OBSTRUCTIVE SLEEP APNEA: ICD-10-CM

## 2019-08-27 PROCEDURE — G8926 SPIRO NO PERF OR DOC: HCPCS | Performed by: INTERNAL MEDICINE

## 2019-08-27 PROCEDURE — 3017F COLORECTAL CA SCREEN DOC REV: CPT | Performed by: INTERNAL MEDICINE

## 2019-08-27 PROCEDURE — G8427 DOCREV CUR MEDS BY ELIG CLIN: HCPCS | Performed by: INTERNAL MEDICINE

## 2019-08-27 PROCEDURE — 1036F TOBACCO NON-USER: CPT | Performed by: INTERNAL MEDICINE

## 2019-08-27 PROCEDURE — 99214 OFFICE O/P EST MOD 30 MIN: CPT | Performed by: INTERNAL MEDICINE

## 2019-08-27 PROCEDURE — G8420 CALC BMI NORM PARAMETERS: HCPCS | Performed by: INTERNAL MEDICINE

## 2019-08-27 PROCEDURE — 3023F SPIROM DOC REV: CPT | Performed by: INTERNAL MEDICINE

## 2019-08-27 NOTE — PROGRESS NOTES
PULMONARY OP  PROGRESS NOTE      Patient:  Gabby Garcia  YOB: 1965    MRN: [id number]     Acct:        Pt seen and Chart reviewed. Mr/Ms Gabby Garcia is here in followup for    Diagnosis   1. Chronic obstructive pulmonary disease, unspecified COPD type ([address]    2. Mild persistent asthma without complication    3. Fatigue, unspecified type    4. Psychophysiological insomnia      She is here for follow-up of her asthmatic bronchitis/COPD. Since she was seen last time she denies much symptoms or change in her symptoms she had no exacerbation no ER visit no steroid use or antibiotic in the last 6 months. She was taking Qvar which was changed to Arnuity because of formulary change by insurance, she is taking Incruse once daily. She is not requiring albuterol except occasionally when it is hot and humid. She denies cough wheezing, chest tightness. Denies waking up at night with cough shortness of breath or wheezing. She is able to do her regular activities without any problem. She occasionally have the feeling of air caught in lungs for a few minutes otherwise no shortness of breath. She claimed that she can walk 1-2 blocks without any problem she can go up and down 2 flights. Positive GERD on Prilosec and symptoms are controlled. She still complain of daytime sleepiness and tiredness, she does take nap for 15 to 20 minutes, she has frequent awakening at night, sometimes headache in morning, sleep is not refreshing, she wake up at night every few hours, she goes to bed around 10:00 and wake up until 6:30 in the morning, she denies waking up at night with gasping or choking she does not know if she has snoring as she lives alone, she doze off some time during the daytime watching TV. She has difficulty sleeping at night and take more than 30 minutes to fall asleep after she goes to bed.     Previous history and workup  Her last pulmonary function test showed airway reversibility and normal lung volume and normal diffusion capacity and she most likely have more asthmatic bronchitis than fixed obstruction. H/O pleural empyema and decortication in 2014, h/o asthma / COPD  H/O RML lung nodule and follow up CT did not show nodule and did not require further Ct scan  H/O smoking 37 years 1 PPD and stopped 5 years ago      Subjective:   Review of Systems -  General ROS: Positive for fatigue negative for weight loss fever night sweats  ENT ROS: negative for - epistaxis, headaches, nasal congestion, nasal polyps, tinnitus, vertigo or visual changes  Allergy and Immunology ROS: negative for - nasal congestion or postnasal drip  Respiratory ROS: Positive for dyspnea on exertion only and negative for - cough, hemoptysis, orthopnea, shortness of breath, sputum changes, tachypnea or wheezing  Cardiovascular ROS:  - Positive for intermittent dyspnea on exertion, negative for edema, irregular heartbeat, loss of consciousness, murmur, orthopnea, paroxysmal nocturnal dyspnea or rapid heart rate  Gastrointestinal ROS: no abdominal pain, change in bowel habits, or black or bloody stools  Musculoskeletal ROS: negative for - gait disturbance, joint pain or joint stiffness  CNS: negative for dizziness diplopia weakness tingling numbness syncope  Skin and lymphatics: negative for skin rash and skin lesion  Hem/onc: negative for petechia, easy bleeding and bruising    No flowsheet data found. Allergies:   Allergies   Allergen Reactions    Sesame [Oleum Sesami] Anaphylaxis     Sesame seeds    Codeine Nausea And Vomiting       Medications:  Outpatient Encounter Medications as of 8/27/2019   Medication Sig Dispense Refill    aspirin 325 MG EC tablet Take 1 tablet by mouth daily 42 tablet 0    omeprazole (PRILOSEC) 40 MG delayed release capsule take 2 capsules by mouth once daily 60 capsule 1    tiZANidine (ZANAFLEX) 4 MG tablet take 1 tablet by mouth three times a day reactive, extraocular eye movements intact  Ears - right ear normal, left ear normal  Nose - normal and patent, no erythema, discharge or polyps  Mouth - mucous membranes moist, pharynx normal without lesions  Neck - supple, no significant adenopathy  Chest - clear to auscultation, no wheezes, rales or rhonchi, symmetric air entry  Heart - normal rate, regular rhythm, normal S1, S2, no murmurs, rubs, clicks or gallops  Abdomen - soft, nontender, nondistended, no masses or organomegaly  Neurological - alert, oriented, normal speech, no focal findings or movement disorder noted  Extremities - peripheral pulses normal, no pedal edema, no clubbing or cyanosis  Skin - normal coloration and turgor, no rashes, no suspicious skin lesions noted     Labs:  None    CBC:   WBC   Date Value Ref Range Status   12/11/2017 17.4 (H) 3.5 - 11.3 k/uL Final     Hemoglobin   Date Value Ref Range Status   12/11/2017 13.7 11.9 - 15.1 g/dL Final     Platelets   Date Value Ref Range Status   12/11/2017 362 138 - 453 k/uL Final     BMP:   Sodium   Date Value Ref Range Status   12/11/2017 138 135 - 144 mmol/L Final     Potassium   Date Value Ref Range Status   06/19/2018 4.1 3.7 - 5.3 mmol/L Final     Chloride   Date Value Ref Range Status   12/11/2017 101 98 - 107 mmol/L Final     CO2   Date Value Ref Range Status   12/11/2017 21 20 - 31 mmol/L Final     BUN   Date Value Ref Range Status   12/11/2017 15 6 - 20 mg/dL Final     CREATININE   Date Value Ref Range Status   12/11/2017 0.56 0.50 - 0.90 mg/dL Final   03/14/2016 0.62 0.50 - 0.90 mg/dL Final   03/10/2014 0.56 0.4 - 1.0 mg/dL Final     Glucose   Date Value Ref Range Status   12/11/2017 155 (H) 70 - 99 mg/dL Final     S. Calcium:   Calcium   Date Value Ref Range Status   12/11/2017 8.4 (L) 8.6 - 10.4 mg/dL Final     S.  Ionized Calcium: No results found for: IONCA  S. Magnesium: No results found for: MG  S. Phosphorus: No results found for: PHOS  S. Glucose:   POC Glucose   Date Value

## 2019-08-28 ENCOUNTER — TELEPHONE (OUTPATIENT)
Dept: INFECTIOUS DISEASES | Age: 54
End: 2019-08-28

## 2019-08-28 ENCOUNTER — OFFICE VISIT (OUTPATIENT)
Dept: ORTHOPEDIC SURGERY | Age: 54
End: 2019-08-28
Payer: MEDICARE

## 2019-08-28 VITALS
DIASTOLIC BLOOD PRESSURE: 75 MMHG | WEIGHT: 139.99 LBS | BODY MASS INDEX: 21.97 KG/M2 | HEIGHT: 67 IN | HEART RATE: 78 BPM | SYSTOLIC BLOOD PRESSURE: 118 MMHG

## 2019-08-28 DIAGNOSIS — S92.015D CLOSED NONDISPLACED FRACTURE OF BODY OF LEFT CALCANEUS WITH ROUTINE HEALING, SUBSEQUENT ENCOUNTER: Primary | ICD-10-CM

## 2019-08-28 DIAGNOSIS — S92.524D CLOSED NONDISPLACED FRACTURE OF MIDDLE PHALANX OF LESSER TOE OF RIGHT FOOT WITH ROUTINE HEALING, SUBSEQUENT ENCOUNTER: ICD-10-CM

## 2019-08-28 DIAGNOSIS — M20.11 HALLUX VALGUS OF RIGHT FOOT: ICD-10-CM

## 2019-08-28 PROCEDURE — 99213 OFFICE O/P EST LOW 20 MIN: CPT | Performed by: ORTHOPAEDIC SURGERY

## 2019-08-28 PROCEDURE — G8427 DOCREV CUR MEDS BY ELIG CLIN: HCPCS | Performed by: ORTHOPAEDIC SURGERY

## 2019-08-28 PROCEDURE — 3017F COLORECTAL CA SCREEN DOC REV: CPT | Performed by: ORTHOPAEDIC SURGERY

## 2019-08-28 PROCEDURE — G8420 CALC BMI NORM PARAMETERS: HCPCS | Performed by: ORTHOPAEDIC SURGERY

## 2019-08-28 PROCEDURE — 1036F TOBACCO NON-USER: CPT | Performed by: ORTHOPAEDIC SURGERY

## 2019-08-28 ASSESSMENT — ENCOUNTER SYMPTOMS
SHORTNESS OF BREATH: 0
ABDOMINAL PAIN: 0
EYE PAIN: 0

## 2019-08-28 NOTE — PROGRESS NOTES
on and off    Bipolar disorder [facility])     recently diagnosed and placed on meds    Chronic obstructive pulmonary disease ([facility]D) [facility]     Depression     On Meds    Dyspnea on exertion     Eating disorder     bulemia    [facility] 2/17/2014    left    Fatigue due to depression     GERD (gastroesophageal reflux disease)     H/O chronic respiratory failure     Moderate persistent asthma     Seasonal allergies     Smoking     Substance abuse ([facility]        Past Surgical History:    Past Surgical History:   Procedure Laterality Date    HYSTERECTOMY  1988    THORACOSCOPY  2/17/14    Lt.  VATS w/complete decortication    UPPER GASTROINTESTINAL ENDOSCOPY  10/13/2014    GE junction biopsy       Current Medications:   Current Outpatient Medications   Medication Sig Dispense Refill    aspirin 325 MG EC tablet Take 1 tablet by mouth daily 42 tablet 0    omeprazole (PRILOSEC) 40 MG delayed release capsule take 2 capsules by mouth once daily 60 capsule 1    tiZANidine (ZANAFLEX) 4 MG tablet take 1 tablet by mouth three times a day 30 tablet 0    atorvastatin (LIPITOR) 20 MG tablet Take 1 tablet by mouth daily 30 tablet 3    senna-docusate (RA P COL-RITE) 8.6-50 MG per tablet take 2 tablets by mouth once daily if needed for congestion 60 tablet 3    fluticasone (FLONASE) 50 MCG/ACT nasal spray 2 sprays by Each Nare route daily 2 Sprays in each nostril 1 Bottle 0    Umeclidinium Bromide (INCRUSE ELLIPTA) 62.5 MCG/INH AEPB Inhale 1 puff into the lungs daily 1 each 11    RA ALLERGY RELIEF 10 MG tablet take 1 tablet by mouth once daily 30 tablet 0    tazarotene (AVAGE) 0.1 % cream apply topically every evening 30 g 11    polyethylene glycol (GLYCOLAX) powder take 17GM (DISSOLVED IN WATER) by mouth once daily (Patient taking differently: as needed take 17GM (DISSOLVED IN WATER) by mouth once daily) 510 g 3    EPINEPHrine (EPIPEN 2-CRUZ) 0.3 MG/0.3ML SOAJ injection Use as directed for allergic reaction 1 each 2 were answered and the patient agrees with the above plan. The patient will return to clinic in 4 weeks with right foot films and left calcaneus films. No follow-ups on file. No orders of the defined types were placed in this encounter. No orders of the defined types were placed in this encounter. Radha Gee MD  Orthopedic Surgery, Foot and Ankle        Please excuse any typos/errors, as this note was created with the assistance of voice recognition software. While intending to generate a document that actually reflects the content of the visit, the document can still have some errors including those of syntax and sound-a-like substitutions which may escape proof reading. In such instances, actual meaning can be extrapolated by context.

## 2019-08-29 DIAGNOSIS — M20.11 HALLUX VALGUS OF RIGHT FOOT: ICD-10-CM

## 2019-08-29 DIAGNOSIS — S92.015D CLOSED NONDISPLACED FRACTURE OF BODY OF LEFT CALCANEUS WITH ROUTINE HEALING, SUBSEQUENT ENCOUNTER: ICD-10-CM

## 2019-08-29 DIAGNOSIS — S92.015A CLOSED NONDISPLACED FRACTURE OF BODY OF LEFT CALCANEUS, INITIAL ENCOUNTER: ICD-10-CM

## 2019-08-29 DIAGNOSIS — S92.524D CLOSED NONDISPLACED FRACTURE OF MIDDLE PHALANX OF LESSER TOE OF RIGHT FOOT WITH ROUTINE HEALING, SUBSEQUENT ENCOUNTER: ICD-10-CM

## 2019-08-29 DIAGNOSIS — R52 PAIN: Primary | ICD-10-CM

## 2019-09-04 ENCOUNTER — TELEPHONE (OUTPATIENT)
Dept: ONCOLOGY | Age: 54
End: 2019-09-04

## 2019-09-10 ENCOUNTER — HOSPITAL ENCOUNTER (OUTPATIENT)
Dept: CT IMAGING | Facility: CLINIC | Age: 54
Discharge: HOME | End: 2019-09-12
Payer: MEDICARE

## 2019-09-10 DIAGNOSIS — Z87.891 HISTORY OF SMOKING AT LEAST 1 PACK PER DAY FOR AT LEAST 30 YEARS: ICD-10-CM

## 2019-09-10 PROCEDURE — G0297 LDCT FOR LUNG CA SCREEN: HCPCS

## 2019-09-25 ENCOUNTER — OFFICE VISIT (OUTPATIENT)
Dept: ORTHOPEDIC SURGERY | Age: 54
End: 2019-09-25
Payer: MEDICARE

## 2019-09-25 VITALS — WEIGHT: 139.99 LBS | BODY MASS INDEX: 21.97 KG/M2 | HEIGHT: 67 IN

## 2019-09-25 DIAGNOSIS — S92.015D CLOSED NONDISPLACED FRACTURE OF BODY OF LEFT CALCANEUS WITH ROUTINE HEALING, SUBSEQUENT ENCOUNTER: Primary | ICD-10-CM

## 2019-09-25 DIAGNOSIS — S92.524D CLOSED NONDISPLACED FRACTURE OF MIDDLE PHALANX OF LESSER TOE OF RIGHT FOOT WITH ROUTINE HEALING, SUBSEQUENT ENCOUNTER: ICD-10-CM

## 2019-09-25 PROCEDURE — G8420 CALC BMI NORM PARAMETERS: HCPCS | Performed by: ORTHOPAEDIC SURGERY

## 2019-09-25 PROCEDURE — G8427 DOCREV CUR MEDS BY ELIG CLIN: HCPCS | Performed by: ORTHOPAEDIC SURGERY

## 2019-09-25 PROCEDURE — 1036F TOBACCO NON-USER: CPT | Performed by: ORTHOPAEDIC SURGERY

## 2019-09-25 PROCEDURE — 99213 OFFICE O/P EST LOW 20 MIN: CPT | Performed by: ORTHOPAEDIC SURGERY

## 2019-09-25 PROCEDURE — 3017F COLORECTAL CA SCREEN DOC REV: CPT | Performed by: ORTHOPAEDIC SURGERY

## 2019-09-25 ASSESSMENT — ENCOUNTER SYMPTOMS
SHORTNESS OF BREATH: 0
EYE PAIN: 0
ABDOMINAL PAIN: 0

## 2019-09-25 NOTE — PROGRESS NOTES
Umeclidinium Bromide (INCRUSE ELLIPTA) 62.5 MCG/INH AEPB Inhale 1 puff into the lungs daily 1 each 11    RA ALLERGY RELIEF 10 MG tablet take 1 tablet by mouth once daily 30 tablet 0    tazarotene (AVAGE) 0.1 % cream apply topically every evening 30 g 11    polyethylene glycol (GLYCOLAX) powder take 17GM (DISSOLVED IN WATER) by mouth once daily (Patient taking differently: as needed take 17GM (DISSOLVED IN WATER) by mouth once daily) 510 g 3    EPINEPHrine (EPIPEN 2-CRUZ) 0.3 MG/0.3ML SOAJ injection Use as directed for allergic reaction 1 each 2    albuterol sulfate HFA (VENTOLIN HFA) 108 (90 Base) MCG/ACT inhaler Inhale 2 puffs into the lungs every 6 hours as needed for Wheezing 1 Inhaler 3    Cholecalciferol (VITAMIN D3) 5000 units TABS Take 1 tablet by mouth daily      ferrous sulfate 325 (65 FE) MG tablet take 1 tablet by mouth once daily WITH BREAKFAST 30 tablet 3    TRINTELLIX 20 MG TABS tablet take 1 tablet by mouth once daily  0     No current facility-administered medications for this visit.         Allergies:    Sesame [oleum sesami] and Codeine    Family History:  Family History   Problem Relation Age of Onset   [facility] Asthma Mother     Stroke Father     Heart Attack Father     Cancer Maternal Grandmother         skin cancer    Diabetes Maternal Uncle        Social History:   Social History     Socioeconomic History    Marital status: Single     Spouse name: Not on file    Number of children: 1    Years of education: Not on file    Highest education level: Not on file   Occupational History    Occupation: Disabled   Social Needs    Financial resource strain: Not on file    Food insecurity:     Worry: Not on file     Inability: Not on file   Ex[employer] needs:     Medical: Not on file     Non-medical: Not on file   Tobacco Use    Smoking status: Former Smoker     Packs/day: 1.00     Years: 34.00     Pack years: 34.00     Types: Cigarettes     Start date: 6/30/1977     Last attempt to quit: diffusely --minimal    MUSCULOSKELETAL (right lower extremity):  Vascular: Toes warm and well perfused, compartments soft/compressible, mild swelling of third toe. Skin:  Intact over fracture site, without rash/lesions/AV malformations. Motion: Able to flex/extend all toes  -Range of motion not tested due to pain  -Tenderness to palpation at third toe      RADIOLOGY:   9/25/2019 FINDINGS:  Three weightbearing views (AP, Oblique, and Lateral) of the right foot and 2 weightbearing views (lateral and axial) of the left calcaneus were obtained in the office today and reviewed, revealing left nondisplaced intra-articular calcaneal fracture with interval healing. Right hallux valgus. Nondisplaced fracture of the middle phalanx of the right third toe with interval healing but mild displacement. IMPRESSION:  Nondisplaced left calcaneus fracture. Right hallux valgus. Nondisplaced fracture of the middle phalanx of the right third toe. Electronically signed by Riley Street MD      CT scan from 8/22/2019 reviewed, both images and report as below:  Acute comminuted nondisplaced extra-articular fracture of the calcaneus with   a minimally displaced 1.8 cm butterfly fragment.       No additional fractures. Xr Calcaneus Left (min 2 Views)  Result Date: 8/16/2019  Nondisplaced calcaneal fracture. ASSESSMENT AND PLAN:  Evie Judge was seen today for Foot Pain (Left Heel)  The primary encounter diagnosis was Closed nondisplaced fracture of body of left calcaneus with routine healing, subsequent encounter. A diagnosis of Closed nondisplaced fracture of middle phalanx of lesser toe of right foot with routine healing, subsequent encounter was also pertinent to this visit. Body mass index is 21.92 kg/m². She has a left intra-articular nondisplaced calcaneus fracture and right third toe middle phalanx nondisplaced fracture, sustained on 8/16/2019; .  Notably, she has a complex past medical history including high cholesterol, Vargas's esophagus, GERD, COPD with chronic respiratory failure, history of smoking (quit in 2014), and history of cocaine addiction (quit in 2014). She does have a history of noncompliance with her current treatment course, but she is doing well. I had a long discussion today with the patient about the likely diagnosis and its natural history, physical exam and imaging findings, as well as treatment options in detail. We discussed rest/activity modification, swelling control, NSAIDs/Acetaminophen/topical anesthetics, orthotics/shoewear modification, bracing/immobilization, injections, and physical therapy. The patient wishes to proceed with the recommendations as above. Orders/referrals were placed as below at today's visit. She will continue using her cam boot for her left calcaneus fracture, and she will continue weightbearing as tolerated. She did refuse physical therapy, stating that she feels she does not need it at this time. I order compression socks to use as needed for swelling. She will avoid NSAIDs. She will take daily aspirin for DVT prophylaxis as long as she is in the cam boot. All questions were answered and the patient agrees with the above plan. The patient will return to clinic in 6 weeks with repeat left foot x-rays. Return in about 6 weeks (around 11/6/2019). No orders of the defined types were placed in this encounter. Orders Placed This Encounter   Procedures    DME Order for (Specify) as OP     DME: compression stockings (8-15 mm Hg)  Routine, External, Referral By - Suyapa Jernigan, Latricey-1, Supply Name: Compression Stockings Prognosis: good Estimated length of need: 99         Zackery Parisi MD  Orthopedic Surgery, Foot and Ankle        Please excuse any typos/errors, as this note was created with the assistance of voice recognition software.  While intending to generate a document that actually reflects the content of the

## 2019-09-26 DIAGNOSIS — S92.015D CLOSED NONDISPLACED FRACTURE OF BODY OF LEFT CALCANEUS WITH ROUTINE HEALING, SUBSEQUENT ENCOUNTER: Primary | ICD-10-CM

## 2019-10-13 DIAGNOSIS — K59.00 CONSTIPATION, UNSPECIFIED CONSTIPATION TYPE: ICD-10-CM

## 2019-10-13 DIAGNOSIS — Z76.0 MEDICATION REFILL: ICD-10-CM

## 2019-10-14 RX ORDER — AMOXICILLIN 250 MG
CAPSULE ORAL
Qty: 60 TABLET | Refills: 1 | Status: SHIPPED | OUTPATIENT
Start: 2019-10-14 | End: 2019-12-13 | Stop reason: SDUPTHER

## 2019-10-29 ENCOUNTER — HOSPITAL ENCOUNTER (EMERGENCY)
Facility: CLINIC | Age: 54
Discharge: HOME | End: 2019-10-29
Attending: SPECIALIST
Payer: MEDICARE

## 2019-10-29 VITALS
DIASTOLIC BLOOD PRESSURE: 87 MMHG | HEIGHT: 67 IN | OXYGEN SATURATION: 98 % | BODY MASS INDEX: 21.82 KG/M2 | RESPIRATION RATE: 16 BRPM | HEART RATE: 78 BPM | WEIGHT: 139 LBS | TEMPERATURE: 98.8 F | SYSTOLIC BLOOD PRESSURE: 118 MMHG

## 2019-10-29 DIAGNOSIS — B37.0 ORAL THRUSH: Primary | ICD-10-CM

## 2019-10-29 PROCEDURE — 99282 EMERGENCY DEPT VISIT SF MDM: CPT

## 2019-10-29 ASSESSMENT — PAIN SCALES - GENERAL: PAINLEVEL_OUTOF10: 3

## 2019-10-29 ASSESSMENT — PAIN DESCRIPTION - DESCRIPTORS: DESCRIPTORS: BURNING

## 2019-10-29 ASSESSMENT — PAIN DESCRIPTION - PROGRESSION: CLINICAL_PROGRESSION: GRADUALLY WORSENING

## 2019-10-29 ASSESSMENT — ENCOUNTER SYMPTOMS
SORE THROAT: 0
COUGH: 0
SHORTNESS OF BREATH: 0
WHEEZING: 0

## 2019-10-29 ASSESSMENT — PAIN DESCRIPTION - ONSET: ONSET: SUDDEN

## 2019-10-29 ASSESSMENT — PAIN DESCRIPTION - LOCATION: LOCATION: OTHER (COMMENT)

## 2019-10-29 ASSESSMENT — PAIN DESCRIPTION - PAIN TYPE: TYPE: ACUTE PAIN

## 2019-10-29 ASSESSMENT — PAIN DESCRIPTION - FREQUENCY: FREQUENCY: CONTINUOUS

## 2019-11-04 ENCOUNTER — HOSPITAL ENCOUNTER (OUTPATIENT)
Dept: SLEEP CENTER | Age: 54
Discharge: HOME | End: 2019-11-06
Payer: MEDICARE

## 2019-11-04 VITALS — WEIGHT: 140 LBS | BODY MASS INDEX: 21.97 KG/M2 | HEIGHT: 67 IN

## 2019-11-04 PROCEDURE — 95810 POLYSOM 6/> YRS 4/> PARAM: CPT

## 2019-11-06 ENCOUNTER — OFFICE VISIT (OUTPATIENT)
Dept: ORTHOPEDIC SURGERY | Age: 54
End: 2019-11-06
Payer: MEDICARE

## 2019-11-06 VITALS
HEIGHT: 67 IN | SYSTOLIC BLOOD PRESSURE: 112 MMHG | HEART RATE: 68 BPM | BODY MASS INDEX: 21.93 KG/M2 | DIASTOLIC BLOOD PRESSURE: 80 MMHG

## 2019-11-06 DIAGNOSIS — S92.015D CLOSED NONDISPLACED FRACTURE OF BODY OF LEFT CALCANEUS WITH ROUTINE HEALING, SUBSEQUENT ENCOUNTER: Primary | ICD-10-CM

## 2019-11-06 DIAGNOSIS — S92.524D CLOSED NONDISPLACED FRACTURE OF MIDDLE PHALANX OF LESSER TOE OF RIGHT FOOT WITH ROUTINE HEALING, SUBSEQUENT ENCOUNTER: ICD-10-CM

## 2019-11-06 PROCEDURE — 1036F TOBACCO NON-USER: CPT | Performed by: ORTHOPAEDIC SURGERY

## 2019-11-06 PROCEDURE — G8420 CALC BMI NORM PARAMETERS: HCPCS | Performed by: ORTHOPAEDIC SURGERY

## 2019-11-06 PROCEDURE — 99213 OFFICE O/P EST LOW 20 MIN: CPT | Performed by: ORTHOPAEDIC SURGERY

## 2019-11-06 PROCEDURE — G8427 DOCREV CUR MEDS BY ELIG CLIN: HCPCS | Performed by: ORTHOPAEDIC SURGERY

## 2019-11-06 PROCEDURE — 3017F COLORECTAL CA SCREEN DOC REV: CPT | Performed by: ORTHOPAEDIC SURGERY

## 2019-11-06 PROCEDURE — G8484 FLU IMMUNIZE NO ADMIN: HCPCS | Performed by: ORTHOPAEDIC SURGERY

## 2019-11-06 ASSESSMENT — ENCOUNTER SYMPTOMS
ABDOMINAL PAIN: 0
SHORTNESS OF BREATH: 0
EYE PAIN: 0

## 2019-11-22 LAB — STATUS: NORMAL

## 2019-12-13 DIAGNOSIS — K59.00 CONSTIPATION, UNSPECIFIED CONSTIPATION TYPE: ICD-10-CM

## 2019-12-13 DIAGNOSIS — Z76.0 MEDICATION REFILL: ICD-10-CM

## 2019-12-13 RX ORDER — TAZAROTENE 1 MG/G
CREAM TOPICAL
Qty: 30 G | Refills: 0 | Status: SHIPPED | OUTPATIENT
Start: 2019-12-13 | End: 2020-01-28 | Stop reason: SDUPTHER

## 2019-12-17 RX ORDER — AMOXICILLIN 250 MG
CAPSULE ORAL
Qty: 60 TABLET | Refills: 0 | Status: SHIPPED | OUTPATIENT
Start: 2019-12-17 | End: 2020-01-14

## 2020-01-03 ENCOUNTER — OFFICE VISIT (OUTPATIENT)
Dept: FAMILY MEDICINE CLINIC | Age: 55
End: 2020-01-03
Payer: MEDICARE

## 2020-01-03 VITALS
HEART RATE: 66 BPM | HEIGHT: 67 IN | OXYGEN SATURATION: 98 % | TEMPERATURE: 98.7 F | SYSTOLIC BLOOD PRESSURE: 100 MMHG | WEIGHT: 140 LBS | BODY MASS INDEX: 21.97 KG/M2 | DIASTOLIC BLOOD PRESSURE: 74 MMHG | RESPIRATION RATE: 18 BRPM

## 2020-01-03 PROCEDURE — 90686 IIV4 VACC NO PRSV 0.5 ML IM: CPT | Performed by: FAMILY MEDICINE

## 2020-01-03 PROCEDURE — G8482 FLU IMMUNIZE ORDER/ADMIN: HCPCS | Performed by: FAMILY MEDICINE

## 2020-01-03 PROCEDURE — 3017F COLORECTAL CA SCREEN DOC REV: CPT | Performed by: FAMILY MEDICINE

## 2020-01-03 PROCEDURE — G0439 PPPS, SUBSEQ VISIT: HCPCS | Performed by: FAMILY MEDICINE

## 2020-01-03 ASSESSMENT — LIFESTYLE VARIABLES: HOW OFTEN DO YOU HAVE A DRINK CONTAINING ALCOHOL: 0

## 2020-01-03 ASSESSMENT — PATIENT HEALTH QUESTIONNAIRE - PHQ9
SUM OF ALL RESPONSES TO PHQ QUESTIONS 1-9: 0
SUM OF ALL RESPONSES TO PHQ QUESTIONS 1-9: 0

## 2020-01-03 NOTE — PATIENT INSTRUCTIONS
infection in the lungs and breathing passages. It is caused by the influenza virus. There are different strains, or types, of the flu virus every year. The flu comes on quickly. It can cause a cough, stuffy nose, fever, chills, tiredness, and aches and pains. These symptoms may last up to 10 days. The flu can make you feel very sick, but most of the time it doesn't lead to other problems. But it can cause serious problems in people who are older or who have a long-term illness, such as heart disease or diabetes. You can help prevent the flu by getting a flu vaccine every year, as soon as it is available. You cannot get the flu from the vaccine. The vaccine prevents most cases of the flu. But even when the vaccine doesn't prevent the flu, it can make symptoms less severe and reduce the chance of problems from the flu. Sometimes, young children and people who have an immune system problem may have a slight fever or muscle aches or pains 6 to 12 hours after getting the shot. These symptoms usually last 1 or 2 days. Follow-up care is a key part of your treatment and safety. Be sure to make and go to all appointments, and call your doctor if you are having problems. It's also a good idea to know your test results and keep a list of the medicines you take. Who should get the flu vaccine? Everyone age 7 months or older should get a flu vaccine each year. It lowers the chance of getting and spreading the flu. The vaccine is very important for people who are at high risk for getting other health problems from the flu. This includes:  Anyone 48years of age or older. People who live in a long-term care center, such as a nursing home. All children 6 months through 25years of age. Adults and children 6 months and older who have long-term heart or lung problems, such as asthma.   Adults and children 6 months and older who needed medical care or were in a hospital during the past year because of diabetes, chronic kidney disease, or a weak immune system (including HIV or AIDS). Women who will be pregnant during the flu season. People who have any condition that can make it hard to breathe or swallow (such as a brain injury or muscle disorders). People who can give the flu to others who are at high risk for problems from the flu. This includes all health care workers and close contacts of people age 72 or older. Who should not get the flu vaccine? The person who gives the vaccine may tell you not to get it if you:  Have a severe allergy to eggs or any part of the vaccine. Have had a severe reaction to a flu vaccine in the past.  Have had Guillain-Barré syndrome (GBS). Are sick with a fever. (Get the vaccine when symptoms are gone.)  How can you care for yourself at home? If you or your child has a sore arm or a slight fever after the shot, take an over-the-counter pain medicine, such as acetaminophen (Tylenol) or ibuprofen (Advil, Motrin). Read and follow all instructions on the label. Do not give aspirin to anyone younger than 20. It has been linked to Reye syndrome, a serious illness. Do not take two or more pain medicines at the same time unless the doctor told you to. Many pain medicines have acetaminophen, which is Tylenol. Too much acetaminophen (Tylenol) can be harmful. When should you call for help? Call 911 anytime you think you may need emergency care. For example, call if after getting the flu vaccine:    You have symptoms of a severe reaction to the flu vaccine. Symptoms of a severe reaction may include:  Severe difficulty breathing. Sudden raised, red areas (hives) all over your body. Severe lightheadedness.    Call your doctor now or seek immediate medical care if after getting the flu vaccine:    You think you are having a reaction to the flu vaccine, such as a new fever.    Watch closely for changes in your health, and be sure to contact your doctor if you have any problems. Where can you learn more?   Go

## 2020-01-03 NOTE — PROGRESS NOTES
Base) MCG/ACT inhaler Inhale 2 puffs into the lungs every 6 hours as needed for Wheezing Yes Nayana Villeda MD   Cholecalciferol (VITAMIN D3) 5000 units TABS Take 1 tablet by mouth daily Yes Historical Provider, MD   ferrous sulfate 325 (65 FE) MG tablet take 1 tablet by mouth once daily WITH BREAKFAST Yes Gogo Emery MD   TRINTELLIX 20 MG TABS tablet take 1 tablet by mouth once daily Yes Historical Provider, MD   aspirin 325 MG EC tablet Take 1 tablet by mouth daily  Brittaney Celis MD       Past Medical History:   Diagnosis Date    Vargas's esophagus     for 20 yrs on and off    B[facility] ([facility]     recently diagnosed and placed on meds    Chronic obstructive pulmonary disease (COPD) ([facility]     Depression     On Meds    Dyspnea on exertion     Eating disorder     bulemia    Empyema ([facility] 2/17/2014    left    Fatigue due to depression     GERD (gastroesophageal reflux disease)     H/O chronic respiratory failure     Moderate persistent asthma     Seasonal allergies     Smoking     Substance abuse ([facility]        Past Surgical History:   Procedure Laterality Date    HYSTERECTOMY  1988    THORACOSCOPY  2/17/14    Lt.  VATS w/complete decortication    UPPER GASTROINTESTINAL ENDOSCOPY  10/13/2014    GE junction biopsy       Family History   Problem Relation Age of Onset    Asthma Mother     Stroke Father     Heart Attack Father     Cancer Maternal Grandmother         skin cancer    Diabetes Maternal Uncle        CareTeam (Including outside providers/suppliers regularly involved in providing care):   Patient Care Team:  Milana Escalera MD as PCP - General (Family Medicine)  Janette Hernandez MD as Surgeon (Cardiothoracic Surgery)  Nayana Villeda MD as Consulting Physician (Pulmonology)  410 Katelyn Pulliam RN as Nurse Navigator    Wt Readings from Last 3 Encounters:   01/03/20 140 lb (63.5 kg)   11/04/19 140 lb (63.5 kg)   10/29/19 139 lb (63 kg)     Vitals:    01/03/20

## 2020-01-14 ENCOUNTER — OFFICE VISIT (OUTPATIENT)
Dept: PULMONOLOGY | Age: 55
End: 2020-01-14
Payer: MEDICARE

## 2020-01-14 VITALS
WEIGHT: 140.4 LBS | HEART RATE: 74 BPM | OXYGEN SATURATION: 99 % | HEIGHT: 67 IN | RESPIRATION RATE: 16 BRPM | DIASTOLIC BLOOD PRESSURE: 80 MMHG | BODY MASS INDEX: 22.03 KG/M2 | SYSTOLIC BLOOD PRESSURE: 108 MMHG

## 2020-01-14 PROCEDURE — G8420 CALC BMI NORM PARAMETERS: HCPCS | Performed by: INTERNAL MEDICINE

## 2020-01-14 PROCEDURE — 99214 OFFICE O/P EST MOD 30 MIN: CPT | Performed by: INTERNAL MEDICINE

## 2020-01-14 PROCEDURE — 3023F SPIROM DOC REV: CPT | Performed by: INTERNAL MEDICINE

## 2020-01-14 PROCEDURE — 3017F COLORECTAL CA SCREEN DOC REV: CPT | Performed by: INTERNAL MEDICINE

## 2020-01-14 PROCEDURE — G8427 DOCREV CUR MEDS BY ELIG CLIN: HCPCS | Performed by: INTERNAL MEDICINE

## 2020-01-14 PROCEDURE — G8482 FLU IMMUNIZE ORDER/ADMIN: HCPCS | Performed by: INTERNAL MEDICINE

## 2020-01-14 PROCEDURE — G8926 SPIRO NO PERF OR DOC: HCPCS | Performed by: INTERNAL MEDICINE

## 2020-01-14 PROCEDURE — 1036F TOBACCO NON-USER: CPT | Performed by: INTERNAL MEDICINE

## 2020-01-14 RX ORDER — AMOXICILLIN 250 MG
CAPSULE ORAL
Qty: 60 TABLET | Refills: 0 | Status: SHIPPED | OUTPATIENT
Start: 2020-01-14 | End: 2020-02-18 | Stop reason: SDUPTHER

## 2020-01-14 RX ORDER — ATORVASTATIN CALCIUM 20 MG/1
TABLET, FILM COATED ORAL
Qty: 30 TABLET | Refills: 3 | Status: SHIPPED | OUTPATIENT
Start: 2020-01-14 | End: 2020-02-18 | Stop reason: SDUPTHER

## 2020-01-14 NOTE — TELEPHONE ENCOUNTER
Next Visit Date:  Future Appointments   Date Time Provider Zane Schaefer   1/28/2020 10:15 AM Scout Wynn MD [id number]   7/7/2020 10:30 AM Joseph Watson MD Resp Spec [address] Maintenance   Topic Date Due    HIV screen  07/17/1979    Shingles Vaccine (1 of 2) 07/17/2014    Lipid screen  05/02/2018    Breast cancer screen  07/25/2019    Low dose CT lung screening  09/10/2020    Annual Wellness Visit (AWV)  01/02/2021    DTaP/Tdap/Td vaccine (2 - Td) 04/30/2024    Colon cancer screen colonoscopy  07/14/2026    Flu vaccine  Completed    Pneumococcal 0-64 years Vaccine  Completed    Hepatitis C screen  Completed       Hemoglobin A1C (%)   Date Value   04/19/2018 5.5   02/12/2014 5.7             ( goal A1C is < 7)   No results found for: LABMICR  LDL Cholesterol (mg/dL)   Date Value   05/02/2017 152 (H)   12/13/2011 141 (H)       (goal LDL is <100)   AST (U/L)   Date Value   02/05/2018 18     ALT (U/L)   Date Value   02/05/2018 15     BUN (mg/dL)   Date Value   12/11/2017 15     BP Readings from Last 3 Encounters:   01/14/20 108/80   01/03/20 100/74   11/06/19 112/80          (goal 120/80)    All Future Testing planned in [facility]  Lab Frequency Next Occurrence   Sleep Study with PAP Titration Once 09/10/2019   Baseline Diagnostic Sleep Study Once 09/10/2019               Patient Active Problem List:     Vargas esophagus     Acne     Dental caries     Smoking     Hypoxia     Empyema (HCC)     GERD (gastroesophageal reflux disease)     H/O chronic respiratory failure     COPD (chronic obstructive pulmonary disease) (HCC)     Orthostatic hypotension     Medication refill     Seasonal allergies     Acne comedone     Bunion     Viral URI     High cholesterol     Ear fullness, right

## 2020-01-14 NOTE — PROGRESS NOTES
denies waking up at night with gasping or choking, she denies doze off some time during the daytime or watching TV. She has difficulty sleeping at night and take 15 minutes to fall asleep after she goes to bed. Previous history and workup  Her last pulmonary function test showed airway reversibility and normal lung volume and normal diffusion capacity and she most likely have more asthmatic bronchitis than fixed obstruction. H/O pleural empyema and decortication in 2014, h/o asthma / COPD  H/O RML lung nodule and follow up CT did not show nodule and did not require further Ct scan  H/O smoking 37 years 1 PPD and stopped in 2014      Subjective:   Review of Systems -  General ROS: Positive for fatigue negative for weight loss fever night sweats  ENT ROS: negative for - epistaxis, headaches, nasal congestion, nasal polyps, tinnitus, vertigo or visual changes  Allergy and Immunology ROS: negative for - nasal congestion or postnasal drip  Respiratory ROS: Positive for dyspnea on exertion only and negative for - cough, hemoptysis, orthopnea, shortness of breath, sputum changes, tachypnea or wheezing  Cardiovascular ROS:  - Positive for intermittent dyspnea on exertion, negative for edema, irregular heartbeat, loss of consciousness, murmur, orthopnea, paroxysmal nocturnal dyspnea or rapid heart rate  Gastrointestinal ROS: no abdominal pain, change in bowel habits, or black or bloody stools  Musculoskeletal ROS: negative for - gait disturbance, joint pain or joint stiffness  CNS: negative for dizziness diplopia weakness tingling numbness syncope  Skin and lymphatics: negative for skin rash and skin lesion  Hem/onc: negative for petechia, easy bleeding and bruising    No flowsheet data found. Allergies:   Allergies   Allergen Reactions    Sesame [Oleum Sesami] Anaphylaxis     Sesame seeds    Codeine Nausea And Vomiting       Medications:  Outpatient Encounter Medications as of 1/14/2020   Medication Sig Dispense

## 2020-01-28 ENCOUNTER — OFFICE VISIT (OUTPATIENT)
Dept: DERMATOLOGY | Age: 55
End: 2020-01-28
Payer: MEDICARE

## 2020-01-28 VITALS
BODY MASS INDEX: 22.29 KG/M2 | DIASTOLIC BLOOD PRESSURE: 79 MMHG | HEART RATE: 79 BPM | HEIGHT: 67 IN | SYSTOLIC BLOOD PRESSURE: 116 MMHG | OXYGEN SATURATION: 96 % | WEIGHT: 142 LBS

## 2020-01-28 PROCEDURE — 99213 OFFICE O/P EST LOW 20 MIN: CPT | Performed by: DERMATOLOGY

## 2020-01-28 PROCEDURE — G8482 FLU IMMUNIZE ORDER/ADMIN: HCPCS | Performed by: DERMATOLOGY

## 2020-01-28 PROCEDURE — G8420 CALC BMI NORM PARAMETERS: HCPCS | Performed by: DERMATOLOGY

## 2020-01-28 PROCEDURE — 1036F TOBACCO NON-USER: CPT | Performed by: DERMATOLOGY

## 2020-01-28 PROCEDURE — G8428 CUR MEDS NOT DOCUMENT: HCPCS | Performed by: DERMATOLOGY

## 2020-01-28 PROCEDURE — 3017F COLORECTAL CA SCREEN DOC REV: CPT | Performed by: DERMATOLOGY

## 2020-01-28 RX ORDER — TAZAROTENE 1 MG/G
CREAM TOPICAL
Qty: 30 G | Refills: 11 | Status: SHIPPED | OUTPATIENT
Start: 2020-01-28 | End: 2020-12-29 | Stop reason: SDUPTHER

## 2020-01-28 NOTE — PROGRESS NOTES
by mouth once daily (Patient taking differently: as needed take 17GM (DISSOLVED IN WATER) by mouth once daily) 510 g 3    EPINEPHrine (EPIPEN 2-CRUZ) 0.3 MG/0.3ML SOAJ injection Use as directed for allergic reaction 1 each 2    albuterol sulfate HFA (VENTOLIN HFA) 108 (90 Base) MCG/ACT inhaler Inhale 2 puffs into the lungs every 6 hours as needed for Wheezing 1 Inhaler 3    Cholecalciferol (VITAMIN D3) 5000 units TABS Take 1 tablet by mouth daily      ferrous sulfate 325 (65 FE) MG tablet take 1 tablet by mouth once daily WITH BREAKFAST 30 tablet 3    TRINTELLIX 20 MG TABS tablet take 1 tablet by mouth once daily  0     No current facility-administered medications for this visit. ALLERGIES:   Allergies   Allergen Reactions    Sesame [Oleum Sesami] Anaphylaxis     Sesame seeds    Codeine Nausea And Vomiting       SOCIAL HISTORY:  Social History     Tobacco Use    Smoking status: Former Smoker     Packs/day: 1.00     Years: 34.00     Pack years: 34.00     Types: Cigarettes     Start date: 1977     Last attempt to quit: 2014     Years since quittin.9    Smokeless tobacco: Never Used   Substance Use Topics    Alcohol use: Yes     Comment: quit 38 days ago after 30 plus years of heavy abuse       REVIEW OF SYSTEMS:  Review of Systems   Constitutional: Negative. Skin:Denies any new changing, growing or bleeding lesions or rashes except as described in the HPI     PHYSICAL EXAM:   /79 (Site: Right Upper Arm, Position: Sitting, Cuff Size: Medium Adult)   Pulse 79   Ht 5' 7\" (1.702 m)   Wt 142 lb (64.4 kg)   SpO2 96%   BMI 22.24 kg/m²     General Exam:  General Appearance: No acute distress, Well nourished     Neuro: Alert and oriented to person, place and time  Psych: Normal affect   Lymph Node: Not performed    Cutaneous Exam: Performed as documented in clinic note below.   Acne exam, which includes the head/face, neck, chest, and back was performed    Pertinent Physical Exam

## 2020-02-14 ENCOUNTER — TELEPHONE (OUTPATIENT)
Dept: FAMILY MEDICINE CLINIC | Age: 55
End: 2020-02-14

## 2020-02-14 NOTE — TELEPHONE ENCOUNTER
Dr Torri Reyes, please address pts request for something for lice, pt came in 2 days ago requesting something.
Patient calling for medication, please advise.
Pt. Call stating that she needs medication for lice, because it is getting worse.      Please review and advise
(gastroesophageal reflux disease)     H/O chronic respiratory failure     COPD (chronic obstructive pulmonary disease) (HCC)     Orthostatic hypotension     Medication refill     Seasonal allergies     Acne comedone     Bunion     Viral URI     High cholesterol     Ear fullness, right

## 2020-02-18 ENCOUNTER — OFFICE VISIT (OUTPATIENT)
Dept: FAMILY MEDICINE CLINIC | Age: 55
End: 2020-02-18
Payer: MEDICARE

## 2020-02-18 ENCOUNTER — HOSPITAL ENCOUNTER (OUTPATIENT)
Age: 55
Setting detail: SPECIMEN
Discharge: HOME | End: 2020-02-18
Payer: MEDICARE

## 2020-02-18 ENCOUNTER — TELEPHONE (OUTPATIENT)
Dept: PULMONOLOGY | Age: 55
End: 2020-02-18

## 2020-02-18 VITALS
DIASTOLIC BLOOD PRESSURE: 83 MMHG | SYSTOLIC BLOOD PRESSURE: 125 MMHG | HEART RATE: 73 BPM | HEIGHT: 67 IN | BODY MASS INDEX: 21.82 KG/M2 | WEIGHT: 139 LBS

## 2020-02-18 LAB
CHOLESTEROL/HDL RATIO: 2.5
CHOLESTEROL: 172 MG/DL
HCT VFR BLD CALC: 39.9 % (ref 36.3–47.1)
HDLC SERPL-MCNC: 70 MG/DL
HEMOGLOBIN: 12.8 G/DL (ref 11.9–15.1)
LDL CHOLESTEROL: 84 MG/DL (ref 0–130)
MCH RBC QN AUTO: 30.3 PG (ref 25.2–33.5)
MCHC RBC AUTO-ENTMCNC: 32.1 G/DL (ref 28.4–34.8)
MCV RBC AUTO: 94.3 FL (ref 82.6–102.9)
NRBC AUTOMATED: 0 PER 100 WBC
PDW BLD-RTO: 12.1 % (ref 11.8–14.4)
PLATELET # BLD: 314 K/UL (ref 138–453)
PMV BLD AUTO: 10.2 FL (ref 8.1–13.5)
RBC # BLD: 4.23 M/UL (ref 3.95–5.11)
TRIGL SERPL-MCNC: 88 MG/DL
TSH SERPL DL<=0.05 MIU/L-ACNC: 0.43 MIU/L (ref 0.3–5)
VLDLC SERPL CALC-MCNC: NORMAL MG/DL (ref 1–30)
WBC # BLD: 8.3 K/UL (ref 3.5–11.3)

## 2020-02-18 PROCEDURE — 99213 OFFICE O/P EST LOW 20 MIN: CPT | Performed by: STUDENT IN AN ORGANIZED HEALTH CARE EDUCATION/TRAINING PROGRAM

## 2020-02-18 RX ORDER — AMOXICILLIN 250 MG
CAPSULE ORAL
Qty: 60 TABLET | Refills: 0 | Status: SHIPPED | OUTPATIENT
Start: 2020-02-18 | End: 2020-04-28

## 2020-02-18 RX ORDER — ATORVASTATIN CALCIUM 20 MG/1
TABLET, FILM COATED ORAL
Qty: 30 TABLET | Refills: 3 | Status: SHIPPED | OUTPATIENT
Start: 2020-02-18 | End: 2020-05-26 | Stop reason: SDUPTHER

## 2020-02-18 RX ORDER — OMEPRAZOLE 40 MG/1
40 CAPSULE, DELAYED RELEASE ORAL DAILY
Qty: 60 CAPSULE | Refills: 1 | Status: SHIPPED | OUTPATIENT
Start: 2020-02-18 | End: 2020-05-26 | Stop reason: SDUPTHER

## 2020-02-18 ASSESSMENT — ENCOUNTER SYMPTOMS
DIARRHEA: 0
SHORTNESS OF BREATH: 0
ABDOMINAL PAIN: 0
NAUSEA: 0
VOMITING: 0
CONSTIPATION: 0
COUGH: 0

## 2020-02-18 NOTE — PROGRESS NOTES
Plan:    1. Head lice  -instructed patient proper administration of permerthrin   - permethrin ([employer] PERMETHRIN) 1 % lotion; Indications: Lice Apply lotion to saturate the hair and scalp. Leave on for 10 minutes before rinsing off with water; remove remaining nits; may repeat in 1 wk if needed  Dispense: 1 Bottle; Refill: 1    2. Screening for hyperlipidemia  -last cholesterol done 2011   - Lipid Panel; Future    3. High cholesterol  - atorvastatin (LIPITOR) 20 MG tablet; take 1 tablet by mouth once daily  Dispense: 30 tablet; Refill: 3    4. Constipation, unspecified constipation type  - senna-docusate (RA LAXATIVE & STOOL SOFTENER) 8.6-50 MG per tablet; take 2 tablets by mouth once daily if needed for constipation  Dispense: 60 tablet; Refill: 0    5. Gastroesophageal reflux disease with esophagitis  - omeprazole (PRILOSEC) 40 MG delayed release capsule; Take 1 capsule by mouth daily  Dispense: 60 capsule; Refill: 1  -history of Vargas's esophagus   Will discuss at next visit . Will need GI referral for possible EGD for assessment    6. Fatigue, unspecified type  - TSH With Reflex Ft4; Future  - CBC; Future    7. Encounter for screening mammogram for breast cancer  -Last mammogram 2017: Negative   - YOVANA Digital Screen Bilateral [[id number]]; Future    8. Medication refill  - senna-docusate (RA LAXATIVE & STOOL SOFTENER) 8.6-50 MG per tablet; take 2 tablets by mouth once daily if needed for constipation  Dispense: 60 tablet; Refill: 0  - omeprazole (PRILOSEC) 40 MG delayed release capsule; Take 1 capsule by mouth daily  Dispense: 60 capsule; Refill: 1        CT Lung screen (9/10/2019)   No suspicious pulmonary nodule.  Sequelae of chronic granulomatous disease.       Per ACR Lung-RADS Version 1.0       Category 2, Benign appearance or behavior.  Management:  Continue annual lung   screening with LDCT in 12 months. (probability of malignancy <1%).            Requested Prescriptions     Signed Prescriptions Disp Refills    permethrin ([employer] PERMETHRIN) 1 % lotion 1 Bottle 1     Sig: Indications: Lice Apply lotion to saturate the hair and scalp. Leave on for 10 minutes before rinsing off with water; remove remaining nits; may repeat in 1 wk if needed    senna-docusate (RA LAXATIVE & STOOL SOFTENER) 8.6-50 MG per tablet 60 tablet 0     Sig: take 2 tablets by mouth once daily if needed for constipation    atorvastatin (LIPITOR) 20 MG tablet 30 tablet 3     Sig: take 1 tablet by mouth once daily    omeprazole (PRILOSEC) 40 MG delayed release capsule 60 capsule 1     Sig: Take 1 capsule by mouth daily       Medications Discontinued During This Encounter   Medication Reason    permethrin ([employer] PERMETHRIN) 1 % lotion REORDER    senna-docusate (RA LAXATIVE & STOOL SOFTENER) 8.6-50 MG per tablet REORDER    atorvastatin (LIPITOR) 20 MG tablet REORDER    omeprazole (PRILOSEC) 40 MG delayed release capsule REORDER       Laurel received counseling on the following healthy behaviors:nutrition, exercise and medication adherence    Discussed use, benefit, and side effects of prescribed medications. Barriers to medication compliance addressed. All patient questions answered. Pt voicedunderstanding. Return in about 3 months (around 5/18/2020) for fatigue, nereida's esophagus .

## 2020-02-18 NOTE — PROGRESS NOTES
Visit Information    Have you changed or started any medications since your last visit including any over-the-counter medicines, vitamins, or herbal medicines? no   Have you stopped taking any of your medications? Is so, why? -  no  Are you having any side effects from any of your medications? - no    Have you seen any other physician or provider since your last visit?  no   Have you had any other diagnostic tests since your last visit?  no   Have you been seen in the emergency room and/or had an admission in a hospital since we last saw you?  no   Have you had your routine dental cleaning in the past 6 months?  no     Do you have an active MyChart account? If no, what is the barrier?   Yes    Patient Care Team:  Ronaldo Carlson MD as PCP - General (Family Medicine)  Jyothi Garces MD as PCP - [facility]  Simin Gomez MD as Surgeon (Cardiothoracic Surgery)  Stephanie Vallejo MD as Consulting Physician (Pulmonology)  Catherine Pulliam RN as Nurse Navigator    Medical History Review  Past Medical, Family, and Social History reviewed and does not contribute to the patient presenting condition    Health Maintenance   Topic Date Due    HIV screen  07/17/1979    Shingles Vaccine (1 of 2) 07/17/2014    Lipid screen  05/02/2018    Breast cancer screen  07/25/2019    Low dose CT lung screening  09/10/2020    Annual Wellness Visit (AWV)  01/03/2021    DTaP/Tdap/Td vaccine (2 - Td) 04/30/2024    Colon cancer screen colonoscopy  07/14/2026    Flu vaccine  Completed    Pneumococcal 0-64 years Vaccine  Completed    Hepatitis C screen  Completed    Hepatitis A vaccine  Aged Out    Hepatitis B vaccine  Aged Out    Hib vaccine  Aged Out    Meningococcal (ACWY) vaccine  Aged Out

## 2020-02-18 NOTE — PATIENT INSTRUCTIONS
Thank you for letting us take care of you today. We hope all your questions were addressed. If a question was overlooked or something else comes to mind after you return home, please contact a member of your Care Team listed below. Please make sure you have a routine office visit set up to follow-up on [address]. Your Care Team at [place] [address] is Team #2  Rodger Freed DO (Faculty)  Varsha Jackson MD (Faculty)  Elliot Kurtz MD (Resident)  Chika Shultz MD (Resident)  Ines Walker MD (Resident)  Elo Sahu MD (Resident)  JEANIE Rios. ,DERIK ABDUL, [facility] office)  [facility]  Luis Peters ([facility])  Lucyann Goodpasture, [place] ([id number] )  Kimberlee Murphy, [facility] (Clinical Pharmacist)     Office phone number: [phone]    If you need to get in right away due to illness, please be advised we have \"Same Day\" appointments available Monday-Friday. Please call us at [phone] option #3 to schedule your \"Same Day\" appointment.

## 2020-04-28 RX ORDER — AMOXICILLIN 250 MG
CAPSULE ORAL
Qty: 60 TABLET | Refills: 0 | Status: SHIPPED | OUTPATIENT
Start: 2020-04-28 | End: 2020-05-26 | Stop reason: SDUPTHER

## 2020-05-26 ENCOUNTER — OFFICE VISIT (OUTPATIENT)
Dept: FAMILY MEDICINE CLINIC | Age: 55
End: 2020-05-26
Payer: MEDICARE

## 2020-05-26 VITALS
SYSTOLIC BLOOD PRESSURE: 98 MMHG | HEART RATE: 79 BPM | WEIGHT: 137 LBS | TEMPERATURE: 99 F | DIASTOLIC BLOOD PRESSURE: 68 MMHG | BODY MASS INDEX: 21.46 KG/M2

## 2020-05-26 PROBLEM — F50.2 BULIMIA NERVOSA, PURGING TYPE: Status: ACTIVE | Noted: 2020-05-26

## 2020-05-26 PROBLEM — F50.20 BULIMIA NERVOSA, PURGING TYPE: Status: ACTIVE | Noted: 2020-05-26

## 2020-05-26 PROCEDURE — G8420 CALC BMI NORM PARAMETERS: HCPCS | Performed by: STUDENT IN AN ORGANIZED HEALTH CARE EDUCATION/TRAINING PROGRAM

## 2020-05-26 PROCEDURE — 99213 OFFICE O/P EST LOW 20 MIN: CPT | Performed by: STUDENT IN AN ORGANIZED HEALTH CARE EDUCATION/TRAINING PROGRAM

## 2020-05-26 PROCEDURE — G8427 DOCREV CUR MEDS BY ELIG CLIN: HCPCS | Performed by: STUDENT IN AN ORGANIZED HEALTH CARE EDUCATION/TRAINING PROGRAM

## 2020-05-26 PROCEDURE — 1036F TOBACCO NON-USER: CPT | Performed by: STUDENT IN AN ORGANIZED HEALTH CARE EDUCATION/TRAINING PROGRAM

## 2020-05-26 PROCEDURE — 3017F COLORECTAL CA SCREEN DOC REV: CPT | Performed by: STUDENT IN AN ORGANIZED HEALTH CARE EDUCATION/TRAINING PROGRAM

## 2020-05-26 RX ORDER — ATORVASTATIN CALCIUM 20 MG/1
TABLET, FILM COATED ORAL
Qty: 30 TABLET | Refills: 3 | Status: SHIPPED | OUTPATIENT
Start: 2020-05-26 | End: 2020-09-01

## 2020-05-26 RX ORDER — OMEPRAZOLE 40 MG/1
40 CAPSULE, DELAYED RELEASE ORAL DAILY
Qty: 60 CAPSULE | Refills: 1 | Status: SHIPPED | OUTPATIENT
Start: 2020-05-26 | End: 2020-12-08 | Stop reason: SDUPTHER

## 2020-05-26 RX ORDER — AMOXICILLIN 250 MG
CAPSULE ORAL
Qty: 60 TABLET | Refills: 0 | Status: SHIPPED | OUTPATIENT
Start: 2020-05-26 | End: 2020-07-27

## 2020-05-26 RX ORDER — AMOXICILLIN AND CLAVULANATE POTASSIUM 875; 125 MG/1; MG/1
1 TABLET, FILM COATED ORAL 2 TIMES DAILY
Qty: 14 TABLET | Refills: 0 | Status: SHIPPED | OUTPATIENT
Start: 2020-05-26 | End: 2020-06-02

## 2020-05-26 ASSESSMENT — ENCOUNTER SYMPTOMS
CONSTIPATION: 0
RHINORRHEA: 0
SHORTNESS OF BREATH: 0
VOICE CHANGE: 0
DIARRHEA: 0
ABDOMINAL PAIN: 0
TROUBLE SWALLOWING: 0
SORE THROAT: 0
COUGH: 0
VOMITING: 0
NAUSEA: 0

## 2020-05-26 NOTE — PROGRESS NOTES
Subjective:    Shirin Cousin is a 53 y.o. female with  has a past medical history of Vargas's esophagus, Bipolar disorder ([address] Chronic obstructive pulmonary disease (COPD) ([facility] [address] Depression, Dyspnea on exertion, Eating disorder, Empyema ([facility] [address] Fatigue due to depression, GERD (gastroesophageal reflux disease), H/O chronic respiratory failure, Moderate persistent asthma, Seasonal allergies, Smoking, and Substance abuse ([facility] [address] Family History   Problem Relation Age of Onset    Asthma Mother     Stroke Father     Heart Attack Father     Cancer Maternal Grandmother         skin cancer    Diabetes Maternal Uncle        Presented tot office today for:  Chief Complaint   Patient presents with    Medication Refill     here for med refills, no other issues. States she is doing well       HPI   54 yo female presents for f/u Vargas's/GERD. GERD/Vargas's esophagus   EGD 5 years ago which diagnosed Vargas's   Will need another considering gerd history as well as bulimia     Pt has history of bulimia. Had an episode last week. Is able to recover on own. Has these episodes once a week     Pt complaining of tooth ache   Upper right front tooth pain/sensitivity      Pt states she is taking Trintellix 30mg   States she gets the dosage straight from company. Started due to depression   Prescribed from [address] [place] [address] , Dr. Fidencio Marrufo     Review of Systems   Constitutional: Negative for chills, fatigue and fever. HENT: Positive for dental problem. Negative for congestion, rhinorrhea, sore throat, trouble swallowing and voice change. Eyes: Negative for visual disturbance. Respiratory: Negative for cough and shortness of breath. Cardiovascular: Negative for chest pain, palpitations and leg swelling. Gastrointestinal: Negative for abdominal pain, constipation, diarrhea, nausea and vomiting. Genitourinary: Negative for dysuria and frequency. Musculoskeletal: Negative for myalgias.    Neurological:

## 2020-05-26 NOTE — PROGRESS NOTES
Attending Physician Statement  I have discussed the care of Mini Reyes, 53 y.o. female,including pertinent history and exam findings,  with the resident Dr. Carmen Flores MD.  History:  Chief Complaint   Patient presents with    Medication Refill     here for med refills, no other issues. States she is doing well     Patient is here for follow up on GERD and Vargas's esophagus. Patient has history of depression, previous drug use and Bulimia nervosa. I have reviewed the key elements of the encounter with the resident. Examination was done by resident as documented in residents note. BP Readings from Last 3 Encounters:   05/26/20 98/68   02/18/20 125/83   01/28/20 116/79     BP 98/68 (Site: Left Upper Arm, Position: Sitting, Cuff Size: Medium Adult)   Pulse 79   Temp 99 °F (37.2 °C)   Wt 137 lb (62.1 kg)   BMI 21.46 kg/m²   Lab Results   Component Value Date    WBC 8.3 02/18/2020    HGB 12.8 02/18/2020    HCT 39.9 02/18/2020     02/18/2020    CHOL 172 02/18/2020    TRIG 88 02/18/2020    HDL 70 02/18/2020    ALT 15 02/05/2018    AST 18 02/05/2018     12/11/2017    K 4.1 06/19/2018     12/11/2017    CREATININE 0.56 12/11/2017    BUN 15 12/11/2017    CO2 21 12/11/2017    TSH 0.43 02/18/2020    INR 1.2 02/21/2014    LABA1C 5.5 04/19/2018     Lab Results   Component Value Date    CALCIUM 8.4 (L) 12/11/2017     Lab Results   Component Value Date    LDLCHOLESTEROL 84 02/18/2020     I agree with the assessment, plan and diagnosis of    Diagnosis Orders   1. Vargas's esophagus without dysplasia  Tavia Griffin MD, Gastroenterology, [address]   2. Gastroesophageal reflux disease with esophagitis  omeprazole (PRILOSEC) 40 MG delayed release capsule    [facility] Closs, MD, Gastroenterology, [address]   3. Bulimia nervosa, purging type  Tavia Griffin MD, Gastroenterology, [address]   4. Pain, dental  External Referral To Dentistry   5.  Gingivitis External Referral To Dentistry    amoxicillin-clavulanate (AUGMENTIN) 875-125 MG per tablet   6. High cholesterol  atorvastatin (LIPITOR) 20 MG tablet   7. Constipation, unspecified constipation type  senna-docusate (RA P COL-RITE) 8.6-50 MG per tablet   8. Medication refill  omeprazole (PRILOSEC) 40 MG delayed release capsule    senna-docusate (RA P COL-RITE) 8.6-50 MG per tablet     I agree with  orders as documented by the resident. Recommendations:   Physical exam is benign in exception for dental carries and gingivitis, she was prescribed Augmentin and referred to dentistry. Patient was counseled regarding need for follow up EGD and importance of bulimia treatment. Referral provided to GI. Patient declined psycho therapy and planning follow up with established psychiatrist. Medications regimen reviewed and refills provided. Health maintenance reviewed and an order provided for screening mammogram.   Return in about 6 months (around 11/26/2020) for dos santos's esophagus, bulimia , gingivits .    (Lakshmi Aguilar ) Dr. Rosemary Petersen MD

## 2020-05-27 ENCOUNTER — TELEPHONE (OUTPATIENT)
Dept: GASTROENTEROLOGY | Age: 55
End: 2020-05-27

## 2020-05-27 NOTE — TELEPHONE ENCOUNTER
Mario Beard called to schedule an appt and requested an EGD, pt was given as soon as 5/29 in the [place] office with [address]. Pt declined and states \"oh no that is to soon. \" Pt is requesting to be scheduled at our [facility] office. Pt advised that at this time, that location is closed due to the pandemic and we are uncertain at this time when we will open it back up. Patient advised we would place a note in her chart and referral and set it to come back to schedule within 14 days. Mario Beard is agreeable to this at this time and verbally states she is \"ok with this\".

## 2020-07-07 ENCOUNTER — VIRTUAL VISIT (OUTPATIENT)
Dept: PULMONOLOGY | Age: 55
End: 2020-07-07
Payer: MEDICARE

## 2020-07-07 PROCEDURE — 99214 OFFICE O/P EST MOD 30 MIN: CPT | Performed by: INTERNAL MEDICINE

## 2020-07-07 ASSESSMENT — SLEEP AND FATIGUE QUESTIONNAIRES
HOW LIKELY ARE YOU TO NOD OFF OR FALL ASLEEP WHILE SITTING AND TALKING TO SOMEONE: 0
HOW LIKELY ARE YOU TO NOD OFF OR FALL ASLEEP WHILE SITTING QUIETLY AFTER LUNCH WITHOUT ALCOHOL: 1
HOW LIKELY ARE YOU TO NOD OFF OR FALL ASLEEP WHILE SITTING AND READING: 1
HOW LIKELY ARE YOU TO NOD OFF OR FALL ASLEEP WHILE SITTING INACTIVE IN A PUBLIC PLACE: 0
HOW LIKELY ARE YOU TO NOD OFF OR FALL ASLEEP IN A CAR, WHILE STOPPED FOR A FEW MINUTES IN TRAFFIC: 0
HOW LIKELY ARE YOU TO NOD OFF OR FALL ASLEEP WHEN YOU ARE A PASSENGER IN A CAR FOR AN HOUR WITHOUT A BREAK: 0
HOW LIKELY ARE YOU TO NOD OFF OR FALL ASLEEP WHILE WATCHING TV: 2
ESS TOTAL SCORE: 7
HOW LIKELY ARE YOU TO NOD OFF OR FALL ASLEEP WHILE LYING DOWN TO REST IN THE AFTERNOON WHEN CIRCUMSTANCES PERMIT: 3

## 2020-07-07 NOTE — PROGRESS NOTES
PULMONARY OUTPATIENT PROGRESS NOTE    Telehealth visit  Doxy me/virtual visual visit. Patient:  Osmany Stratton  YOB: 1965    MRN: [id number]     Acct:        Pt seen and Chart reviewed. Mr/Ms Osmany Stratton is here in followup for    Diagnosis   1. Chronic obstructive pulmonary disease, unspecified COPD type ([facility] [address]    2. Mild persistent asthma without complication    3. Fatigue, unspecified type    4. Psychophysiological insomnia      She is here for follow-up of her asthmatic bronchitis/COPD, Insomnia and chronic fatigue. Since she was seen last time she denies much symptoms or change in her symptoms she had no exacerbation no ER visit no steroid use or antibiotic in the last 6 months. She only complained of shortness of breath when she has to move heavy objects lifting and when she has to lift her arm above her head also she claimed that she get tightness or noise when she tried to bend otherwise denies shortness of breath. She is able to do her regular activities at home and outside she thinks that she can walk 1-2 block without much problem and she can go stairs up 1-2 flight without symptoms. She denies daily or persistent wheezing denies cough and denies nocturnal awakening with cough shortness of breath and chest tightness. She claimed it is hot and humid she has difficulty taking deep breath and she feels chest tightness and she is avoiding to go outside as it is patiently hot and humid last week and this week. She denies fever night sweats chills weight loss weight gain and loss of appetite  She is using Arnuity once daily and she is using Incruse once daily she is compliant with medication denies any problem. She has not been using albuterol she claims that last time she used albuterol was years ago and she usually take rest when she feels short of breath and it will go away without taking an inhaler.   Complains of Shortness edema, irregular heartbeat, loss of consciousness, murmur, orthopnea, paroxysmal nocturnal dyspnea or rapid heart rate  Gastrointestinal ROS: no abdominal pain, change in bowel habits, or black or bloody stools  Musculoskeletal ROS: negative for - gait disturbance, joint pain or joint stiffness  CNS: negative for dizziness diplopia weakness tingling numbness syncope  Skin and lymphatics: negative for skin rash and skin lesion  Hem/onc: negative for petechia, easy bleeding and bruising    Sleep Medicine 7/7/2020   Sitting and reading 1   Watching TV 2   Sitting, inactive in a public place (e.g. a theatre or a meeting) 0   As a passenger in a car for an hour without a break 0   Lying down to rest in the afternoon when circumstances permit 3   Sitting and talking to someone 0   Sitting quietly after a lunch without alcohol 1   In a car, while stopped for a few minutes in traffic 0   Total score 7       Allergies: Allergies   Allergen Reactions    Sesame [Oleum Sesami] Anaphylaxis     Sesame seeds    Codeine Nausea And Vomiting       Medications:  Outpatient Encounter Medications as of 7/7/2020   Medication Sig Dispense Refill    omeprazole (PRILOSEC) 40 MG delayed release capsule Take 1 capsule by mouth daily 60 capsule 1    atorvastatin (LIPITOR) 20 MG tablet take 1 tablet by mouth once daily 30 tablet 3    senna-docusate (RA P COL-RITE) 8.6-50 MG per tablet take 2 tablets by mouth once daily if needed for constipation 60 tablet 0    permethrin ([employer] PERMETHRIN) 1 % lotion Indications: Lice Apply lotion to saturate the hair and scalp.   Leave on for 10 minutes before rinsing off with water; remove remaining nits; may repeat in 1 wk if needed 1 Bottle 1    tazarotene (AVAGE) 0.1 % cream apply topically to affected area every evening 30 g 11    fluticasone (ARNUITY ELLIPTA) 200 MCG/ACT AEPB Inhale 1 puff into the lungs daily 30 each 11    Umeclidinium Bromide (INCRUSE ELLIPTA) 62.5 MCG/INH AEPB Inhale 1 puff into the lungs daily 1 each 11    tiZANidine (ZANAFLEX) 4 MG tablet take 1 tablet by mouth three times a day 30 tablet 0    RA ALLERGY RELIEF 10 MG tablet take 1 tablet by mouth once daily 30 tablet 0    polyethylene glycol (GLYCOLAX) powder take 17GM (DISSOLVED IN WATER) by mouth once daily (Patient taking differently: as needed take 17GM (DISSOLVED IN WATER) by mouth once daily) 510 g 3    EPINEPHrine (EPIPEN 2-CRUZ) 0.3 MG/0.3ML SOAJ injection Use as directed for allergic reaction 1 each 2    albuterol sulfate HFA (VENTOLIN HFA) 108 (90 Base) MCG/ACT inhaler Inhale 2 puffs into the lungs every 6 hours as needed for Wheezing 1 Inhaler 3    Cholecalciferol (VITAMIN D3) 5000 units TABS Take 1 tablet by mouth daily      ferrous sulfate 325 (65 FE) MG tablet take 1 tablet by mouth once daily WITH BREAKFAST 30 tablet 3    TRINTELLIX 20 MG TABS tablet take 1 tablet by mouth once daily  0    aspirin 325 MG EC tablet Take 1 tablet by mouth daily 42 tablet 0     No facility-administered encounter medications on file as of 7/7/2020. Objective:    Physical Exam:  Vitals: There were no vitals taken for this visit. Last 3 weights: Wt Readings from Last 3 Encounters:   05/26/20 137 lb (62.1 kg)   02/18/20 139 lb (63 kg)   01/28/20 142 lb (64.4 kg)     There is no height or weight on file to calculate BMI.     Physical Examination:   General appearance - alert, well appearing, and in no distress  Mental status - alert, oriented to person, place, and time  Eyes - Extraocular eye movements intact  Ears - right ear normal, left ear normal  Nose -not examined  Mouth -not examined  Neck -no abnormality seen on visual exam  Chest -no distress, retraction and no tachypnea on visual exam  Heart -not examined  Abdomen -not examined  Neurological - alert, oriented, normal speech, no focal findings or movement disorder noted  Extremities -not examined  Skin -not examined  Labs:  None    CBC:   WBC   Date Value Ref Range Status   02/18/2020 8.3 3.5 - 11.3 k/uL Final     Hemoglobin   Date Value Ref Range Status   02/18/2020 12.8 11.9 - 15.1 g/dL Final     Platelets   Date Value Ref Range Status   02/18/2020 314 138 - 453 k/uL Final     BMP:   Sodium   Date Value Ref Range Status   12/11/2017 138 135 - 144 mmol/L Final     Potassium   Date Value Ref Range Status   06/19/2018 4.1 3.7 - 5.3 mmol/L Final     Chloride   Date Value Ref Range Status   12/11/2017 101 98 - 107 mmol/L Final     CO2   Date Value Ref Range Status   12/11/2017 21 20 - 31 mmol/L Final     BUN   Date Value Ref Range Status   12/11/2017 15 6 - 20 mg/dL Final     CREATININE   Date Value Ref Range Status   12/11/2017 0.56 0.50 - 0.90 mg/dL Final   03/14/2016 0.62 0.50 - 0.90 mg/dL Final   03/10/2014 0.56 0.4 - 1.0 mg/dL Final     Glucose   Date Value Ref Range Status   12/11/2017 155 (H) 70 - 99 mg/dL Final     S. Calcium:   Calcium   Date Value Ref Range Status   12/11/2017 8.4 (L) 8.6 - 10.4 mg/dL Final     S. Ionized Calcium: No results found for: IONCA  S. Magnesium: No results found for: MG  S. Phosphorus: No results found for: PHOS  S. Glucose:   POC Glucose   Date Value Ref Range Status   02/19/2014 79 65 - 105 mg/dL Final   02/18/2014 126 (H) 65 - 105 mg/dL Final   02/17/2014 117 (H) 65 - 105 mg/dL Final     Glycosylated hemoglobin A1C:   Hemoglobin A1C   Date Value Ref Range Status   04/19/2018 5.5 % Final       Pulmonary Functions Testing Results:  01/23/17 FEV1 2.07 76%, post BD 86%(12% improvement), FVC 2.94 88%, TLC 4.65 87%, DLCO 17.67 86%    8/3/15: FEV1 1.72 63%,Post BD 83%  and 32% improvemnent, FVC PBD 3.19 88%, GHV0WPK 93% TLC 1.70 85% , DLCO 4.12 86%    Blood Gases: No results found for: PH, PCO2, PO2, HCO3, O2SAT    Radiological reports:    CXR 8/12/2014  IMPRESSION:       No acute radiographic abnormality, or significant interval change. Mild    bibasilar subsegmental atelectasis and/or scarring.          CT Scans  09/10/2019  Mediastinum:  Heart size at the upper limit of normal.  Trace pericardial   effusion.  Thoracic aorta is normal caliber.  No lymphadenopathy.  Calcified   right hilar lymph nodes.  Thyroid and esophagus are unremarkable.       Lungs/Pleura:  No consolidation or pleural effusion.  Mild left apical   scarring.  Mild scarring at the lung bases.  Stable 4 mm nodules in the right   lower lobe on image 69 and right middle lobe on image 67.  Scattered   calcified granulomas. .         04/26/2016  Mild scarring changes similar to May 8, 2014 radiography. Calcified right hilar lymph nodes and right lung granulomas suggesting prior granulomatous disease. Small hiatal hernia.           Immunization History   Administered Date(s) Administered    Influenza Virus Vaccine 02/13/2014    Influenza, Kimani Lizabeth, IM, (6 mo and older Fluzone, Flulaval, Fluarix and 3 yrs and older Afluria) 12/21/2017    Influenza, Quadv, IM, PF (6 mo and older Fluzone, Flulaval, Fluarix, and 3 yrs and older Afluria) 11/20/2018, 01/03/2020    Influenza, Triv, 3 Years and older, IM (Afluria (5 yrs and older) 11/08/2016    Pneumococcal Conjugate 13-valent ([id number]) 11/08/2016    Pneumococcal Polysaccharide ([id number]) 02/13/2014    Tdap (Boostrix, Adacel) 04/30/2014       Assessment:      1. Chronic obstructive pulmonary disease, unspecified COPD type ([facility] [address]   2. Mild persistent asthma without complication   3. Fatigue, unspecified type improved   4. Psychophysiological insomnia improve         Plan:    Continue with Incruse once daily. Continue with Arnuity. Technique of using medication discussed.   Albuterol as needed  She does have refills on medication until January 2021  Annual Flu vaccine commended in fall  Uptodate on pneumonia vaccine   Maintain an active lifestyle   Smoking cessation to continue   As she had history of smoking for 37 years and stopped 5 years ago she will need CT low dose and well screening CT scan and next one will be September 2020.    Regular sleep-wake cycle. Sleep hygiene discussed. Given instruction for sleep hygiene. RTC 6 months  Questions answered to pt's satisfaction      Please note that this chart was generated using voice recognition Dragon dictation software. Although every effort was made to ensure the accuracy of this automated transcription, some errors in transcription may have occurred. Bradley Davenport MD             7/7/2020, 11:44 AM     Natividad Cristina is a 53 y.o. female being evaluated by a Virtual Visit (video/telephone visit) encounter to address concerns as mentioned above. Due to this being a TeleHealth encounter (During [facility]-16 public health emergency), evaluation of the following organ systems was limited: Vitals/Constitutional/EENT/Resp/CV/GI//MS/Neuro/Skin/Heme-Lymph-Imm. Pursuant to the emergency declaration under the [facility] waiver authority and the [employer] Act, this Virtual Visit was conducted with patient's consent, to reduce the patient's risk of exposure to COVID-19 and provide necessary medical care. The patient has also been advised to contact this office for worsening conditions or problems, and seek emergency medical treatment and/or call 911 if deemed necessary. Patient identification was verified at the start of the visit: Yes    Total time spent for this encounter: 25 minutes    Services were provided through a video/telephone synchronous discussion virtually to substitute for in-person clinic visit. Patient and provider were located at their individual locations at home and office respectively. --Bradley Davenport MD on 7/7/2020 at 11:59 AM    An electronic signature was used to authenticate this note.

## 2020-07-07 NOTE — PATIENT INSTRUCTIONS
Left message for patient to call back to schedule follow up Needs lung screening as well scheduled. Diann GIBBSR

## 2020-07-27 RX ORDER — AMOXICILLIN 250 MG
CAPSULE ORAL
Qty: 60 TABLET | Refills: 0 | Status: SHIPPED | OUTPATIENT
Start: 2020-07-27 | End: 2020-09-01

## 2020-07-27 NOTE — TELEPHONE ENCOUNTER
Last visit:   Last Med refill:   Does patient have enough medication for 72 hours: No:     Next Visit Date:  Future Appointments   Date Time Provider Zane Olive   9/3/2020  2:00 PM Jose A Murphy MD grtlk exc [id number]   1/26/2021 10:30 AM Gladys Viera MD  [address] Maintenance   Topic Date Due    HIV screen  07/17/1979    Shingles Vaccine (1 of 2) 07/17/2014    Breast cancer screen  07/25/2019    Flu vaccine (1) 09/01/2020    Low dose CT lung screening  09/10/2020    Annual Wellness Visit (AWV)  01/03/2021    Lipid screen  02/18/2021    DTaP/Tdap/Td vaccine (2 - Td) 04/30/2024    Colon cancer screen colonoscopy  07/14/2026    Pneumococcal 0-64 years Vaccine  Completed    Hepatitis C screen  Completed    Hepatitis A vaccine  Aged Out    Hepatitis B vaccine  Aged Out    Hib vaccine  Aged Out    Meningococcal (ACWY) vaccine  Aged Out       Hemoglobin A1C (%)   Date Value   04/19/2018 5.5   02/12/2014 5.7             ( goal A1C is < 7)   No results found for: LABMICR  LDL Cholesterol (mg/dL)   Date Value   02/18/2020 84   05/02/2017 152 (H)       (goal LDL is <100)   AST (U/L)   Date Value   02/05/2018 18     ALT (U/L)   Date Value   02/05/2018 15     BUN (mg/dL)   Date Value   12/11/2017 15     BP Readings from Last 3 Encounters:   05/26/20 98/68   02/18/20 125/83   01/28/20 116/79          (goal 120/80)    All Future Testing planned in [facility]  Lab Frequency Next Occurrence   YOVANA Digital Screen Bilateral [[id number]] Once 10/10/2020   CT lung screen [Initial/Annual] Once 09/14/2020               Patient Active Problem List:     Vargas esophagus     Acne     Dental caries     Smoking     Hypoxia     Empyema (HCC)     GERD (gastroesophageal reflux disease)     H/O chronic respiratory failure     COPD (chronic obstructive pulmonary disease) (HCC)     Orthostatic hypotension     Medication refill     Seasonal allergies     Acne comedone     Bunion     Viral URI     High cholesterol     Ear fullness, right     Bulimia nervosa, purging type           Please address the medication refill and close the encounter. If I can be of assistance, please route to the applicable pool. Thank you.

## 2020-09-01 RX ORDER — ATORVASTATIN CALCIUM 20 MG/1
TABLET, FILM COATED ORAL
Qty: 30 TABLET | Refills: 3 | Status: SHIPPED | OUTPATIENT
Start: 2020-09-01 | End: 2020-12-04

## 2020-09-01 RX ORDER — AMOXICILLIN 250 MG
CAPSULE ORAL
Qty: 60 TABLET | Refills: 2 | Status: SHIPPED | OUTPATIENT
Start: 2020-09-01 | End: 2020-12-08 | Stop reason: SDUPTHER

## 2020-09-01 NOTE — TELEPHONE ENCOUNTER
E-scribe request for RA P COL-RITE 8.6-50 MG per tablet. Please review and e-scribe if applicable.      Last Visit Date:    Next Visit Date:  8/31/2020    Hemoglobin A1C (%)   Date Value   04/19/2018 5.5   02/12/2014 5.7             ( goal A1C is < 7)   No results found for: LABMICR  LDL Cholesterol (mg/dL)   Date Value   02/18/2020 84       (goal LDL is <100)   AST (U/L)   Date Value   02/05/2018 18     ALT (U/L)   Date Value   02/05/2018 15     BUN (mg/dL)   Date Value   12/11/2017 15     BP Readings from Last 3 Encounters:   05/26/20 98/68   02/18/20 125/83   01/28/20 116/79          (goal 120/80)        Patient Active Problem List:     Vargas esophagus     Acne     Dental caries     Smoking     Hypoxia     Empyema (HCC)     GERD (gastroesophageal reflux disease)     H/O chronic respiratory failure     COPD (chronic obstructive pulmonary disease) (HCC)     Orthostatic hypotension     Medication refill     Seasonal allergies     Acne comedone     Bunion     Viral URI     High cholesterol     Ear fullness, right     Bulimia nervosa, purging type      ----Twyla Isabel

## 2020-09-14 ENCOUNTER — TELEPHONE (OUTPATIENT)
Dept: ONCOLOGY | Age: 55
End: 2020-09-14

## 2020-09-14 NOTE — LETTER
9/14/2020        Itz Mchugh  [address] [facility] [address]    Dear Itz Mchugh:    Your healthcare provider has ordered a low dose CT scan of the chest for lung cancer screening. You will find enclosed, information about CT lung screening. Please review the statement of understanding, you will be asked to sign a copy of this at the time of your CT scan    If you have not already been contacted to make the appointment for your scan, please call our scheduling department at [phone]    Keep in mind that CT lung screening does not take the place of smoking cessation. If you are a current smoker, you will find enclosed smoking cessation resources. Please do not hesitate to contact me if you have any questions or concerns.     [address] [facility],      [facility] Lung Screening [facility]  [phone]

## 2020-10-16 ENCOUNTER — HOSPITAL ENCOUNTER (OUTPATIENT)
Dept: CT IMAGING | Facility: CLINIC | Age: 55
Discharge: HOME | End: 2020-10-18
Payer: MEDICARE

## 2020-10-16 PROCEDURE — G0297 LDCT FOR LUNG CA SCREEN: HCPCS

## 2020-10-20 ENCOUNTER — OFFICE VISIT (OUTPATIENT)
Dept: GASTROENTEROLOGY | Age: 55
End: 2020-10-20
Payer: MEDICARE

## 2020-10-20 VITALS
BODY MASS INDEX: 22.24 KG/M2 | HEART RATE: 82 BPM | DIASTOLIC BLOOD PRESSURE: 80 MMHG | TEMPERATURE: 98.2 F | WEIGHT: 142 LBS | SYSTOLIC BLOOD PRESSURE: 100 MMHG

## 2020-10-20 PROCEDURE — 99204 OFFICE O/P NEW MOD 45 MIN: CPT | Performed by: INTERNAL MEDICINE

## 2020-10-20 PROCEDURE — G8420 CALC BMI NORM PARAMETERS: HCPCS | Performed by: INTERNAL MEDICINE

## 2020-10-20 PROCEDURE — G8427 DOCREV CUR MEDS BY ELIG CLIN: HCPCS | Performed by: INTERNAL MEDICINE

## 2020-10-20 PROCEDURE — 3017F COLORECTAL CA SCREEN DOC REV: CPT | Performed by: INTERNAL MEDICINE

## 2020-10-20 PROCEDURE — G8484 FLU IMMUNIZE NO ADMIN: HCPCS | Performed by: INTERNAL MEDICINE

## 2020-10-20 PROCEDURE — 1036F TOBACCO NON-USER: CPT | Performed by: INTERNAL MEDICINE

## 2020-10-20 ASSESSMENT — ENCOUNTER SYMPTOMS
COUGH: 0
ANAL BLEEDING: 0
TROUBLE SWALLOWING: 0
VOICE CHANGE: 0
DIARRHEA: 0
ABDOMINAL PAIN: 0
VOMITING: 0
SINUS PRESSURE: 0
SORE THROAT: 0
BLOOD IN STOOL: 0
RECTAL PAIN: 0
CHOKING: 0
WHEEZING: 0
BACK PAIN: 0
ABDOMINAL DISTENTION: 0
NAUSEA: 0
CONSTIPATION: 0

## 2020-10-20 NOTE — PROGRESS NOTES
Reason for Referral:   Geovany Nath MD  [address]  [facility], [address] [place] [address]    No chief complaint on file. HISTORY OF PRESENT ILLNESS: Juventino Wilson is a 63 y.o. female , referred for evaluation of Vargas's esophagus. She reports intermittent heartburns. She has bulimia. She has had vomiting every 2 weeks or so. It used to be much worse before. She reports heavy alcohol use and cocaine use until 2016. She reports Vargas's esophagus by endoscopy. Most recently in 2014. She takes omeprazole daily. No blood in stool or melena. She reports normal colonoscopy in 2016. She was recommended to repeat in 5 years. She reports no family history of GI malignancy. Past Medical,Family, and Social History reviewed and does not contribute to the patient presentingcondition. Patient's PMH/PSH,SH,PSYCH Hx, MEDs, ALLERGIES, and ROS were all reviewed and updated in the appropriate sections. PAST MEDICAL HISTORY:  Past Medical History:   Diagnosis Date    Vargas's esophagus     for 20 yrs on and off    Bipolar disorder ([address]     recently diagnosed and placed on meds    Chronic obstructive pulmonary disease (COPD) ([address]     Depression     On Meds    Dyspnea on exertion     Eating disorder     bulemia    Empyema ([facility] [address] 2/17/2014    left    Fatigue due to depression     GERD (gastroesophageal reflux disease)     H/O chronic respiratory failure     Moderate persistent asthma     Seasonal allergies     Smoking     Substance abuse ([facility] [address]        Past Surgical History:   Procedure Laterality Date    HYSTERECTOMY  1988    THORACOSCOPY  2/17/14    Lt.  VATS w/complete decortication    UPPER GASTROINTESTINAL ENDOSCOPY  10/13/2014    GE junction biopsy       CURRENT MEDICATIONS:    Current Outpatient Medications:     senna-docusate (RA P COL-RITE) 8.6-50 MG per tablet, take 2 TABLETS by mouth once daily if needed for constipation, Disp: 60 tablet, Rfl: 2    atorvastatin (LIPITOR) 20 MG tablet, take 1 tablet by mouth once daily, Disp: 30 tablet, Rfl: 3    omeprazole (PRILOSEC) 40 MG delayed release capsule, Take 1 capsule by mouth daily, Disp: 60 capsule, Rfl: 1    permethrin ([employer]S PERMETHRIN) 1 % lotion, Indications: Lice Apply lotion to saturate the hair and scalp.   Leave on for 10 minutes before rinsing off with water; remove remaining nits; may repeat in 1 wk if needed, Disp: 1 Bottle, Rfl: 1    tazarotene (AVAGE) 0.1 % cream, apply topically to affected area every evening, Disp: 30 g, Rfl: 11    fluticasone (ARNUITY ELLIPTA) 200 MCG/ACT AEPB, Inhale 1 puff into the lungs daily, Disp: 30 each, Rfl: 11    Umeclidinium Bromide (INCRUSE ELLIPTA) 62.5 MCG/INH AEPB, Inhale 1 puff into the lungs daily, Disp: 1 each, Rfl: 11    aspirin 325 MG EC tablet, Take 1 tablet by mouth daily, Disp: 42 tablet, Rfl: 0    tiZANidine (ZANAFLEX) 4 MG tablet, take 1 tablet by mouth three times a day, Disp: 30 tablet, Rfl: 0    RA ALLERGY RELIEF 10 MG tablet, take 1 tablet by mouth once daily, Disp: 30 tablet, Rfl: 0    polyethylene glycol (GLYCOLAX) powder, take 17GM (DISSOLVED IN WATER) by mouth once daily (Patient taking differently: as needed take 17GM (DISSOLVED IN WATER) by mouth once daily), Disp: 510 g, Rfl: 3    EPINEPHrine (EPIPEN 2-CRUZ) 0.3 MG/0.3ML SOAJ injection, Use as directed for allergic reaction, Disp: 1 each, Rfl: 2    albuterol sulfate HFA (VENTOLIN HFA) 108 (90 Base) MCG/ACT inhaler, Inhale 2 puffs into the lungs every 6 hours as needed for Wheezing, Disp: 1 Inhaler, Rfl: 3    Cholecalciferol (VITAMIN D3) 5000 units TABS, Take 1 tablet by mouth daily, Disp: , Rfl:     ferrous sulfate 325 (65 FE) MG tablet, take 1 tablet by mouth once daily WITH BREAKFAST, Disp: 30 tablet, Rfl: 3    TRINTELLIX 20 MG TABS tablet, take 1 tablet by mouth once daily, Disp: , Rfl: 0    ALLERGIES:   Allergies   Allergen Reactions    Sesame [Oleum Sesami] Anaphylaxis     Sesame seeds    Codeine Nausea And Vomiting       FAMILY HISTORY:       Problem Relation Age of Onset   [place] Asthma Mother     Stroke Father     Heart Attack Father     Cancer Maternal Grandmother         skin cancer    Diabetes Maternal Uncle          SOCIAL HISTORY:   Social History     Socioeconomic History    Marital status: Single     Spouse name: Not on file    Number of children: 1    Years of education: Not on file    Highest education level: Not on file   Occupational History    Occupation: Disabled   Social Needs    Financial resource strain: Not on file    Food insecurity     Worry: Not on file     Inability: Not on file   [employer] needs     Medical: Not on file     Non-medical: Not on file   Tobacco Use    Smoking status: Former Smoker     Packs/day: 1.00     Years: 34.00     Pack years: 34.00     Types: Cigarettes     Start date: 1977     Last attempt to quit: 2014     Years since quittin.6    Smokeless tobacco: Never Used   Substance and Sexual Activity    Alcohol use: Yes     Comment: quit 38 days ago after 30 plus years of heavy abuse    Drug use: No     Types: Cocaine     Comment: past hx of cocaine, marijuana, denies any IVDA    Sexual activity: Never   Lifestyle    Physical activity     Days per week: Not on file     Minutes per session: Not on file    Stress: Not on file   Relationships    Social connections     Talks on phone: Not on file     Gets together: Not on file     Attends Anabaptist service: Not on file     Active member of club or organization: Not on file     Attends meetings of clubs or organizations: Not on file     Relationship status: Not on file    Intimate partner violence     Fear of current or ex partner: Not on file     Emotionally abused: Not on file     Physically abused: Not on file     Forced sexual activity: Not on file   Other Topics Concern    Not on file   Social History Narrative    Not on file       REVIEW OF SYSTEMS: A 12-point review of systemswas 2017    EOSABS 0.12 2017    BASOSABS 0.07 2017         DIAGNOSTIC TESTING:     Ct Lung Screen [initial/annual]    Result Date: 10/16/2020  EXAMINATION: LOW DOSE SCREENING CT OF THE CHEST WITHOUT CONTRAST 10/16/2020 9:49 am TECHNIQUE: Low dose lung cancer screening CT of the chest was performed without the administration of intravenous contrast.  Multiplanar reformatted images are provided for review. Dose modulation, iterative reconstruction, and/or weight based adjustment of the mA/kV was utilized to reduce the radiation dose to as low as reasonably achievable. Field-of-view: 32 cm Dose Length Product: 35.94 mGy CTDlvol: 1.23 mGy COMPARISON: 09/10/2019 HISTORY: Screening. Patient Age: 64 y/o Number of pack years of smokin pack years If no longer smoking, number of years since cessation:  6.3 years Other symptoms:  None. Additional history: ORDERING SYSTEM PROVIDED HISTORY: History of smoking 30 or more pack years TECHNOLOGIST PROVIDED HISTORY: Age: 53 y.o. Smoking History: Social History Tobacco Use Smoking status: Former Smoker Packs/day: 1.00 Years: 34.00 Pack years: 29 Types: Cigarettes Start date: 1977 Quit date: 2014 Years since quittin.3 Smokeless tobacco: Never Used Alcohol use: Yes Comment: quit 38 days ago after 30 plus years of heavy abuse Drug use: No Types: Cocaine Comment: past hx of cocaine, marijuana, denies any IVDA Pack years: 29 Last CT lung screen: [unfilled] Is there documentation of shared decision making? ->Yes Does the patient show any signs or symptoms of lung cancer? ->No Is this the first (baseline) CT or an annual exam?->Annual Is this a low dose CT or a routine CT?->Low Dose CT Smoking Status? ->Former Smoker Date quit smoking? (must be within 15 years)->14 Smoking packs per day?->1 Years smoking?->34 Reason for Exam: Lung screening. PT is a former smoker x7 years. No trauma. Smoker x37 years at 1ppd.  Hx of COPD, asthma,  Bilateral pneumonia x7 affect. . Normal affect. Mentation normal  HEENT: PERRLA. Clear conjunctivae and sclerae. Moist oral mucosae, no lesions or ulcers. The neck is supple, without lymphadenopathy or jugular venous distension. No masses. Normal thyroid. Cardiovascular: S1 S2 RRR no rubs or murmurs. Pulmonary: clear BL. No accessory muscle usage. Abdominal Exam: Soft, NT ND, no hepato or spleno megaly, +BS, no ascites. No groin masses or lymphadenopathy. Extremities: no lower ext edema. Skin: Warm skin. No skin rash. No spider nevi palmar erythema nail dystrophy. Joint: No joint swelling or deformity. Neurological: intact sensory. DTR+. IMPRESSION: Ms. Rico Marcum is a 63 y.o. female with Vargas's esophagus. GERD. We had extensive discussion regarding natural history of Vargas's esophagus. Change timing of omeprazole to be taken half an hour before the major meal of the day. EGD with possible biopsies. Stay off alcohol and smoking. She needs repeat colonoscopy in 2021. Spent 30 minutes providing patient education and counseling. Thank you for allowing me to participate in the care of Ms. Rico Marcum. For any further questions please do not hesitate to contact me. I have reviewed and agree with the MA/LPN ROS. Note is dictated utilizing voice recognition software. Unfortunately this leads to occasional typographical errors. Please contact our office if you have any questions.     Anthony Huerta MD  [facility]  O: #[phone]

## 2020-10-21 ENCOUNTER — HOSPITAL ENCOUNTER (EMERGENCY)
Facility: CLINIC | Age: 55
Discharge: HOME | End: 2020-10-21
Attending: EMERGENCY MEDICINE
Payer: MEDICARE

## 2020-10-21 VITALS
RESPIRATION RATE: 18 BRPM | OXYGEN SATURATION: 98 % | BODY MASS INDEX: 22.5 KG/M2 | TEMPERATURE: 98.6 F | DIASTOLIC BLOOD PRESSURE: 70 MMHG | WEIGHT: 140 LBS | HEIGHT: 66 IN | HEART RATE: 76 BPM | SYSTOLIC BLOOD PRESSURE: 117 MMHG

## 2020-10-21 LAB
ABSOLUTE EOS #: 0.2 K/UL (ref 0–0.4)
ABSOLUTE IMMATURE GRANULOCYTE: NORMAL K/UL (ref 0–0.3)
ABSOLUTE LYMPH #: 1.5 K/UL (ref 1–4.8)
ABSOLUTE MONO #: 0.7 K/UL (ref 0.1–1.2)
ALBUMIN SERPL-MCNC: 4.3 G/DL (ref 3.5–5.2)
ALBUMIN/GLOBULIN RATIO: 1.7 (ref 1–2.5)
ALP BLD-CCNC: 60 U/L (ref 35–104)
ALT SERPL-CCNC: 16 U/L (ref 5–33)
ANION GAP SERPL CALCULATED.3IONS-SCNC: 9 MMOL/L (ref 9–17)
AST SERPL-CCNC: 19 U/L
BASOPHILS # BLD: 1 % (ref 0–2)
BASOPHILS ABSOLUTE: 0.1 K/UL (ref 0–0.2)
BILIRUB SERPL-MCNC: 0.2 MG/DL (ref 0.3–1.2)
BILIRUBIN DIRECT: <0.08 MG/DL
BILIRUBIN, INDIRECT: ABNORMAL MG/DL (ref 0–1)
BUN BLDV-MCNC: 12 MG/DL (ref 6–20)
BUN/CREAT BLD: NORMAL (ref 9–20)
CALCIUM SERPL-MCNC: 9.4 MG/DL (ref 8.6–10.4)
CHLORIDE BLD-SCNC: 106 MMOL/L (ref 98–107)
CO2: 25 MMOL/L (ref 20–31)
CREAT SERPL-MCNC: 0.6 MG/DL (ref 0.5–0.9)
DIFFERENTIAL TYPE: NORMAL
EOSINOPHILS RELATIVE PERCENT: 4 % (ref 1–4)
GFR AFRICAN AMERICAN: >60 ML/MIN
GFR NON-AFRICAN AMERICAN: >60 ML/MIN
GFR SERPL CREATININE-BSD FRML MDRD: NORMAL ML/MIN/{1.73_M2}
GFR SERPL CREATININE-BSD FRML MDRD: NORMAL ML/MIN/{1.73_M2}
GLOBULIN: ABNORMAL G/DL (ref 1.5–3.8)
GLUCOSE BLD-MCNC: 88 MG/DL (ref 70–99)
HCT VFR BLD CALC: 39.4 % (ref 36–46)
HEMOGLOBIN: 13 G/DL (ref 12–16)
IMMATURE GRANULOCYTES: NORMAL %
LYMPHOCYTES # BLD: 24 % (ref 24–44)
MCH RBC QN AUTO: 31 PG (ref 26–34)
MCHC RBC AUTO-ENTMCNC: 33 G/DL (ref 31–37)
MCV RBC AUTO: 93.7 FL (ref 80–100)
MONOCYTES # BLD: 11 % (ref 2–11)
NRBC AUTOMATED: NORMAL PER 100 WBC
PDW BLD-RTO: 12.5 % (ref 12.5–15.4)
PLATELET # BLD: 302 K/UL (ref 140–450)
PLATELET ESTIMATE: NORMAL
PMV BLD AUTO: 7.7 FL (ref 6–12)
POTASSIUM SERPL-SCNC: 3.9 MMOL/L (ref 3.7–5.3)
RBC # BLD: 4.21 M/UL (ref 4–5.2)
RBC # BLD: NORMAL 10*6/UL
SEG NEUTROPHILS: 60 % (ref 36–66)
SEGMENTED NEUTROPHILS ABSOLUTE COUNT: 3.6 K/UL (ref 1.8–7.7)
SODIUM BLD-SCNC: 140 MMOL/L (ref 135–144)
TOTAL PROTEIN: 6.8 G/DL (ref 6.4–8.3)
TROPONIN INTERP: NORMAL
TROPONIN T: NORMAL NG/ML
TROPONIN, HIGH SENSITIVITY: 7 NG/L (ref 0–14)
WBC # BLD: 6 K/UL (ref 3.5–11)
WBC # BLD: NORMAL 10*3/UL

## 2020-10-21 PROCEDURE — 85025 COMPLETE CBC W/AUTO DIFF WBC: CPT

## 2020-10-21 PROCEDURE — 80076 HEPATIC FUNCTION PANEL: CPT

## 2020-10-21 PROCEDURE — 93005 ELECTROCARDIOGRAM TRACING: CPT | Performed by: EMERGENCY MEDICINE

## 2020-10-21 PROCEDURE — 84484 ASSAY OF TROPONIN QUANT: CPT

## 2020-10-21 PROCEDURE — 6360000002 HC RX W HCPCS: Performed by: EMERGENCY MEDICINE

## 2020-10-21 PROCEDURE — 36415 COLL VENOUS BLD VENIPUNCTURE: CPT

## 2020-10-21 PROCEDURE — 96374 THER/PROPH/DIAG INJ IV PUSH: CPT

## 2020-10-21 PROCEDURE — 99284 EMERGENCY DEPT VISIT MOD MDM: CPT

## 2020-10-21 PROCEDURE — 80048 BASIC METABOLIC PNL TOTAL CA: CPT

## 2020-10-21 RX ORDER — ONDANSETRON 2 MG/ML
4 INJECTION INTRAMUSCULAR; INTRAVENOUS ONCE
Status: COMPLETED | OUTPATIENT
Start: 2020-10-21 | End: 2020-10-21

## 2020-10-21 RX ORDER — TRIAMCINOLONE ACETONIDE 1 MG/G
CREAM TOPICAL
Qty: 1 TUBE | Refills: 0 | Status: SHIPPED | OUTPATIENT
Start: 2020-10-21 | End: 2021-02-10

## 2020-10-21 RX ORDER — ONDANSETRON 4 MG/1
4 TABLET, FILM COATED ORAL EVERY 8 HOURS PRN
Qty: 20 TABLET | Refills: 0 | Status: ON HOLD | OUTPATIENT
Start: 2020-10-21 | End: 2021-12-27 | Stop reason: SDUPTHER

## 2020-10-21 RX ADMIN — ONDANSETRON HYDROCHLORIDE 4 MG: 2 INJECTION, SOLUTION INTRAVENOUS at 12:47

## 2020-10-21 ASSESSMENT — ENCOUNTER SYMPTOMS: NAUSEA: 1

## 2020-10-21 ASSESSMENT — PAIN SCALES - GENERAL: PAINLEVEL_OUTOF10: 6

## 2020-10-21 ASSESSMENT — PAIN DESCRIPTION - PAIN TYPE: TYPE: ACUTE PAIN

## 2020-10-21 NOTE — ED PROVIDER NOTES
[facility]  [address] [facility]  Phone: [phone]        Pt Name: Mirtha Sun  MRN: [id number]  [place] 1965  Date of evaluation: 10/21/20      CHIEF COMPLAINT     Chief Complaint   Patient presents with    Insect Bite     bee sting         HISTORY OF PRESENT ILLNESS  (Location/Symptom, Timing/Onset, Context/Setting, Quality, Duration, Modifying Factors, Severity.)    Mirtha Sun is a 63 y.o. female who presents with bee stings. The patient states that she was stung by bees on Monday she was stung on the left leg and right buttocks today she has been nauseated she has some swelling and erythema where the bee stings were at she has had previous allergic reactions but has never had went to a bee sting the last time she was stung however was 40 years ago no chest pain no shortness of breath no vomiting no abdominal pain no fever no chills no numbness no weakness      REVIEW OF SYSTEMS    (2-9 systems for level 4, 10 or more for level 5)     Review of Systems   Gastrointestinal: Positive for nausea. Skin: Positive for rash. All other systems reviewed and are negative. PAST MEDICAL HISTORY    has a past medical history of Vargas's esophagus, Bipolar disorder ([address] Chronic obstructive pulmonary disease (COPD) ([address] Depression, Dyspnea on exertion, Eating disorder, Empyema ([address] Fatigue due to depression, GERD (gastroesophageal reflux disease), H/O chronic respiratory failure, Moderate persistent asthma, Seasonal allergies, Smoking, and Substance abuse ([address] SURGICAL HISTORY      has a past surgical history that includes Hysterectomy (1988); Thoracoscopy (2/17/14); and Upper gastrointestinal endoscopy (10/13/2014).     CURRENTMEDICATIONS       Previous Medications    ALBUTEROL SULFATE HFA (VENTOLIN HFA) 108 (90 BASE) MCG/ACT INHALER    Inhale 2 puffs into the lungs every 6 hours as needed for Wheezing    ASPIRIN 325 MG EC TABLET    Take 1 tablet by mouth daily    ATORVASTATIN (LIPITOR) 20 MG TABLET    take 1 tablet by mouth once daily    CHOLECALCIFEROL (VITAMIN D3) 5000 UNITS TABS    Take 1 tablet by mouth daily    EPINEPHRINE (EPIPEN 2-CRUZ) 0.3 MG/0.3ML SOAJ INJECTION    Use as directed for allergic reaction    FERROUS SULFATE 325 (65 FE) MG TABLET    take 1 tablet by mouth once daily WITH BREAKFAST    FLUTICASONE (ARNUITY ELLIPTA) 200 MCG/ACT AEPB    Inhale 1 puff into the lungs daily    OMEPRAZOLE (PRILOSEC) 40 MG DELAYED RELEASE CAPSULE    Take 1 capsule by mouth daily    PERMETHRIN (CVS PERMETHRIN) 1 % LOTION    Indications: Lice Apply lotion to saturate the hair and scalp. Leave on for 10 minutes before rinsing off with water; remove remaining nits; may repeat in 1 wk if needed    RA ALLERGY RELIEF 10 MG TABLET    take 1 tablet by mouth once daily    SENNA-DOCUSATE (RA P COL-RITE) 8.6-50 MG PER TABLET    take 2 TABLETS by mouth once daily if needed for constipation    TAZAROTENE (AVAGE) 0.1 % CREAM    apply topically to affected area every evening    TIZANIDINE (ZANAFLEX) 4 MG TABLET    take 1 tablet by mouth three times a day    TRINTELLIX 20 MG TABS TABLET    take 1 tablet by mouth once daily    UMECLIDINIUM BROMIDE (INCRUSE ELLIPTA) 62.5 MCG/INH AEPB    Inhale 1 puff into the lungs daily       ALLERGIES     is allergic to sesame [oleum sesami] and codeine. FAMILY HISTORY     She indicated that her mother is . She indicated that her father is . She indicated that her maternal grandmother is . She indicated that her maternal uncle is . family history includes Asthma in her mother; Cancer in her maternal grandmother; Diabetes in her maternal uncle; Heart Attack in her father; Stroke in her father. SOCIAL HISTORY      reports that she quit smoking about 6 years ago. Her smoking use included cigarettes. She started smoking about 43 years ago. She has a 34.00 pack-year smoking history.  She has never used smokeless tobacco. She reports current alcohol use. She reports that she does not use drugs. PHYSICAL EXAM    (up to 7 for level 4, 8 or more for level 5)   INITIAL VITALS:  height is 5' 6\" (1.676 m) and weight is 63.5 kg (140 lb). Her oral temperature is 98.6 °F (37 °C). Her blood pressure is 117/70 and her pulse is 76. Her respiration is 18 and oxygen saturation is 98%. Physical Exam  Vitals signs and nursing note reviewed. Constitutional:       Appearance: Normal appearance. HENT:      Head: Normocephalic and atraumatic. Eyes:      Conjunctiva/sclera: Conjunctivae normal.   Neck:      Musculoskeletal: Normal range of motion and neck supple. No neck rigidity or muscular tenderness. Cardiovascular:      Rate and Rhythm: Normal rate and regular rhythm. Pulses: Normal pulses. Heart sounds: Normal heart sounds. Pulmonary:      Effort: Pulmonary effort is normal. No respiratory distress. Breath sounds: Normal breath sounds. No stridor. No wheezing, rhonchi or rales. Abdominal:      General: Bowel sounds are normal. There is no distension. Palpations: Abdomen is soft. Tenderness: There is no abdominal tenderness. There is no guarding. Musculoskeletal: Normal range of motion. Comments: Patient is noted to have 3 discrete areas of erythema to her on the left leg and one on the right buttocks where she states she was stung I did not appreciate these is overt cellulitis this appears more like this is a localized reaction to the bee sting no other rash or lesions no swelling of the lower extremities   Lymphadenopathy:      Cervical: No cervical adenopathy. Skin:     General: Skin is warm and dry. Comments: 3 insect bites as described above otherwise without further rashes or lesions   Neurological:      General: No focal deficit present. Mental Status: She is alert.          DIFFERENTIAL DIAGNOSIS/ MDM:     Even the complaint of nausea I will give the patient some Zofran we will get an EKG and basic labs    DIAGNOSTIC RESULTS     EKG: All EKG's are interpreted by the Emergency Department Physician who either signs or Co-signs this chart in the [address] a cardiologist.      Interpreted by Christopher Cee MD     Rhythm: Sinus Bradycardia  Rate: 59  Axis: -9  Ectopy: none  Conduction: normal  ST Segments: no acute change  T Waves: no acute change  Q Waves: none    Clinical Impression: Sinus bradycardia with no acute changes/normal EKG. No acute infarction/ischemia noted.           LABS:  Results for orders placed or performed during the hospital encounter of 10/21/20   CBC Auto Differential   Result Value Ref Range    WBC 6.0 3.5 - 11.0 k/uL    RBC 4.21 4.0 - 5.2 m/uL    Hemoglobin 13.0 12.0 - 16.0 g/dL    Hematocrit 39.4 36 - 46 %    MCV 93.7 80 - 100 fL    MCH 31.0 26 - 34 pg    MCHC 33.0 31 - 37 g/dL    RDW 12.5 12.5 - 15.4 %    Platelets 446 165 - 290 k/uL    MPV 7.7 6.0 - 12.0 fL    NRBC Automated NOT REPORTED per 100 WBC    Differential Type NOT REPORTED     Seg Neutrophils 60 36 - 66 %    Lymphocytes 24 24 - 44 %    Monocytes 11 2 - 11 %    Eosinophils % 4 1 - 4 %    Basophils 1 0 - 2 %    Immature Granulocytes NOT REPORTED 0 %    Segs Absolute 3.60 1.8 - 7.7 k/uL    Absolute Lymph # 1.50 1.0 - 4.8 k/uL    Absolute Mono # 0.70 0.1 - 1.2 k/uL    Absolute Eos # 0.20 0.0 - 0.4 k/uL    Basophils Absolute 0.10 0.0 - 0.2 k/uL    Absolute Immature Granulocyte NOT REPORTED 0.00 - 0.30 k/uL    WBC Morphology NOT REPORTED     RBC Morphology NOT REPORTED     Platelet Estimate NOT REPORTED    Basic Metabolic Panel   Result Value Ref Range    Glucose 88 70 - 99 mg/dL    BUN 12 6 - 20 mg/dL    CREATININE 0.60 0.50 - 0.90 mg/dL    Bun/Cre Ratio NOT REPORTED 9 - 20    Calcium 9.4 8.6 - 10.4 mg/dL    Sodium 140 135 - 144 mmol/L    Potassium 3.9 3.7 - 5.3 mmol/L    Chloride 106 98 - 107 mmol/L    CO2 25 20 - 31 mmol/L    Anion Gap 9 9 - 17 mmol/L    GFR Non-African American >60 >60 mL/min GFR African American >60 >60 mL/min    GFR Comment          GFR Staging NOT REPORTED    Troponin   Result Value Ref Range    Troponin, High Sensitivity 7 0 - 14 ng/L    Troponin T NOT REPORTED <0.03 ng/mL    Troponin Interp NOT REPORTED    Hepatic Function Panel   Result Value Ref Range    Alb 4.3 3.5 - 5.2 g/dL    Alkaline Phosphatase 60 35 - 104 U/L    ALT 16 5 - 33 U/L    AST 19 <32 U/L    Total Bilirubin 0.20 (L) 0.3 - 1.2 mg/dL    Bilirubin, Direct <0.08 <0.31 mg/dL    Bilirubin, Indirect CANNOT BE CALCULATED 0.00 - 1.00 mg/dL    Total Protein 6.8 6.4 - 8.3 g/dL    Globulin NOT REPORTED 1.5 - 3.8 g/dL    Albumin/Globulin Ratio 1.7 1.0 - 2.5   EKG 12 Lead   Result Value Ref Range    Ventricular Rate 59 BPM    Atrial Rate 59 BPM    P-R Interval 182 ms    QRS Duration 76 ms    Q-T Interval 404 ms    QTc Calculation (Bazett) 399 ms    P Axis 37 degrees    R Axis -9 degrees    T Axis 39 degrees           EMERGENCY DEPARTMENT COURSE:   Vitals:    Vitals:    10/21/20 1215   BP: 117/70   Pulse: 76   Resp: 18   Temp: 98.6 °F (37 °C)   TempSrc: Oral   SpO2: 98%   Weight: 63.5 kg (140 lb)   Height: 5' 6\" (1.676 m)     -------------------------  BP: 117/70, Temp: 98.6 °F (37 °C), Pulse: 76, Resp: 18      RE-EVALUATION:  On repeat evaluation is feeling much better she is actually symptom-free her nausea is completely resolved she has localized reactions to the bee stings so with this I will write prescriptions for Zofran and Kenalog ointment I am recommending that she return to the ER for any further nausea any lightheaded dizziness chest pain shortness of breath fever or any other concerns otherwise she is to follow-up with her family doctor within the next few days  At this time the patient is without objective evidence of an acute process requiring hospitalization or inpatient management. They have remained hemodynamically stable throughout their entire ED visit and are stable for discharge with outpatient follow-up. The patient understands that at this time there is no evidence for a more malignant underlying process, but the patient also understands that early in the process of an illness or injury, an emergency department workup can be falsely reassuring. Routine discharge counseling was given, and the patient understands that worsening, changing or persistent symptoms should prompt an immediate call or follow up with their primary physician or return to the emergency department. The importance of appropriate follow up was also discussed. I have reviewed the disposition diagnosis with the patient and or their family/guardian. I have answered their questions and given discharge instructions. They voiced understanding of these instructions and did not have any further questions or complaints. PROCEDURES:  None    FINAL IMPRESSION      1. Bee sting reaction, accidental or unintentional, initial encounter          DISPOSITION/PLAN   DISPOSITION        CONDITION ON DISPOSITION:   Improved - Stable    PATIENT REFERRED TO:  Augustine Doe MD  [facility] [address] [phone]    Call in 2 days        DISCHARGE MEDICATIONS:  New Prescriptions    ONDANSETRON (ZOFRAN) 4 MG TABLET    Take 1 tablet by mouth every 8 hours as needed for Nausea    TRIAMCINOLONE (KENALOG) 0.1 % CREAM    Apply topically 2 times daily for 1 week. (Please note that portions of this note were completed with a voicerecognition program.  Efforts were made to edit the dictations but occasionally words are mis-transcribed.)    Stoll MD, F.A.C.E.P.   Attending Emergency Medicine Physician       Carolyne Camarena MD  10/21/20 [id number]

## 2020-10-22 ENCOUNTER — CARE COORDINATION (OUTPATIENT)
Dept: CARE COORDINATION | Age: 55
End: 2020-10-22

## 2020-10-22 LAB
EKG ATRIAL RATE: 59 BPM
EKG P AXIS: 37 DEGREES
EKG P-R INTERVAL: 182 MS
EKG Q-T INTERVAL: 404 MS
EKG QRS DURATION: 76 MS
EKG QTC CALCULATION (BAZETT): 399 MS
EKG R AXIS: -9 DEGREES
EKG T AXIS: 39 DEGREES
EKG VENTRICULAR RATE: 59 BPM

## 2020-11-06 ENCOUNTER — HOSPITAL ENCOUNTER (OUTPATIENT)
Dept: PREADMISSION TESTING | Age: 55
Setting detail: SPECIMEN
Discharge: HOME | End: 2020-11-10
Payer: MEDICARE

## 2020-11-06 PROCEDURE — U0003 INFECTIOUS AGENT DETECTION BY NUCLEIC ACID (DNA OR RNA); SEVERE ACUTE RESPIRATORY SYNDROME CORONAVIRUS 2 (SARS-COV-2) (CORONAVIRUS DISEASE [COVID-19]), AMPLIFIED PROBE TECHNIQUE, MAKING USE OF HIGH THROUGHPUT TECHNOLOGIES AS DESCRIBED BY CMS-2020-01-R: HCPCS

## 2020-11-08 LAB — SARS-COV-2, NAA: NOT DETECTED

## 2020-11-10 ENCOUNTER — HOSPITAL ENCOUNTER (OUTPATIENT)
Age: 55
Setting detail: OUTPATIENT SURGERY
Discharge: HOME | End: 2020-11-10
Attending: INTERNAL MEDICINE | Admitting: INTERNAL MEDICINE
Payer: MEDICARE

## 2020-11-10 ENCOUNTER — ANESTHESIA EVENT (OUTPATIENT)
Dept: OPERATING ROOM | Age: 55
End: 2020-11-10
Payer: MEDICARE

## 2020-11-10 ENCOUNTER — ANESTHESIA (OUTPATIENT)
Dept: OPERATING ROOM | Age: 55
End: 2020-11-10
Payer: MEDICARE

## 2020-11-10 VITALS
HEART RATE: 58 BPM | TEMPERATURE: 96.8 F | HEIGHT: 66 IN | RESPIRATION RATE: 15 BRPM | SYSTOLIC BLOOD PRESSURE: 111 MMHG | OXYGEN SATURATION: 99 % | WEIGHT: 143 LBS | DIASTOLIC BLOOD PRESSURE: 74 MMHG | BODY MASS INDEX: 22.98 KG/M2

## 2020-11-10 VITALS — SYSTOLIC BLOOD PRESSURE: 98 MMHG | DIASTOLIC BLOOD PRESSURE: 61 MMHG | OXYGEN SATURATION: 98 %

## 2020-11-10 PROCEDURE — 6360000002 HC RX W HCPCS: Performed by: NURSE ANESTHETIST, CERTIFIED REGISTERED

## 2020-11-10 PROCEDURE — 2500000003 HC RX 250 WO HCPCS: Performed by: NURSE ANESTHETIST, CERTIFIED REGISTERED

## 2020-11-10 PROCEDURE — 2580000003 HC RX 258: Performed by: ANESTHESIOLOGY

## 2020-11-10 PROCEDURE — 3609012400 HC EGD TRANSORAL BIOPSY SINGLE/MULTIPLE: Performed by: INTERNAL MEDICINE

## 2020-11-10 PROCEDURE — 88305 TISSUE EXAM BY PATHOLOGIST: CPT

## 2020-11-10 PROCEDURE — 3700000000 HC ANESTHESIA ATTENDED CARE: Performed by: INTERNAL MEDICINE

## 2020-11-10 PROCEDURE — 43239 EGD BIOPSY SINGLE/MULTIPLE: CPT | Performed by: INTERNAL MEDICINE

## 2020-11-10 PROCEDURE — 7100000010 HC PHASE II RECOVERY - FIRST 15 MIN: Performed by: INTERNAL MEDICINE

## 2020-11-10 PROCEDURE — 2709999900 HC NON-CHARGEABLE SUPPLY: Performed by: INTERNAL MEDICINE

## 2020-11-10 PROCEDURE — 7100000011 HC PHASE II RECOVERY - ADDTL 15 MIN: Performed by: INTERNAL MEDICINE

## 2020-11-10 RX ORDER — SODIUM CHLORIDE 9 MG/ML
INJECTION, SOLUTION INTRAVENOUS CONTINUOUS
Status: DISCONTINUED | OUTPATIENT
Start: 2020-11-10 | End: 2020-11-10

## 2020-11-10 RX ORDER — PROPOFOL 10 MG/ML
INJECTION, EMULSION INTRAVENOUS PRN
Status: DISCONTINUED | OUTPATIENT
Start: 2020-11-10 | End: 2020-11-10 | Stop reason: SDUPTHER

## 2020-11-10 RX ORDER — LIDOCAINE HYDROCHLORIDE 10 MG/ML
1 INJECTION, SOLUTION EPIDURAL; INFILTRATION; INTRACAUDAL; PERINEURAL
Status: DISCONTINUED | OUTPATIENT
Start: 2020-11-10 | End: 2020-11-10 | Stop reason: HOSPADM

## 2020-11-10 RX ORDER — SODIUM CHLORIDE, SODIUM LACTATE, POTASSIUM CHLORIDE, CALCIUM CHLORIDE 600; 310; 30; 20 MG/100ML; MG/100ML; MG/100ML; MG/100ML
INJECTION, SOLUTION INTRAVENOUS CONTINUOUS
Status: DISCONTINUED | OUTPATIENT
Start: 2020-11-10 | End: 2020-11-10 | Stop reason: HOSPADM

## 2020-11-10 RX ORDER — LIDOCAINE HYDROCHLORIDE 20 MG/ML
INJECTION, SOLUTION EPIDURAL; INFILTRATION; INTRACAUDAL; PERINEURAL PRN
Status: DISCONTINUED | OUTPATIENT
Start: 2020-11-10 | End: 2020-11-10 | Stop reason: SDUPTHER

## 2020-11-10 RX ORDER — SODIUM CHLORIDE 0.9 % (FLUSH) 0.9 %
10 SYRINGE (ML) INJECTION EVERY 12 HOURS SCHEDULED
Status: DISCONTINUED | OUTPATIENT
Start: 2020-11-10 | End: 2020-11-10 | Stop reason: HOSPADM

## 2020-11-10 RX ORDER — SODIUM CHLORIDE 0.9 % (FLUSH) 0.9 %
10 SYRINGE (ML) INJECTION PRN
Status: DISCONTINUED | OUTPATIENT
Start: 2020-11-10 | End: 2020-11-10 | Stop reason: HOSPADM

## 2020-11-10 RX ADMIN — LIDOCAINE HYDROCHLORIDE 100 MG: 20 INJECTION, SOLUTION EPIDURAL; INFILTRATION; INTRACAUDAL; PERINEURAL at 10:29

## 2020-11-10 RX ADMIN — SODIUM CHLORIDE, POTASSIUM CHLORIDE, SODIUM LACTATE AND CALCIUM CHLORIDE: 600; 310; 30; 20 INJECTION, SOLUTION INTRAVENOUS at 10:06

## 2020-11-10 RX ADMIN — PROPOFOL 100 MG: 10 INJECTION, EMULSION INTRAVENOUS at 10:29

## 2020-11-10 ASSESSMENT — PULMONARY FUNCTION TESTS
PIF_VALUE: 0
PIF_VALUE: 1
PIF_VALUE: 0

## 2020-11-10 ASSESSMENT — PAIN SCALES - GENERAL
PAINLEVEL_OUTOF10: 0

## 2020-11-10 ASSESSMENT — PAIN - FUNCTIONAL ASSESSMENT: PAIN_FUNCTIONAL_ASSESSMENT: 0-10

## 2020-11-10 NOTE — H&P
History and Physical Update    Pt Name: Joey Shoemaker  MRN: [id number]  YOB: 1965  Date of evaluation: 11/10/2020      [x] I have reviewed the Gastroenterology Progress note by Dr Sindy Correa dated 10/20/20 in epic which meets the criteria for an Interval History and Physical note and is attached below. [x] I have examined  Joey Shoemaker  There are no changes to the patient who is scheduled for an EGD by Dr Jean-Paul Cabrera for Vargas's esophagus  The patient denies new health changes, fever, chills, wheezing, cough, increased SOB, chest pain, open sores or wounds. Last ASA 325mg 11/3/20    Vital signs: BP 99/70   Pulse 70   Temp 96.6 °F (35.9 °C) (Temporal)   Resp 18   Ht 5' 6\" (1.676 m)   Wt 143 lb (64.9 kg)   SpO2 98%   BMI 23.08 kg/m²     Allergies:  Sesame [oleum sesami] and Codeine    Medications:    Prior to Admission medications    Medication Sig Start Date End Date Taking? Authorizing Provider   triamcinolone (KENALOG) 0.1 % cream Apply topically 2 times daily for 1 week. 10/21/20  Yes Shona Matthews MD   senna-docusate (RA P COL-RITE) 8.6-50 MG per tablet take 2 TABLETS by mouth once daily if needed for constipation 9/1/20  Yes Niyah Portillo MD   atorvastatin (LIPITOR) 20 MG tablet take 1 tablet by mouth once daily 9/1/20  Yes Niyah Portillo MD   omeprazole (PRILOSEC) 40 MG delayed release capsule Take 1 capsule by mouth daily 5/26/20 11/10/20 Yes Niyah Portillo MD   permethrin ([employer] PERMETHRIN) 1 % lotion Indications: Lice Apply lotion to saturate the hair and scalp.   Leave on for 10 minutes before rinsing off with water; remove remaining nits; may repeat in 1 wk if needed 2/18/20  Yes Niyah Portillo MD   tazarotene (AVAGE) 0.1 % cream apply topically to affected area every evening 1/28/20  Yes Nasim Sarah MD   fluticasone (ARNUITY ELLIPTA) 200 MCG/ACT AEPB Inhale 1 puff into the lungs daily 1/14/20  Yes Katya Levi MD   Umeclidinium Bromide (INCRUSE ELLIPTA) 62.5 Progress Notes      Signed    Encounter Date:  10/20/2020          Related encounter: Office Visit from 10/20/2020 in [facility]             Reason for Referral:   Geovany Nath MD  [address]  [address]     No chief complaint on file. HISTORY OF PRESENT ILLNESS: Juventino Wilson is a 63 y.o. female , referred for evaluation of Vargas's esophagus. She reports intermittent heartburns. She has bulimia. She has had vomiting every 2 weeks or so. It used to be much worse before. She reports heavy alcohol use and cocaine use until 2016. She reports Vargas's esophagus by endoscopy. Most recently in 2014. She takes omeprazole daily. No blood in stool or melena. She reports normal colonoscopy in 2016. She was recommended to repeat in 5 years. She reports no family history of GI malignancy. Past Medical,Family, and Social History reviewed and does not contribute to the patient presentingcondition. Patient's PMH/PSH,SH,PSYCH Hx, MEDs, ALLERGIES, and ROS were all reviewed and updated in the appropriate sections. PAST MEDICAL HISTORY:  Past Medical History   Past Medical History:   Diagnosis Date    Vargas's esophagus       for 20 yrs on and off    Bipolar disorder ([address]       recently diagnosed and placed on meds    Chronic obstructive pulmonary disease (COPD) ([address]      Depression       On Meds    Dyspnea on exertion      Eating disorder       bulemia    Empyema ([address] 2/17/2014     left    Fatigue due to depression      GERD (gastroesophageal reflux disease)      H/O chronic respiratory failure      Moderate persistent asthma      Seasonal allergies      Smoking      Substance abuse ([address]             Past Surgical History         Past Surgical History:   Procedure Laterality Date    HYSTERECTOMY   1988    THORACOSCOPY   2/17/14     Lt.  VATS w/complete decortication    UPPER GASTROINTESTINAL ENDOSCOPY   10/13/2014     GE junction biopsy           CURRENT MEDICATIONS:  Current Medication     Current Outpatient Medications:     senna-docusate (RA P COL-RITE) 8.6-50 MG per tablet, take 2 TABLETS by mouth once daily if needed for constipation, Disp: 60 tablet, Rfl: 2    atorvastatin (LIPITOR) 20 MG tablet, take 1 tablet by mouth once daily, Disp: 30 tablet, Rfl: 3    omeprazole (PRILOSEC) 40 MG delayed release capsule, Take 1 capsule by mouth daily, Disp: 60 capsule, Rfl: 1    permethrin ([employer]S PERMETHRIN) 1 % lotion, Indications: Lice Apply lotion to saturate the hair and scalp.   Leave on for 10 minutes before rinsing off with water; remove remaining nits; may repeat in 1 wk if needed, Disp: 1 Bottle, Rfl: 1    tazarotene (AVAGE) 0.1 % cream, apply topically to affected area every evening, Disp: 30 g, Rfl: 11    fluticasone (ARNUITY ELLIPTA) 200 MCG/ACT AEPB, Inhale 1 puff into the lungs daily, Disp: 30 each, Rfl: 11    Umeclidinium Bromide (INCRUSE ELLIPTA) 62.5 MCG/INH AEPB, Inhale 1 puff into the lungs daily, Disp: 1 each, Rfl: 11    aspirin 325 MG EC tablet, Take 1 tablet by mouth daily, Disp: 42 tablet, Rfl: 0    tiZANidine (ZANAFLEX) 4 MG tablet, take 1 tablet by mouth three times a day, Disp: 30 tablet, Rfl: 0    RA ALLERGY RELIEF 10 MG tablet, take 1 tablet by mouth once daily, Disp: 30 tablet, Rfl: 0    polyethylene glycol (GLYCOLAX) powder, take 17GM (DISSOLVED IN WATER) by mouth once daily (Patient taking differently: as needed take 17GM (DISSOLVED IN WATER) by mouth once daily), Disp: 510 g, Rfl: 3    EPINEPHrine (EPIPEN 2-CRUZ) 0.3 MG/0.3ML SOAJ injection, Use as directed for allergic reaction, Disp: 1 each, Rfl: 2    albuterol sulfate HFA (VENTOLIN HFA) 108 (90 Base) MCG/ACT inhaler, Inhale 2 puffs into the lungs every 6 hours as needed for Wheezing, Disp: 1 Inhaler, Rfl: 3    Cholecalciferol (VITAMIN D3) 5000 units TABS, Take 1 tablet by mouth daily, Disp: , Rfl:   ferrous sulfate 325 (65 FE) MG tablet, take 1 tablet by mouth once daily WITH BREAKFAST, Disp: 30 tablet, Rfl: 3    TRINTELLIX 20 MG TABS tablet, take 1 tablet by mouth once daily, Disp: , Rfl: 0        ALLERGIES:         Allergies   Allergen Reactions    Sesame [Oleum Sesami] Anaphylaxis       Sesame seeds    Codeine Nausea And Vomiting         FAMILY HISTORY:   Family History             Problem Relation Age of Onset    Asthma Mother      Stroke Father      Heart Attack Father      Cancer Maternal Grandmother           skin cancer    Diabetes Maternal Uncle                SOCIAL HISTORY:   Social History               Socioeconomic History    Marital status: Single       Spouse name: Not on file    Number of children: 1    Years of education: Not on file    Highest education level: Not on file   Occupational History    Occupation: Disabled   Social Needs    Financial resource strain: Not on file    Food insecurity       Worry: Not on file       Inability: Not on file   [employer] needs       Medical: Not on file       Non-medical: Not on file   Tobacco Use    Smoking status: Former Smoker       Packs/day: 1.00       Years: 34.00       Pack years: 34.00       Types: Cigarettes       Start date: 1977       Last attempt to quit: 2014       Years since quittin.6    Smokeless tobacco: Never Used   Substance and Sexual Activity    Alcohol use: Yes       Comment: quit 38 days ago after 30 plus years of heavy abuse    Drug use: No       Types: Cocaine       Comment: past hx of cocaine, marijuana, denies any IVDA    Sexual activity: Never   Lifestyle    Physical activity       Days per week: Not on file       Minutes per session: Not on file    Stress: Not on file   Relationships    Social connections       Talks on phone: Not on file       Gets together: Not on file       Attends Spiritism service: Not on file       Active member of club or organization: Not on file Attends meetings of clubs or organizations: Not on file       Relationship status: Not on file    Intimate partner violence       Fear of current or ex partner: Not on file       Emotionally abused: Not on file       Physically abused: Not on file       Forced sexual activity: Not on file   Other Topics Concern    Not on file   Social History Narrative    Not on file           REVIEW OF SYSTEMS: A 12-point review of systemswas obtained and pertinent positives and negatives were enumerated above in the history of present illness. All other reviewed systems / symptoms were negative. Review of Systems   Constitutional: Negative for appetite change, fatigue and unexpected weight change. HENT: Negative for dental problem, postnasal drip, sinus pressure, sore throat, trouble swallowing and voice change. Eyes: Negative for visual disturbance. Respiratory: Negative for cough, choking and wheezing. Cardiovascular: Negative for chest pain, palpitations and leg swelling. Gastrointestinal: Negative for abdominal distention, abdominal pain, anal bleeding, blood in stool, constipation, diarrhea, nausea, rectal pain and vomiting. Genitourinary: Negative for difficulty urinating. Musculoskeletal: Negative for arthralgias, back pain, gait problem and myalgias. Allergic/Immunologic: Negative for environmental allergies and food allergies. Neurological: Negative for dizziness, weakness, light-headedness, numbness and headaches. Hematological: Does not bruise/bleed easily. Psychiatric/Behavioral: Negative for sleep disturbance. The patient is not nervous/anxious.                 LABORATORY DATA: Reviewed        Lab Results   Component Value Date     WBC 8.3 02/18/2020     HGB 12.8 02/18/2020     HCT 39.9 02/18/2020     MCV 94.3 02/18/2020      02/18/2020      12/11/2017     K 4.1 06/19/2018      12/11/2017     CO2 21 12/11/2017     BUN 15 12/11/2017     CREATININE 0.56 12/11/2017 LABALBU 2.4 (L) 2014     BILITOT 0.12 (L) 2014     ALKPHOS 99 2014     AST 18 2018     ALT 15 2018     INR 1.2 2014                  Lab Results   Component Value Date     RBC 4.23 2020     HGB 12.8 2020     MCV 94.3 2020     MCH 30.3 2020     MCHC 32.1 2020     RDW 12.1 2020     MPV 10.2 2020     BASOPCT 0 2017     LYMPHSABS 2.04 2017     MONOSABS 1.27 (H) 2017     NEUTROABS 13.79 (H) 2017     EOSABS 0.12 2017     BASOSABS 0.07 2017            DIAGNOSTIC TESTING:      Ct Lung Screen [initial/annual]     Result Date: 10/16/2020  EXAMINATION: LOW DOSE SCREENING CT OF THE CHEST WITHOUT CONTRAST 10/16/2020 9:49 am TECHNIQUE: Low dose lung cancer screening CT of the chest was performed without the administration of intravenous contrast.  Multiplanar reformatted images are provided for review. Dose modulation, iterative reconstruction, and/or weight based adjustment of the mA/kV was utilized to reduce the radiation dose to as low as reasonably achievable. Field-of-view: 32 cm Dose Length Product: 35.94 mGy CTDlvol: 1.23 mGy COMPARISON: 09/10/2019 HISTORY: Screening. Patient Age: 62 y/o Number of pack years of smokin pack years If no longer smoking, number of years since cessation:  6.3 years Other symptoms:  None. Additional history: ORDERING SYSTEM PROVIDED HISTORY: History of smoking 30 or more pack years TECHNOLOGIST PROVIDED HISTORY: Age: 53 y.o.  Smoking History: Social History Tobacco Use Smoking status: Former Smoker Packs/day: 1.00 Years: 34.00 Pack years: 29 Types: Cigarettes Start date: 1977 Quit date: 2014 Years since quittin.3 Smokeless tobacco: Never Used Alcohol use: Yes Comment: quit 38 days ago after 30 plus years of heavy abuse Drug use: No Types: Cocaine Comment: past hx of cocaine, marijuana, denies any IVDA Pack years: 29 Last CT lung screen: @hospitals@ Is there documentation of shared decision making? ->Yes Does the patient show any signs or symptoms of lung cancer? ->No Is this the first (baseline) CT or an annual exam?->Annual Is this a low dose CT or a routine CT?->Low Dose CT Smoking Status? ->Former Smoker Date quit smoking? (must be within 15 years)->2/28/14 Smoking packs per day?->1 Years smoking?->34 Reason for Exam: Lung screening. PT is a former smoker x7 years. No trauma. Smoker x37 years at 1ppd. Hx of COPD, asthma,  Bilateral pneumonia x7 years ago with \"lung scrapping procedure. \" No current chest complaints. FINDINGS: Mediastinum:  Right hilar calcified lymph nodes. No axillary, mediastinal, or hilar lymphadenopathy. No pericardial or pleural effusions. Trace pericardial fluid. No periaortic or mediastinal hemorrhage. Visualized thyroid gland grossly unremarkable in appearance. No axillary, mediastinal, or hilar lymphadenopathy. Lungs/Pleura:  Trachea and proximal central airways appear patent. Mild emphysema. No lobar airspace consolidation. Stable 3-4 mm pulmonary nodules in the right lower lobe and right middle lobe on image 67 and 72, series 3, unchanged from 09/10/2019. Upper Abdomen: Moderate hiatal hernia. Stable low-density lesion in the left hepatic lobe measuring 1 cm on image 83, series 2, unchanged from 09/10/2019. Calcifications throughout the spleen. Soft Tissues/Bones: Mild diffuse degenerative changes throughout the spine. 1. Stable 3-4 mm pulmonary nodules in the right lower lobe and right middle lobe. Mild emphysema. 2. Old granulomatous disease. 3. Moderate hiatal hernia. 4. Trace pericardial fluid. 5. Stable low-density lesion measuring 1 cm in the left hepatic lobe, unchanged from 09/10/2019. LUNG RADS: Per ACR Lung-RADS Version 1.1 Category 2, Benign Appearance or Behavior. Management:  Continue annual lung screening with LDCT in 12 months.  RECOMMENDATIONS: If you would like to register your patient with the [facility] [address] Gastroenterology  O: #[phone]            Revision History

## 2020-11-10 NOTE — ANESTHESIA PRE PROCEDURE
Department of Anesthesiology  Preprocedure Note       Name:  Blue Chambers   Age:  63 y.o.  :  1965                                          MRN:  [id number]         Date:  11/10/2020      Surgeon: Breanna Leon):  Aimee Barbosa MD    Procedure: Procedure(s):  EGD ESOPHAGOGASTRODUODENOSCOPY    Medications prior to admission:   Prior to Admission medications    Medication Sig Start Date End Date Taking? Authorizing Provider   ondansetron (ZOFRAN) 4 MG tablet Take 1 tablet by mouth every 8 hours as needed for Nausea 10/21/20   Carolnye Camarena MD   triamcinolone (KENALOG) 0.1 % cream Apply topically 2 times daily for 1 week. 10/21/20   Carolyne Camarena MD   senna-docusate (RA P COL-RITE) 8.6-50 MG per tablet take 2 TABLETS by mouth once daily if needed for constipation 20   Augustine Doe MD   atorvastatin (LIPITOR) 20 MG tablet take 1 tablet by mouth once daily 20   Augustine Doe MD   omeprazole (PRILOSEC) 40 MG delayed release capsule Take 1 capsule by mouth daily 5/26/20 10/20/20  Augustine Doe MD   permethrin ([employer]S PERMETHRIN) 1 % lotion Indications: Lice Apply lotion to saturate the hair and scalp.   Leave on for 10 minutes before rinsing off with water; remove remaining nits; may repeat in 1 wk if needed 20   Augustine Doe MD   tazarotene (AVAGE) 0.1 % cream apply topically to affected area every evening 20   Marnie Celis MD   fluticasone (ARNUITY ELLIPTA) 200 MCG/ACT AEPB Inhale 1 puff into the lungs daily 20   Blanca Boucher MD   Umeclidinium Bromide (INCRUSE ELLIPTA) 62.5 MCG/INH AEPB Inhale 1 puff into the lungs daily 20   Blanca Boucher MD   aspirin 325 MG EC tablet Take 1 tablet by mouth daily 8/21/19 10/20/20  Hailey Ramirez MD   tiZANidine (ZANAFLEX) 4 MG tablet take 1 tablet by mouth three times a day 19   Panchito Zamora MD   RA ALLERGY RELIEF 10 MG tablet take 1 tablet by mouth once daily 12/15/18   Ophelia Bunch MD   EPINEPHrine [facility] 2-CRUZ) 0.3 MG/0.3ML SOAJ injection Use as directed for allergic reaction 12/21/17   Raheel Coronado MD   albuterol sulfate HFA (VENTOLIN HFA) 108 (90 Base) MCG/ACT inhaler Inhale 2 puffs into the lungs every 6 hours as needed for Wheezing 10/11/17   Venita Lambert MD   Cholecalciferol (VITAMIN D3) 5000 units TABS Take 1 tablet by mouth daily    Historical Provider, MD   ferrous sulfate 325 (65 FE) MG tablet take 1 tablet by mouth once daily WITH BREAKFAST 5/22/17   Raheel Coronado MD   TRINTELLIX 20 MG TABS tablet take 1 tablet by mouth once daily 9/19/16   Historical Provider, MD       Current medications:    No current facility-administered medications for this encounter. Allergies:     Allergies   Allergen Reactions    Sesame [Oleum Sesami] Anaphylaxis     Sesame seeds    Codeine Nausea And Vomiting       Problem List:    Patient Active Problem List   Diagnosis Code    Vargas esophagus K22.70    Acne L70.9    Dental caries K02.9    Smoking F17.200    Hypoxia R09.02    Empyema ([facility]) J86.9    GERD (gastroesophageal reflux disease) K21.9    H/O chronic respiratory failure Z87.09    COPD (chronic obstructive pulmonary disease) ([facility]) J44.9    Orthostatic hypotension I95.1    Medication refill Z76.0    Seasonal allergies J30.2    Acne comedone L70.0    Bunion M21.619    Viral URI J06.9    High cholesterol E78.00    Ear fullness, right H93.8X1    Bulimia nervosa, purging type F50.2       Past Medical History:        Diagnosis Date    Vargas's esophagus     for 20 yrs on and off    B[facility] disorder [facility]     recently diagnosed and placed on meds    Chronic obstructive pulmonary disease (COPD) ([facility]     Depression     On Meds    Dyspnea on exertion     Eating disorder     bulemia    Empyema ([facility] 2/17/2014    left    Fatigue due to depression     GERD (gastroesophageal reflux disease)     H/O chronic respiratory failure     Moderate persistent asthma     Seasonal allergies     Smoking     Substance abuse [facility])        Past Surgical History:        Procedure Laterality Date    HYSTERECTOMY  1988    THORACOSCOPY  14    Lt. VATS w/complete decortication    UPPER GASTROINTESTINAL ENDOSCOPY  10/13/2014    GE junction biopsy       Social History:    Social History     Tobacco Use    Smoking status: Former Smoker     Packs/day: 1.00     Years: 34.00     Pack years: 34.00     Types: Cigarettes     Start date: 1977     Last attempt to quit: 2014     Years since quittin.7    Smokeless tobacco: Never Used   Substance Use Topics    Alcohol use: Yes     Comment: quit 38 days ago after 30 plus years of heavy abuse                                Counseling given: Not Answered      Vital Signs (Current): There were no vitals filed for this visit. BP Readings from Last 3 Encounters:   10/21/20 117/70   10/20/20 100/80   20 98/68       NPO Status:                                                                                 BMI:   Wt Readings from Last 3 Encounters:   10/21/20 140 lb (63.5 kg)   10/20/20 142 lb (64.4 kg)   20 137 lb (62.1 kg)     There is no height or weight on file to calculate BMI.    CBC:   Lab Results   Component Value Date    WBC 6.0 10/21/2020    RBC 4.21 10/21/2020    HGB 13.0 10/21/2020    HCT 39.4 10/21/2020    MCV 93.7 10/21/2020    RDW 12.5 10/21/2020     10/21/2020       CMP:   Lab Results   Component Value Date     10/21/2020    K 3.9 10/21/2020     10/21/2020    CO2 25 10/21/2020    BUN 12 10/21/2020    CREATININE 0.60 10/21/2020    GFRAA >60 10/21/2020    LABGLOM >60 10/21/2020    GLUCOSE 88 10/21/2020    PROT 6.8 10/21/2020    CALCIUM 9.4 10/21/2020    BILITOT 0.20 10/21/2020    ALKPHOS 60 10/21/2020    AST 19 10/21/2020    ALT 16 10/21/2020       POC Tests: No results for input(s): POCGLU, POCNA, POCK, POCCL, POCBUN, POCHEMO, POCHCT in the last 72 hours.     Coags:   Lab Results   Component Value Date    PROTIME 12.6 02/21/2014    INR 1.2 02/21/2014    APTT 23.9 02/16/2014       HCG (If Applicable): No results found for: PREGTESTUR, PREGSERUM, HCG, HCGQUANT     ABGs: No results found for: PHART, PO2ART, SWD9NQM, QKE9HPG, BEART, M3DUERYG     Type & Screen (If Applicable):  No results found for: LABABO, LABRH    Drug/Infectious Status (If Applicable):  Lab Results   Component Value Date    HEPCAB NONREACTIVE 05/02/2017       COVID-19 Screening (If Applicable):   Lab Results   Component Value Date    COVID19 Not Detected 11/06/2020         Anesthesia Evaluation   no history of anesthetic complications:   Airway: Mallampati: II  TM distance: >3 FB   Neck ROM: full  Mouth opening: > = 3 FB Dental:          Pulmonary:   (+) COPD:                             Cardiovascular:    (+) hyperlipidemia    (-)  angina and  TAPIA                Neuro/Psych:               GI/Hepatic/Renal:   (+) GERD:,           Endo/Other:                     Abdominal:           Vascular:                                      Anesthesia Plan      MAC     ASA 3       Induction: intravenous. Anesthetic plan and risks discussed with patient.                       Carlyle Sharpe MD   11/10/2020

## 2020-11-10 NOTE — ANESTHESIA POSTPROCEDURE EVALUATION
Department of Anesthesiology  Postprocedure Note    Patient: Yumiko Iyer  MRN: [id number]  YOB: 1965  Date of evaluation: 11/10/2020  Time:  11:12 AM     Procedure Summary     Date:  11/10/20 Room / Location:  [place] [address] / [facility] - INPATIENT    Anesthesia Start:  1024 Anesthesia Stop:  5569    Procedure:  EGD BIOPSY (N/A ) Diagnosis:  (DX BE)    Surgeon:  Corona Paiz MD Responsible Provider:  Minh Casanova MD    Anesthesia Type:  MAC ASA Status:  3          Anesthesia Type: MAC    Humaira Phase I: Humaira Score: 10    Humaira Phase II: Humaira Score: 10    Last vitals: Reviewed and per EMR flowsheets.        Anesthesia Post Evaluation    Patient location during evaluation: PACU  Complications: no

## 2020-11-10 NOTE — OP NOTE
[facility]     PROCEDURE DATE: 11/10/20    REFERRING PHYSICIAN: No ref. provider found     PRIMARY CARE PROVIDER: Niyah Portillo MD    ATTENDING PHYSICIAN: Sindy Correa MD     HISTORY: Ms. Joey Shoemaker is a 63 y.o. female who presents to the [place] [address] Endoscopy unit for upper endoscopy. The patient's clinical history is remarkable for GERD and reported Vargas's esophagus. She is currently medically stable and appropriate for the planned procedure. PREOPERATIVE DIAGNOSIS: GERD. PROCEDURES:   1) Transoral Upper Endoscopy, biopsy. POSTOPERATIVE DIAGNOSIS:     3 cm of Vargas's mucosa  ~4 cm sliding hiatal hernia     SPECIMENS: biopsy    MEDICATIONS:   MAC per anesthesia    EBL: minimal    INSTRUMENT: Olympus GIF-H190 flexible Gastroscope. PREPARATION: The nature and character of the procedure as well as risks, benefits, and alternatives were discussed with the patient and informed consent was obtained. Complications were said to include, but were not limited to: medication allergy, medication reaction, cardiovascular and respiratory problems, bleeding, perforation, infection, and/or missed diagnosis. Following arrival in the endoscopy room, the patient was placed in the left lateral decubitus position and final time-out accomplished in the presence of the nursing staff. Baseline vital signs were obtained and reviewed, and IV sedation was subsequently initiated. FINDINGS:   Esophagus: The esophagus was inspected to the Z-line. The endoscopic exam showed around 3 cm of Vargas's tongue extending from GE junction. Around 4 cm sliding antral hernia noted. Biopsies were obtained from the Vargas's mucosa. Stomach: The stomach was inspected in both forward and retroflex fashion and was appropriately distensible. The cardia, fundus, incisura, antrum and pylorus were identified via direct visualization. The endoscopic exam showed no pathology. Duodenum:  The proximal small bowel was inspected through the bulb, sweep, and second portion of the duodenum. The endoscopic exam showed no pathology. RECOMMENDATIONS:   1) Follow up with referring provider, as previously scheduled. 2) Follow up on pathology report. 3) Continue omeprazole 40 mg and aspirin. 4) Follow up with me in the office in 2 to 3 months.       Mayank Asif

## 2020-11-11 LAB — SURGICAL PATHOLOGY REPORT: NORMAL

## 2020-11-30 NOTE — TELEPHONE ENCOUNTER
Last visit: 05/26/20  Last Med refill:   Does patient have enough medication for 72 hours: Yes    Next Visit Date:  Future Appointments   Date Time Provider Zane Olive   12/1/2020  9:00 AM Madhu Montana MD [place] [id number] [facility]   2/10/2021 10:15 AM Patricia Samaniego MD  [address] Maintenance   Topic Date Due    HIV screen  07/17/1979    Shingles Vaccine (1 of 2) 07/17/2014    Breast cancer screen  07/25/2019    Flu vaccine (1) 09/01/2020    Annual Wellness Visit (AWV)  01/03/2021    Lipid screen  02/18/2021    Low dose CT lung screening  10/16/2021    DTaP/Tdap/Td vaccine (2 - Td) 04/30/2024    Colon cancer screen colonoscopy  07/14/2026    Hepatitis C screen  Completed    Hepatitis A vaccine  Aged Out    Hepatitis B vaccine  Aged Out    Hib vaccine  Aged Out    Meningococcal (ACWY) vaccine  Aged Out    Pneumococcal 0-64 years Vaccine  Aged Out       Hemoglobin A1C (%)   Date Value   04/19/2018 5.5   02/12/2014 5.7             ( goal A1C is < 7)   No results found for: LABMICR  LDL Cholesterol (mg/dL)   Date Value   02/18/2020 84   05/02/2017 152 (H)       (goal LDL is <100)   AST (U/L)   Date Value   10/21/2020 19     ALT (U/L)   Date Value   10/21/2020 16     BUN (mg/dL)   Date Value   10/21/2020 12     BP Readings from Last 3 Encounters:   11/10/20 111/74   11/10/20 98/61   10/21/20 117/70          (goal 120/80)    All Future Testing planned in [facility]  Lab Frequency Next Occurrence   YOVANA Digital Screen Bilateral [[id number]] Once 02/17/2021   ESOPHAGOSCOPY / EGD Once 10/20/2020               Patient Active Problem List:     Vargas esophagus     Acne     Dental caries     Smoking     Hypoxia     Empyema (HCC)     GERD (gastroesophageal reflux disease)     H/O chronic respiratory failure     COPD (chronic obstructive pulmonary disease) (HCC)     Orthostatic hypotension     Medication refill     Seasonal allergies     Acne comedone     Bunion     Viral URI     High cholesterol Ear fullness, right     Bulimia nervosa, purging type

## 2020-12-04 RX ORDER — ATORVASTATIN CALCIUM 20 MG/1
TABLET, FILM COATED ORAL
Qty: 120 TABLET | Refills: 3 | Status: SHIPPED | OUTPATIENT
Start: 2020-12-04 | End: 2020-12-08 | Stop reason: SDUPTHER

## 2020-12-08 ENCOUNTER — OFFICE VISIT (OUTPATIENT)
Dept: FAMILY MEDICINE CLINIC | Age: 55
End: 2020-12-08
Payer: MEDICARE

## 2020-12-08 VITALS
SYSTOLIC BLOOD PRESSURE: 109 MMHG | DIASTOLIC BLOOD PRESSURE: 77 MMHG | HEART RATE: 71 BPM | BODY MASS INDEX: 23.4 KG/M2 | WEIGHT: 145 LBS | TEMPERATURE: 97.7 F

## 2020-12-08 PROBLEM — G89.29 CHRONIC BILATERAL LOW BACK PAIN WITHOUT SCIATICA: Status: ACTIVE | Noted: 2020-12-08

## 2020-12-08 PROBLEM — K05.10 GINGIVITIS: Status: ACTIVE | Noted: 2020-12-08

## 2020-12-08 PROBLEM — K21.00 GASTROESOPHAGEAL REFLUX DISEASE WITH ESOPHAGITIS: Status: ACTIVE | Noted: 2020-12-08

## 2020-12-08 PROBLEM — M54.50 CHRONIC BILATERAL LOW BACK PAIN WITHOUT SCIATICA: Status: ACTIVE | Noted: 2020-12-08

## 2020-12-08 PROCEDURE — G8482 FLU IMMUNIZE ORDER/ADMIN: HCPCS | Performed by: STUDENT IN AN ORGANIZED HEALTH CARE EDUCATION/TRAINING PROGRAM

## 2020-12-08 PROCEDURE — 99213 OFFICE O/P EST LOW 20 MIN: CPT | Performed by: STUDENT IN AN ORGANIZED HEALTH CARE EDUCATION/TRAINING PROGRAM

## 2020-12-08 PROCEDURE — 1036F TOBACCO NON-USER: CPT | Performed by: STUDENT IN AN ORGANIZED HEALTH CARE EDUCATION/TRAINING PROGRAM

## 2020-12-08 PROCEDURE — G8428 CUR MEDS NOT DOCUMENT: HCPCS | Performed by: STUDENT IN AN ORGANIZED HEALTH CARE EDUCATION/TRAINING PROGRAM

## 2020-12-08 PROCEDURE — 99211 OFF/OP EST MAY X REQ PHY/QHP: CPT | Performed by: STUDENT IN AN ORGANIZED HEALTH CARE EDUCATION/TRAINING PROGRAM

## 2020-12-08 PROCEDURE — 90686 IIV4 VACC NO PRSV 0.5 ML IM: CPT | Performed by: HOSPITALIST

## 2020-12-08 PROCEDURE — 3017F COLORECTAL CA SCREEN DOC REV: CPT | Performed by: STUDENT IN AN ORGANIZED HEALTH CARE EDUCATION/TRAINING PROGRAM

## 2020-12-08 PROCEDURE — G8420 CALC BMI NORM PARAMETERS: HCPCS | Performed by: STUDENT IN AN ORGANIZED HEALTH CARE EDUCATION/TRAINING PROGRAM

## 2020-12-08 RX ORDER — ATORVASTATIN CALCIUM 20 MG/1
20 TABLET, FILM COATED ORAL DAILY
Qty: 120 TABLET | Refills: 3 | Status: SHIPPED | OUTPATIENT
Start: 2020-12-08 | End: 2021-05-11 | Stop reason: SDUPTHER

## 2020-12-08 RX ORDER — AMOXICILLIN 500 MG/1
500 CAPSULE ORAL 2 TIMES DAILY
Qty: 14 CAPSULE | Refills: 0 | Status: SHIPPED | OUTPATIENT
Start: 2020-12-08 | End: 2020-12-15

## 2020-12-08 RX ORDER — TIZANIDINE 4 MG/1
TABLET ORAL
Qty: 30 TABLET | Refills: 0 | Status: SHIPPED | OUTPATIENT
Start: 2020-12-08 | End: 2021-05-11 | Stop reason: SDUPTHER

## 2020-12-08 RX ORDER — OMEPRAZOLE 40 MG/1
40 CAPSULE, DELAYED RELEASE ORAL DAILY
Qty: 60 CAPSULE | Refills: 3 | Status: SHIPPED | OUTPATIENT
Start: 2020-12-08 | End: 2021-05-11 | Stop reason: SDUPTHER

## 2020-12-08 RX ORDER — IBUPROFEN 800 MG/1
800 TABLET ORAL 2 TIMES DAILY PRN
Qty: 60 TABLET | Refills: 0 | Status: SHIPPED | OUTPATIENT
Start: 2020-12-08 | End: 2021-05-11 | Stop reason: SDUPTHER

## 2020-12-08 RX ORDER — AMOXICILLIN 250 MG
CAPSULE ORAL
Qty: 60 TABLET | Refills: 2 | Status: SHIPPED | OUTPATIENT
Start: 2020-12-08 | End: 2021-03-26

## 2020-12-08 ASSESSMENT — ENCOUNTER SYMPTOMS
BACK PAIN: 1
ABDOMINAL PAIN: 0
COUGH: 0
VOMITING: 0
DIARRHEA: 0
SHORTNESS OF BREATH: 0
CONSTIPATION: 0
NAUSEA: 0

## 2020-12-08 NOTE — PROGRESS NOTES
Visit Information    Have you changed or started any medications since your last visit including any over-the-counter medicines, vitamins, or herbal medicines? no   Have you stopped taking any of your medications? Is so, why? -  no  Are you having any side effects from any of your medications? - no    Have you seen any other physician or provider since your last visit?  no   Have you had any other diagnostic tests since your last visit?  no   Have you been seen in the emergency room and/or had an admission in a hospital since we last saw you?  no   Have you had your routine dental cleaning in the past 6 months?  no     Do you have an active MyChart account? If no, what is the barrier?   No:     Patient Care Team:  Braeden Bhandari MD as PCP - General (Family Medicine)  Kaelyn Barrow MD as PCP - [facility]  Yen Denise MD as Surgeon (Cardiothoracic Surgery)  Feng Caraballo MD as Consulting Physician (Pulmonology)  Catherine Pulliam RN as Nurse Navigator    Medical History Review  Past Medical, Family, and Social History reviewed and does not contribute to the patient presenting condition    Health Maintenance   Topic Date Due    HIV screen  07/17/1979    Shingles Vaccine (1 of 2) 07/17/2014    Breast cancer screen  07/25/2019    Flu vaccine (1) 09/01/2020    Annual Wellness Visit (AWV)  01/03/2021    Lipid screen  02/18/2021    Low dose CT lung screening  10/16/2021    DTaP/Tdap/Td vaccine (2 - Td) 04/30/2024    Colon cancer screen colonoscopy  07/14/2026    Hepatitis C screen  Completed    Hepatitis A vaccine  Aged Out    Hepatitis B vaccine  Aged Out    Hib vaccine  Aged Out    Meningococcal (ACWY) vaccine  Aged Out    Pneumococcal 0-64 years Vaccine  Aged Out

## 2020-12-08 NOTE — PROGRESS NOTES
Attending Physician Statement  I have discussed the care of Radha Triplett, 63 y.o. female,including pertinent history and exam findings,  with the resident Dr. Marcelo Baez MD.  History:  Chief Complaint   Patient presents with    Check-Up     patient states she been doing well     Patient is here for follow up. Past medical history is remarkable for Vargas's esophagitis, eating disorder and substance abuse. I have reviewed the key elements of the encounter with the resident. Examination was done by resident as documented in residents note. BP Readings from Last 3 Encounters:   12/08/20 109/77   11/10/20 111/74   11/10/20 98/61     /77 (Site: Left Upper Arm, Position: Sitting, Cuff Size: Medium Adult)   Pulse 71   Temp 97.7 °F (36.5 °C) (Temporal)   Wt 145 lb (65.8 kg)   BMI 23.40 kg/m²   Lab Results   Component Value Date    WBC 6.0 10/21/2020    HGB 13.0 10/21/2020    HCT 39.4 10/21/2020     10/21/2020    CHOL 172 02/18/2020    TRIG 88 02/18/2020    HDL 70 02/18/2020    ALT 16 10/21/2020    AST 19 10/21/2020     10/21/2020    K 3.9 10/21/2020     10/21/2020    CREATININE 0.60 10/21/2020    BUN 12 10/21/2020    CO2 25 10/21/2020    TSH 0.43 02/18/2020    INR 1.2 02/21/2014    LABA1C 5.5 04/19/2018     Lab Results   Component Value Date    CALCIUM 9.4 10/21/2020     Lab Results   Component Value Date    LDLCHOLESTEROL 84 02/18/2020     I agree with the assessment, plan and diagnosis of    Diagnosis Orders   1. Vargas's esophagus without dysplasia  omeprazole (PRILOSEC) 40 MG delayed release capsule   2. Gastroesophageal reflux disease with esophagitis  omeprazole (PRILOSEC) 40 MG delayed release capsule   3. Splinter in skin     4. High cholesterol  atorvastatin (LIPITOR) 20 MG tablet   5. Gingivitis  amoxicillin (AMOXIL) 500 MG capsule   6. Chronic bilateral low back pain without sciatica  tiZANidine (ZANAFLEX) 4 MG tablet    ibuprofen (ADVIL;MOTRIN) 800 MG tablet   7. Constipation, unspecified constipation type  senna-docusate (RA P COL-RITE) 8.6-50 MG per tablet   8. Encounter for screening mammogram for breast cancer  YOVANA Digital Screen Bilateral [[id number]]     I agree with  orders as documented by the resident. Recommendations:   Patient was counseled, physical exam is benign, most recent EGD with GI reviewed and colonoscopy is planned. Medications regimen refilled and health maintenance updated. Return in about 6 months (around 6/8/2021) for barretts esophagus.    (Hilda Mac ) Dr. Didier Ambrose MD

## 2020-12-08 NOTE — PROGRESS NOTES
light-headedness and headaches. Psychiatric/Behavioral: Negative for suicidal ideas. The patient is not nervous/anxious. Objective:    /77 (Site: Left Upper Arm, Position: Sitting, Cuff Size: Medium Adult)   Pulse 71   Temp 97.7 °F (36.5 °C) (Temporal)   Wt 145 lb (65.8 kg)   BMI 23.40 kg/m²    BP Readings from Last 3 Encounters:   12/08/20 109/77   11/10/20 111/74   11/10/20 98/61       Physical Exam  Vitals signs reviewed. Constitutional:       General: She is not in acute distress. Eyes:      Conjunctiva/sclera: Conjunctivae normal.   Neck:      Musculoskeletal: Neck supple. Thyroid: No thyromegaly. Cardiovascular:      Rate and Rhythm: Normal rate and regular rhythm. Heart sounds: Normal heart sounds. Pulmonary:      Effort: Pulmonary effort is normal.      Breath sounds: Normal breath sounds. Abdominal:      General: Bowel sounds are normal.      Palpations: Abdomen is soft. Musculoskeletal:         General: No tenderness. Right lower leg: No edema. Left lower leg: No edema. Skin:     General: Skin is warm and dry. Comments: Splinter noted in right middle finger    Neurological:      Mental Status: She is alert. Sensory: No sensory deficit. Motor: No weakness or abnormal muscle tone. Lab Results   Component Value Date    WBC 6.0 10/21/2020    HGB 13.0 10/21/2020    HCT 39.4 10/21/2020     10/21/2020    CHOL 172 02/18/2020    TRIG 88 02/18/2020    HDL 70 02/18/2020    ALT 16 10/21/2020    AST 19 10/21/2020     10/21/2020    K 3.9 10/21/2020     10/21/2020    CREATININE 0.60 10/21/2020    BUN 12 10/21/2020    CO2 25 10/21/2020    TSH 0.43 02/18/2020    INR 1.2 02/21/2014    LABA1C 5.5 04/19/2018     Lab Results   Component Value Date    CALCIUM 9.4 10/21/2020     Lab Results   Component Value Date    LDLCHOLESTEROL 84 02/18/2020       Assessment and Plan:    1.  Vargas's esophagus without dysplasia  -EGD last month: mouth 2 times daily as needed for Pain    amoxicillin (AMOXIL) 500 MG capsule 14 capsule 0     Sig: Take 1 capsule by mouth 2 times daily for 7 days       Medications Discontinued During This Encounter   Medication Reason    omeprazole (PRILOSEC) 40 MG delayed release capsule REORDER    atorvastatin (LIPITOR) 20 MG tablet REORDER    senna-docusate (RA P COL-RITE) 8.6-50 MG per tablet REORDER    tiZANidine (ZANAFLEX) 4 MG tablet REORDER       Laurel received counseling on the following healthy behaviors:nutrition, exercise and medication adherence    Discussed use, benefit, and side effects of prescribed medications. Barriers to medication compliance addressed. All patient questions answered. Pt voicedunderstanding. Return in about 6 months (around 6/8/2021) for barretts esophagus.

## 2020-12-08 NOTE — PATIENT INSTRUCTIONS
Thank you for letting us take care of you today. We hope all your questions were addressed. If a question was overlooked or something else comes to mind after you return home, please contact a member of your Care Team listed below. Your Care Team at [place] [address] is Team #5  Massiel Velarde MD (Faculty)  Jodee Barahona MD (Resident)  Lucas Bernard MD (Resident)  Juliette Romero MD (Resident)  JEANIE Ireland. ,DERIK BRANDON, DERIK Ortega ([facility] office)  [facility] office)  Christine President, [address] [facility] (Clinical Practice Manager)  [facility] (Clinical Pharmacist)       Office phone number: [phone]    If you need to get in right away due to illness, please be advised we have \"Same Day\" appointments available Monday-Friday. Please call us at [phone] option #3 to schedule your \"Same Day\" appointment. Vaccine Information Sheet, \"Influenza - Inactivated\"  given to Clifford Ceballos, or parent/legal guardian of  Clifford Ceballos and verbalized understanding. Patient responses:    Have you ever had a reaction to a flu vaccine? No  Do you have any current illness? No  Have you ever had Guillian Jackson Syndrome? No  Do you have a serious allergy to any of the following: Neomycin, Polymyxin, Thimerosal, eggs or egg products? No    Flu vaccine given per order. Please see immunization tab. Risks and benefits explained. Current VIS given. [unfilled]      Patient Education        Influenza (Flu) Vaccine (Inactivated or Recombinant): What You Need to Know  Why get vaccinated? Influenza vaccine can prevent influenza (flu). Flu is a contagious disease that spreads around the [place] every year, usually between October and May. Anyone can get the flu, but it is more dangerous for some people.  Infants and young children, people 72years of age and older, pregnant women, and people with certain health conditions or a weakened immune system are at greatest risk of flu information. Risks of a vaccine reaction  · Soreness, redness, and swelling where shot is given, fever, muscle aches, and headache can happen after influenza vaccine. · There may be a very small increased risk of Guillain-Barré Syndrome (GBS) after inactivated influenza vaccine (the flu shot). [employer] children who get the flu shot along with pneumococcal vaccine (PCV13), and/or DTaP vaccine at the same time might be slightly more likely to have a seizure caused by fever. Tell your health care provider if a child who is getting flu vaccine has ever had a seizure. People sometimes faint after medical procedures, including vaccination. Tell your provider if you feel dizzy or have vision changes or ringing in the ears. As with any medicine, there is a very remote chance of a vaccine causing a severe allergic reaction, other serious injury, or death. What if there is a serious problem? An allergic reaction could occur after the vaccinated person leaves the clinic. If you see signs of a severe allergic reaction (hives, swelling of the face and throat, difficulty breathing, a fast heartbeat, dizziness, or weakness), call 9-1-1 and get the person to the nearest hospital.  For other signs that concern you, call your health care provider. Adverse reactions should be reported to the Vaccine Adverse Event Reporting System (VAERS). Your health care provider will usually file this report, or you can do it yourself. Visit the VAERS website at www.vaers. hhs.gov or call 9-976.806.8223. VAERS is only for reporting reactions, and VAERS staff do not give medical advice. The National Vaccine Injury Compensation Program  The National Vaccine Injury Compensation Program (VICP) is a federal program that was created to compensate people who may have been injured by certain vaccines. Visit the VICP website at www.hrsa.gov/vaccinecompensation or call 3-317.115.3752 to learn about the program and about filing a claim.  There is a time limit to file a claim for compensation. How can I learn more? · Ask your healthcare provider. · Call your local or state health department. · Contact the Centers for Disease Control and Prevention (CDC):  ? Call 7-149.694.3313 (1-800-CDC-INFO) or  ? Visit CDC's website at www.cdc.gov/flu  Vaccine Information Statement (Interim)  Inactivated Influenza Vaccine  8/15/2019  42 SARAH Alvarez [id number]  Department of Health and Human Services  Centers for Disease Control and Prevention  Many Vaccine Information Statements are available in [place] and other languages. See www.immunize.org/vis. Muchas hojas de información sobre vacunas están disponibles en español y en otros idiomas. Visite www.immunize.org/vis. Care instructions adapted under license by [facility] ([facility]). If you have questions about a medical condition or this instruction, always ask your healthcare professional. [place] [address] any warranty or liability for your use of this information.

## 2020-12-14 ENCOUNTER — OFFICE VISIT (OUTPATIENT)
Dept: GASTROENTEROLOGY | Age: 55
End: 2020-12-14
Payer: MEDICARE

## 2020-12-14 VITALS — WEIGHT: 146 LBS | BODY MASS INDEX: 23.57 KG/M2 | TEMPERATURE: 97.5 F

## 2020-12-14 PROCEDURE — 1036F TOBACCO NON-USER: CPT | Performed by: INTERNAL MEDICINE

## 2020-12-14 PROCEDURE — 3017F COLORECTAL CA SCREEN DOC REV: CPT | Performed by: INTERNAL MEDICINE

## 2020-12-14 PROCEDURE — G8420 CALC BMI NORM PARAMETERS: HCPCS | Performed by: INTERNAL MEDICINE

## 2020-12-14 PROCEDURE — 99213 OFFICE O/P EST LOW 20 MIN: CPT | Performed by: INTERNAL MEDICINE

## 2020-12-14 PROCEDURE — G8482 FLU IMMUNIZE ORDER/ADMIN: HCPCS | Performed by: INTERNAL MEDICINE

## 2020-12-14 PROCEDURE — G8427 DOCREV CUR MEDS BY ELIG CLIN: HCPCS | Performed by: INTERNAL MEDICINE

## 2020-12-14 ASSESSMENT — ENCOUNTER SYMPTOMS
RECTAL PAIN: 0
VOMITING: 0
WHEEZING: 0
ANAL BLEEDING: 0
ABDOMINAL DISTENTION: 0
CHOKING: 0
BACK PAIN: 0
GASTROINTESTINAL NEGATIVE: 1
TROUBLE SWALLOWING: 0
SORE THROAT: 0
CONSTIPATION: 0
SINUS PRESSURE: 0
RESPIRATORY NEGATIVE: 1
COUGH: 0
BLOOD IN STOOL: 0
VOICE CHANGE: 0
DIARRHEA: 0
ABDOMINAL PAIN: 0
NAUSEA: 0

## 2020-12-14 NOTE — PROGRESS NOTES
GI CLINIC FOLLOW UP    INTERVAL HISTORY:   No referring provider defined for this encounter. Chief Complaint   Patient presents with    Gastroesophageal Reflux      Vargas's, patienbt says she is doing well. Omeprazole working well. HISTORY OF PRESENT ILLNESS: Juventino Wilson is a 63 y.o. female with GERD and Varags's esophagus. She reports no symptoms. No abdominal pain. No nausea or vomiting. EGD showed 3 cm of Vargas's mucosa. No dysplasia by biopsy. She takes aspirin 325 mg/day. She reports no prior cardiovascular events. She was just taking the medication for primary prophylaxis. Past Medical,Family, and Social History reviewed and does not contribute to the patient presentingcondition. Patient's PMH/PSH,SH,PSYCH Hx, MEDs, ALLERGIES, and ROS were all reviewed and updated in the appropriate sections. PAST MEDICAL HISTORY:  Past Medical History:   Diagnosis Date    Vargas's esophagus     for 20 yrs on and off    Bipolar disorder ([address]     recently diagnosed and placed on meds    [facility] obstructive pulmonary disease ([facility]D) [facility]     Depression     On Meds    Dyspnea on exertion     Eating disorder     bulemia    Empyema ([address] 2/17/2014    left    Fatigue due to depression     GERD (gastroesophageal reflux disease)     H/O chronic respiratory failure     Moderate persistent asthma     Seasonal allergies     Smoking     Substance abuse ([address]        Past Surgical History:   Procedure Laterality Date    ENDOSCOPY, COLON, DIAGNOSTIC      HYSTERECTOMY  1988    THORACOSCOPY  2/17/14    Lt.  VATS w/complete decortication    UPPER GASTROINTESTINAL ENDOSCOPY  10/13/2014    GE junction biopsy    UPPER GASTROINTESTINAL ENDOSCOPY N/A 11/10/2020    EGD BIOPSY performed by Ashley Puentes MD at [address]:    Current Outpatient Medications:     omeprazole (PRILOSEC) 40 MG delayed release capsule, Take 1 capsule by mouth daily, Disp: 60 capsule, Rfl: 3    atorvastatin (LIPITOR) 20 MG tablet, Take 1 tablet by mouth daily, Disp: 120 tablet, Rfl: 3    senna-docusate (RA P COL-RITE) 8.6-50 MG per tablet, take 2 TABLETS by mouth once daily if needed for constipation, Disp: 60 tablet, Rfl: 2    tiZANidine (ZANAFLEX) 4 MG tablet, take 1 tablet by mouth three times a day, Disp: 30 tablet, Rfl: 0    ibuprofen (ADVIL;MOTRIN) 800 MG tablet, Take 1 tablet by mouth 2 times daily as needed for Pain, Disp: 60 tablet, Rfl: 0    amoxicillin (AMOXIL) 500 MG capsule, Take 1 capsule by mouth 2 times daily for 7 days, Disp: 14 capsule, Rfl: 0    ondansetron (ZOFRAN) 4 MG tablet, Take 1 tablet by mouth every 8 hours as needed for Nausea, Disp: 20 tablet, Rfl: 0    triamcinolone (KENALOG) 0.1 % cream, Apply topically 2 times daily for 1 week., Disp: 1 Tube, Rfl: 0    permethrin ([employer] PERMETHRIN) 1 % lotion, Indications: Lice Apply lotion to saturate the hair and scalp.   Leave on for 10 minutes before rinsing off with water; remove remaining nits; may repeat in 1 wk if needed, Disp: 1 Bottle, Rfl: 1    tazarotene (AVAGE) 0.1 % cream, apply topically to affected area every evening, Disp: 30 g, Rfl: 11    fluticasone (ARNUITY ELLIPTA) 200 MCG/ACT AEPB, Inhale 1 puff into the lungs daily, Disp: 30 each, Rfl: 11    Umeclidinium Bromide (INCRUSE ELLIPTA) 62.5 MCG/INH AEPB, Inhale 1 puff into the lungs daily, Disp: 1 each, Rfl: 11    aspirin 325 MG EC tablet, Take 1 tablet by mouth daily, Disp: 42 tablet, Rfl: 0    RA ALLERGY RELIEF 10 MG tablet, take 1 tablet by mouth once daily, Disp: 30 tablet, Rfl: 0    EPINEPHrine (EPIPEN 2-CRUZ) 0.3 MG/0.3ML SOAJ injection, Use as directed for allergic reaction, Disp: 1 each, Rfl: 2    albuterol sulfate HFA (VENTOLIN HFA) 108 (90 Base) MCG/ACT inhaler, Inhale 2 puffs into the lungs every 6 hours as needed for Wheezing, Disp: 1 Inhaler, Rfl: 3    Cholecalciferol (VITAMIN D3) 5000 units TABS, Take 1 tablet by mouth daily, Disp: , Rfl:     ferrous sulfate 325 (65 FE) MG tablet, take 1 tablet by mouth once daily WITH BREAKFAST, Disp: 30 tablet, Rfl: 3    TRINTELLIX 20 MG TABS tablet, take 1 tablet by mouth once daily, Disp: , Rfl: 0    ALLERGIES:   Allergies   Allergen Reactions    Sesame [Oleum Sesami] Anaphylaxis     Sesame seeds    Codeine Nausea And Vomiting       FAMILY HISTORY:       Problem Relation Age of Onset    Asthma Mother     Stroke Father     Heart Attack Father     Cancer Maternal Grandmother         skin cancer    Diabetes Maternal Uncle          SOCIAL HISTORY:   Social History     Socioeconomic History    Marital status: Single     Spouse name: Not on file    Number of children: 1    Years of education: Not on file    Highest education level: Not on file   Occupational History    Occupation: Disabled   Social Needs    Financial resource strain: Not on file    Food insecurity     Worry: Not on file     Inability: Not on file   [employer] needs     Medical: Not on file     Non-medical: Not on file   Tobacco Use    Smoking status: Former Smoker     Packs/day: 1.00     Years: 34.00     Pack years: 34.00     Types: Cigarettes     Start date: 1977     Quit date: 2014     Years since quittin.7    Smokeless tobacco: Never Used   Substance and Sexual Activity    Alcohol use: Yes     Comment: quit 38 days ago after 30 plus years of heavy abuse    Drug use: No     Types: Cocaine     Comment: past hx of cocaine, marijuana, denies any IVDA    Sexual activity: Never   Lifestyle    Physical activity     Days per week: Not on file     Minutes per session: Not on file    Stress: Not on file   Relationships    Social connections     Talks on phone: Not on file     Gets together: Not on file     Attends Religion service: Not on file     Active member of club or organization: Not on file     Attends meetings of clubs or organizations: Not on file     Relationship status: Not on file   Tomy Laura if you have any questions.     Valeria Salinas MD  [facility]  O: #[phone]

## 2020-12-29 ENCOUNTER — TELEPHONE (OUTPATIENT)
Dept: DERMATOLOGY | Age: 55
End: 2020-12-29

## 2020-12-29 RX ORDER — TAZAROTENE 1 MG/G
CREAM TOPICAL
Qty: 30 G | Refills: 1 | Status: SHIPPED | OUTPATIENT
Start: 2020-12-29 | End: 2021-02-10 | Stop reason: SDUPTHER

## 2021-01-20 ENCOUNTER — CARE COORDINATION (OUTPATIENT)
Dept: CARE COORDINATION | Age: 56
End: 2021-01-20

## 2021-01-25 RX ORDER — FLUTICASONE FUROATE 200 UG/1
POWDER RESPIRATORY (INHALATION)
Qty: 1 EACH | Refills: 5 | Status: SHIPPED | OUTPATIENT
Start: 2021-01-25 | End: 2021-07-26

## 2021-01-25 RX ORDER — UMECLIDINIUM 62.5 UG/1
AEROSOL, POWDER ORAL
Qty: 1 EACH | Refills: 5 | Status: SHIPPED | OUTPATIENT
Start: 2021-01-25 | End: 2021-07-26

## 2021-01-25 NOTE — TELEPHONE ENCOUNTER
Dr Aaron Rios, patient is current and is scheduled for follow up on 1/26/21. Per last dictation patient is on these medications. Please sign for refill if ok. Thank you.
No cyanosis, clubbing or edema

## 2021-01-26 ENCOUNTER — VIRTUAL VISIT (OUTPATIENT)
Dept: PULMONOLOGY | Age: 56
End: 2021-01-26
Payer: MEDICARE

## 2021-01-26 DIAGNOSIS — J44.9 CHRONIC OBSTRUCTIVE PULMONARY DISEASE, UNSPECIFIED COPD TYPE (HCC): Primary | ICD-10-CM

## 2021-01-26 DIAGNOSIS — Z87.891 HISTORY OF SMOKING AT LEAST 1 PACK PER DAY FOR AT LEAST 30 YEARS: ICD-10-CM

## 2021-01-26 DIAGNOSIS — G47.09 OTHER INSOMNIA: ICD-10-CM

## 2021-01-26 DIAGNOSIS — J45.30 MILD PERSISTENT ASTHMA WITHOUT COMPLICATION: ICD-10-CM

## 2021-01-26 PROCEDURE — 99213 OFFICE O/P EST LOW 20 MIN: CPT | Performed by: INTERNAL MEDICINE

## 2021-01-26 PROCEDURE — 3023F SPIROM DOC REV: CPT | Performed by: INTERNAL MEDICINE

## 2021-01-26 PROCEDURE — G8482 FLU IMMUNIZE ORDER/ADMIN: HCPCS | Performed by: INTERNAL MEDICINE

## 2021-01-26 PROCEDURE — 3017F COLORECTAL CA SCREEN DOC REV: CPT | Performed by: INTERNAL MEDICINE

## 2021-01-26 PROCEDURE — 1036F TOBACCO NON-USER: CPT | Performed by: INTERNAL MEDICINE

## 2021-01-26 PROCEDURE — G8420 CALC BMI NORM PARAMETERS: HCPCS | Performed by: INTERNAL MEDICINE

## 2021-01-26 PROCEDURE — G8926 SPIRO NO PERF OR DOC: HCPCS | Performed by: INTERNAL MEDICINE

## 2021-01-26 PROCEDURE — G8427 DOCREV CUR MEDS BY ELIG CLIN: HCPCS | Performed by: INTERNAL MEDICINE

## 2021-01-26 NOTE — TELEPHONE ENCOUNTER
Patient called in asking for refill. Pt states she has one more refill but due to the change in insurance she is unable to get that at this time. Patient was advised by pharmacy that a PA will need to be completed by the office. Patient emotionally upset on the phone asking if we can help her with this prior to her appt.

## 2021-01-26 NOTE — PROGRESS NOTES
PULMONARY OUTPATIENT PROGRESS NOTE    Telehealth visit  Doxy me/virtual visual visit. Patient:  Becky Yan  YOB: 1965    MRN: [id number]     Acct:        Pt seen and Chart reviewed. Mr/Ms Becky Yan is here in followup for    Diagnosis   1. Chronic obstructive pulmonary disease, unspecified COPD type ([facility] [address]    2. Mild persistent asthma without complication    3. Fatigue, unspecified type    4. Psychophysiological insomnia      This is a virtual visit for follow-up of asthmatic bronchitis/COPD, history of insomnia and chronic fatigue  Since she was seen last time 6 months ago she did not have any exacerbation no antibiotic or steroid use. She denies any change in her breathing she is able to do her regular activities. She can walk distances does not usually get shortness of breath, she think that she can go stairs 1-2 flight without usually getting shortness of breath. She does get shortness of breath when she has to walk with the mask due to pandemic and difficulty tolerating the mask on. She gets short of breath when she has to bend which she called as noisy breathing but does not complain of wheezing. She does not complain of daily or persistent cough cough is very occasional.  She denies sputum production denies any change in the color of the sputum or volume the sputum. She does have more problem when it is hot and humid environment usually better in COVID-19 less it is very dry. She is taking Arnuity and Incruse once daily. She does not require albuterol and last time she had used albuterol was long time ago. She is sleeping better and her insomnia is better. She does not complain of fatigue and fatigue is improved. When she wake up in the morning she feels fresh sleep is restorative. Does not doze off during the daytime usually remain active.   She usually take a nap in mid day about 20 to 30 minutes not more than that  She usually take 15 minutes to fall asleep after she goes to bed. She denies frequent awakening at night, no headache morning, she goes to bed around 11:00 and wake up until 6:30 in the morning. She has been followed by GI she had endoscopy done last year and she is on PPI once daily with history of esophagitis. She had a CT scan done for screening in October 2020 and on review of the CT scan 3 to 4 mm right middle lobe and right lower lobe nodule has been stable since 2019 and on reviewing the CT scan from 2016 was also present at that time. Previous history and workup  Her last pulmonary function test showed airway reversibility and normal lung volume and normal diffusion capacity and she most likely have more asthmatic bronchitis than fixed obstruction.   H/O pleural empyema and decortication in 2014, h/o asthma / COPD  H/O RML lung nodule and follow up CT did not show nodule and did not require further Ct scan  H/O smoking 37 years 1 PPD and stopped in 2014      Subjective:   Review of Systems -  General ROS: Negative for fatigue negative for weight loss fever night sweats  ENT ROS: negative for - epistaxis, headaches, nasal congestion, nasal polyps, tinnitus, vertigo or visual changes  Allergy and Immunology ROS: negative for - nasal congestion or postnasal drip  Respiratory ROS: Positive for dyspnea on severe exertion only and negative for - cough, hemoptysis, orthopnea, shortness of breath, sputum changes, tachypnea or wheezing  Cardiovascular ROS:  - Positive for intermittent dyspnea on exertion, negative for edema, irregular heartbeat, loss of consciousness, murmur, orthopnea, paroxysmal nocturnal dyspnea or rapid heart rate  Gastrointestinal ROS: no abdominal pain, change in bowel habits, or black or bloody stools  Musculoskeletal ROS: negative for - gait disturbance, joint pain or joint stiffness  CNS: negative for dizziness diplopia weakness tingling numbness syncope  Skin and lymphatics: negative for skin rash and skin lesion  Hem/onc: negative for petechia, easy bleeding and bruising    Sleep Medicine 7/7/2020   Sitting and reading 1   Watching TV 2   Sitting, inactive in a public place (e.g. a theatre or a meeting) 0   As a passenger in a car for an hour without a break 0   Lying down to rest in the afternoon when circumstances permit 3   Sitting and talking to someone 0   Sitting quietly after a lunch without alcohol 1   In a car, while stopped for a few minutes in traffic 0   Total score 7       Allergies: Allergies   Allergen Reactions    Sesame [Oleum Sesami] Anaphylaxis     Sesame seeds    Codeine Nausea And Vomiting       Medications:  Outpatient Encounter Medications as of 1/26/2021   Medication Sig Dispense Refill    ARNUITY ELLIPTA 200 MCG/ACT AEPB inhale 1 puff by mouth and INTO THE LUNGS once daily 1 each 5    INCRUSE ELLIPTA 62.5 MCG/INH AEPB inhale 1 puff by mouth and INTO THE LUNGS once daily 1 each 5    tazarotene (AVAGE) 0.1 % cream apply topically to affected area every evening 30 g 1    omeprazole (PRILOSEC) 40 MG delayed release capsule Take 1 capsule by mouth daily 60 capsule 3    atorvastatin (LIPITOR) 20 MG tablet Take 1 tablet by mouth daily 120 tablet 3    senna-docusate (RA P COL-RITE) 8.6-50 MG per tablet take 2 TABLETS by mouth once daily if needed for constipation 60 tablet 2    tiZANidine (ZANAFLEX) 4 MG tablet take 1 tablet by mouth three times a day 30 tablet 0    ibuprofen (ADVIL;MOTRIN) 800 MG tablet Take 1 tablet by mouth 2 times daily as needed for Pain 60 tablet 0    ondansetron (ZOFRAN) 4 MG tablet Take 1 tablet by mouth every 8 hours as needed for Nausea 20 tablet 0    triamcinolone (KENALOG) 0.1 % cream Apply topically 2 times daily for 1 week. 1 Tube 0    permethrin ([employer] PERMETHRIN) 1 % lotion Indications: Lice Apply lotion to saturate the hair and scalp.   Leave on for 10 minutes before rinsing off with water; remove remaining nits; may repeat in 1 wk if needed 1 Bottle 1    RA ALLERGY RELIEF 10 MG tablet take 1 tablet by mouth once daily 30 tablet 0    EPINEPHrine (EPIPEN 2-CRUZ) 0.3 MG/0.3ML SOAJ injection Use as directed for allergic reaction 1 each 2    albuterol sulfate HFA (VENTOLIN HFA) 108 (90 Base) MCG/ACT inhaler Inhale 2 puffs into the lungs every 6 hours as needed for Wheezing 1 Inhaler 3    Cholecalciferol (VITAMIN D3) 5000 units TABS Take 1 tablet by mouth daily      ferrous sulfate 325 (65 FE) MG tablet take 1 tablet by mouth once daily WITH BREAKFAST 30 tablet 3    TRINTELLIX 20 MG TABS tablet take 1 tablet by mouth once daily  0    aspirin 325 MG EC tablet Take 1 tablet by mouth daily 42 tablet 0     No facility-administered encounter medications on file as of 1/26/2021. Objective:    Physical Exam:  Vitals: There were no vitals taken for this visit. Last 3 weights: Wt Readings from Last 3 Encounters:   12/14/20 146 lb (66.2 kg)   12/08/20 145 lb (65.8 kg)   11/10/20 143 lb (64.9 kg)     There is no height or weight on file to calculate BMI.     Physical Examination:   General appearance - alert, well appearing, and in no distress  Mental status - alert, oriented to person, place, and time  Eyes - Extraocular eye movements intact  Ears - right ear normal, left ear normal  Nose -not examined  Mouth -not examined  Neck -no abnormality seen on visual exam  Chest -no distress, retraction and no tachypnea on visual exam  Heart -not examined  Abdomen -not examined  Neurological - alert, oriented, normal speech, no focal findings or movement disorder noted  Extremities -not examined  Skin -not examined  Labs:  None    CBC:   WBC   Date Value Ref Range Status   10/21/2020 6.0 3.5 - 11.0 k/uL Final     Hemoglobin   Date Value Ref Range Status   10/21/2020 13.0 12.0 - 16.0 g/dL Final     Platelets   Date Value Ref Range Status   10/21/2020 302 140 - 450 k/uL Final     BMP:   Sodium   Date Value Ref Range Status 10/21/2020 140 135 - 144 mmol/L Final     Potassium   Date Value Ref Range Status   10/21/2020 3.9 3.7 - 5.3 mmol/L Final     Chloride   Date Value Ref Range Status   10/21/2020 106 98 - 107 mmol/L Final     CO2   Date Value Ref Range Status   10/21/2020 25 20 - 31 mmol/L Final     BUN   Date Value Ref Range Status   10/21/2020 12 6 - 20 mg/dL Final     CREATININE   Date Value Ref Range Status   10/21/2020 0.60 0.50 - 0.90 mg/dL Final   12/11/2017 0.56 0.50 - 0.90 mg/dL Final   03/14/2016 0.62 0.50 - 0.90 mg/dL Final     Glucose   Date Value Ref Range Status   10/21/2020 88 70 - 99 mg/dL Final     S. Calcium:   Calcium   Date Value Ref Range Status   10/21/2020 9.4 8.6 - 10.4 mg/dL Final     S. Ionized Calcium: No results found for: IONCA  S. Magnesium: No results found for: MG  S. Phosphorus: No results found for: PHOS  S. Glucose:   POC Glucose   Date Value Ref Range Status   02/19/2014 79 65 - 105 mg/dL Final   02/18/2014 126 (H) 65 - 105 mg/dL Final   02/17/2014 117 (H) 65 - 105 mg/dL Final     Glycosylated hemoglobin A1C:   Hemoglobin A1C   Date Value Ref Range Status   04/19/2018 5.5 % Final       Pulmonary Functions Testing Results:  01/23/17 FEV1 2.07 76%, post BD 86%(12% improvement), FVC 2.94 88%, TLC 4.65 87%, DLCO 17.67 86%    8/3/15: FEV1 1.72 63%,Post BD 83%  and 32% improvemnent, FVC PBD 3.19 88%, [id number] 93% TLC 1.70 85% , DLCO 4.12 86%    Blood Gases: No results found for: PH, PCO2, PO2, HCO3, O2SAT    Radiological reports:    CXR 8/12/2014  IMPRESSION:       No acute radiographic abnormality, or significant interval change. Mild    bibasilar subsegmental atelectasis and/or scarring. CT Scans    Low-dose CT chest 10/16/2020  1. Stable 3-4 mm pulmonary nodules in the right lower lobe and right middle   lobe.  Mild emphysema. 2. Old granulomatous disease. 3. Moderate hiatal hernia. 4. Trace pericardial fluid.    5. Stable low-density lesion measuring 1 cm in the left hepatic lobe, unchanged from 09/10/2019. Low-dose CT chest 09/10/2019  Mediastinum:  Heart size at the upper limit of normal.  Trace pericardial   effusion.  Thoracic aorta is normal caliber.  No lymphadenopathy.  Calcified   right hilar lymph nodes.  Thyroid and esophagus are unremarkable.       Lungs/Pleura:  No consolidation or pleural effusion.  Mild left apical   scarring.  Mild scarring at the lung bases.  Stable 4 mm nodules in the right   lower lobe on image 69 and right middle lobe on image 67.  Scattered   calcified granulomas. .         04/26/2016  Mild scarring changes similar to May 8, 2014 radiography. Calcified right hilar lymph nodes and right lung granulomas suggesting prior granulomatous disease. Small hiatal hernia.           Immunization History   Administered Date(s) Administered    Influenza Virus Vaccine 02/13/2014    Influenza, Sheela Solders, IM, (6 mo and older Fluzone, Flulaval, Fluarix and 3 yrs and older Afluria) 12/21/2017    Influenza, Quadv, IM, PF (6 mo and older Fluzone, Flulaval, Fluarix, and 3 yrs and older Afluria) 11/20/2018, 01/03/2020, 12/08/2020    Influenza, Triv, 3 Years and older, IM (Afluria (5 yrs and older) 11/08/2016    Pneumococcal Conjugate 13-valent ([id number]) 11/08/2016    Pneumococcal Polysaccharide ([id number]) 02/13/2014    Tdap (Boostrix, Adacel) 04/30/2014       Assessment:      1. Chronic obstructive pulmonary disease, unspecified COPD type ([facility] [address]   2. Mild persistent asthma without complication   3. Fatigue, unspecified type improved   4. Psychophysiological insomnia improve         Plan:    Continue with Incruse once daily. Continue with Arnuity. Technique of using medication discussed.   Albuterol as needed  Medications refilled for done recently  Annual Flu vaccine commended in fall  Had flu vaccine last year  Uptodate on pneumonia vaccine   Maintain an active lifestyle   As she had history of smoking for 37 years and stopped 6 years ago she will need yearly CT low dose for screening CT scan and next one will be October 2021. Regular sleep-wake cycle. Sleep hygiene discussed. Given instruction for sleep hygiene. Adequate sleep duration    RTC 6 months  Questions answered to pt's satisfaction      Please note that this chart was generated using voice recognition Dragon dictation software. Although every effort was made to ensure the accuracy of this automated transcription, some errors in transcription may have occurred. Pat Watts MD             1/26/2021, 12:02 PM     Tamie Kaur is a 63 y.o. female being evaluated by a Virtual Visit (video/telephone visit) encounter to address concerns as mentioned above. Due to this being a TeleHealth encounter (During VOOXU-36 public health emergency), evaluation of the following organ systems was limited: Vitals/Constitutional/EENT/Resp/CV/GI//MS/Neuro/Skin/Heme-Lymph-Imm. Pursuant to the emergency declaration under the [facility], 1135 waiver authority and the [employer] and [employer] Act, this Virtual Visit was conducted with patient's consent, to reduce the patient's risk of exposure to COVID-19 and provide necessary medical care. The patient has also been advised to contact this office for worsening conditions or problems, and seek emergency medical treatment and/or call 911 if deemed necessary. Patient identification was verified at the start of the visit: Yes  Total time spent for this encounter: 22 minutes  Services were provided through a video/telephone synchronous discussion virtually to substitute for in-person clinic visit. Patient and provider were located at their individual locations at home and office respectively. --Pat Watts MD on 1/26/2021 at 12:02 PM    An electronic signature was used to authenticate this note.

## 2021-02-10 ENCOUNTER — OFFICE VISIT (OUTPATIENT)
Dept: DERMATOLOGY | Age: 56
End: 2021-02-10
Payer: MEDICARE

## 2021-02-10 VITALS
HEART RATE: 71 BPM | BODY MASS INDEX: 22.85 KG/M2 | OXYGEN SATURATION: 98 % | HEIGHT: 67 IN | TEMPERATURE: 97.9 F | WEIGHT: 145.6 LBS | DIASTOLIC BLOOD PRESSURE: 73 MMHG | SYSTOLIC BLOOD PRESSURE: 111 MMHG

## 2021-02-10 DIAGNOSIS — Z79.899 ON ISOTRETINOIN THERAPY: Primary | ICD-10-CM

## 2021-02-10 DIAGNOSIS — L70.0 ACNE VULGARIS: ICD-10-CM

## 2021-02-10 PROCEDURE — G8482 FLU IMMUNIZE ORDER/ADMIN: HCPCS | Performed by: DERMATOLOGY

## 2021-02-10 PROCEDURE — 99214 OFFICE O/P EST MOD 30 MIN: CPT | Performed by: DERMATOLOGY

## 2021-02-10 PROCEDURE — 3017F COLORECTAL CA SCREEN DOC REV: CPT | Performed by: DERMATOLOGY

## 2021-02-10 PROCEDURE — G8420 CALC BMI NORM PARAMETERS: HCPCS | Performed by: DERMATOLOGY

## 2021-02-10 PROCEDURE — 1036F TOBACCO NON-USER: CPT | Performed by: DERMATOLOGY

## 2021-02-10 PROCEDURE — G8427 DOCREV CUR MEDS BY ELIG CLIN: HCPCS | Performed by: DERMATOLOGY

## 2021-02-10 RX ORDER — TAZAROTENE 1 MG/G
CREAM TOPICAL
Qty: 30 G | Refills: 1 | Status: SHIPPED | OUTPATIENT
Start: 2021-02-10 | End: 2021-03-04 | Stop reason: SDUPTHER

## 2021-02-10 NOTE — Clinical Note
Please convey to Dr. Silvia Hay at [facility] that I am considering starting isotretinoin, and ask if he has an objection to it.  I am happy to discuss with him if he'd like, can give cell #

## 2021-02-10 NOTE — PROGRESS NOTES
Dermatology Patient Note  [facility] [address]  [address]  [address]  Dept: [phone]  Dept Fax: [phone]      VISITDATE: 2/10/2021   REFERRING PROVIDER: No ref. provider found      Sade Fernandes is a 63 y.o. female  who presents today in the office for:    Follow-up (1 year follow up on acne. Everything is going great! Still using the tazorac daily)      PERTINENT HISTORY NOT LISTED ABOVE:  Patient with history of nodulocystic acne presents for f/u. Seen by Dr. 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 She takes trintellix and sees Dr. Jimenez Aguilera at [facility].  She reports two suicide attempts when she was using cocaine and drinking    CURRENT MEDICATIONS:   Current Outpatient Medications   Medication Sig Dispense Refill    tazarotene (AVAGE) 0.1 % cream apply topically to affected area every evening 30 g 1    ARNUITY ELLIPTA 200 MCG/ACT AEPB inhale 1 puff by mouth and INTO THE LUNGS once daily 1 each 5    INCRUSE ELLIPTA 62.5 MCG/INH AEPB inhale 1 puff by mouth and INTO THE LUNGS once daily 1 each 5    omeprazole (PRILOSEC) 40 MG delayed release capsule Take 1 capsule by mouth daily 60 capsule 3    atorvastatin (LIPITOR) 20 MG tablet Take 1 tablet by mouth daily 120 tablet 3    senna-docusate (RA P COL-RITE) 8.6-50 MG per tablet take 2 TABLETS by mouth once daily if needed for constipation 60 tablet 2    tiZANidine (ZANAFLEX) 4 MG tablet take 1 tablet by mouth three times a day 30 tablet 0    ibuprofen (ADVIL;MOTRIN) 800 MG tablet Take 1 Exam  Sun-exposed skin, which includes the head/face, neck, both arms, digits and/or nails was examined. Diagnoses/exam findings/medical history pertinent to this visit are listed below:    Assessment and Plan:  Assessment   1. Acne vulgaris, nodulocystic, persistent - failed several topicals and systemics  - chronic illness with progression and/or exacerbation  - patient would like to consider a repeat course of isotretinoin  - will discuss with psychiatrist first  - female of non-childbearing potential (s/p hysterectomy)  Isotretinoin Initiation  - iPLEDGE patient information/informed consent booklet and forms given to patient. Patient given adequate and ample time to review the contents. The patient signed and initialed all requisite fields as a testament to his/her understanding of the program requirements. iPLEDGE program consent forms scanned into chart.  - Goal isotretinoin dosage: 9900 mg  (66 kg * 150 mg/kg)  - Anticipated isotretinoin start date: 2/15/20  - Anticipated initial isotretinoin dose: 30 mg daily  - Patient counseled to not share the medication with anyone nor donate blood while on isotretinoin  - Encouraged liberal use of emollients and sunscreen/sun protection          - Check baseline LFTs and lipids, then after 1 month of therapy, then repeat only if increasing dose or if abnormal  - Side effects of isotretinoin discussed, including depression, elevation of LFTs, elevation of triglycerides, potential for inflammatory bowel disease, teratogenic effects (in females of childbearing potential), liver dysfunction, risk of depression, suicide, psychosis, hypertriglyceridemia (which could lead to acute pancreatitis), muscle aches and pains, severe headaches, pseudotumor cerebri, GI upset, dryness, nosebleeds, back pain. Patient understands they cannot donate blood while on isotretinoin and not to share the drug with anyone.  The patient is to avoid tetracyclines or supplements while on isotretinoin. Patient will not have cosmetic procedures, tanning, waxing, dermabrasion, peels, or lasers while on isotretinoin and for six months after completion, as these increase the risk of scarring. The patient should discontinue isotretinoin and report to the ER for vision change or headaches not relieved by OTC pain relievers. Pt is to inform all other medical providers that pt is on isotretinoin. - tazarotene (AVAGE) 0.1 % cream; apply topically to affected area every evening  Dispense: 30 g; Refill: 1    2. On isotretinoin therapy  - Hepatic Function Panel; Future  - Lipid Panel; Future          RTC 1 month    Patient Instructions   - labs today  - Continue applying tazorac until informed to start isotretinoin  - Follow up in the office in 5 weeks     Patient Education Regarding Isotretinion    Isotretinoin is a good medication for many people struggling with severe acne. Most people tolerate it well. However, isotretinoin is a medication that does have some potentially serious side effects. The most serious side effect occurs when someone who is pregnant takes isotretinoin; therefore, someone who is pregnant must never be exposed to isotretinoin. Taking isotretinoin while pregnant has a high chance of causing severe birth defects or deformities in the baby. Because of this serious effect, a national program called I-pledge was developed to closely monitor the use of Isotretinoin. Everyone started on isotretinoin, male or female, must be enrolled in the I pledge program which pledges to prevent pregnancy in those taking isotretinoin. I-pledge Counseling Reviewed: If you are a female and become pregnant on isotretinoin, there is a high likelihood that the baby will have serious birth defects. Therefore, in order to be started on isotretinoin, females must be on 2 forms of birth control. The types of birth control acceptable for isotretinoin use will be discussed with you by your physician.  It is important that you remain on the 2 forms of birth control the entire time that you are on isotretinoin and for one month after stopping isotretinoin. If you have sexual intercourse without using the 2 forms of birth control or if there is any chance that you could be pregnant, it is important that you immediately stop your isotretinoin and notify your physician. You must also have a pregnancy test monthly. While on isotretinoin, you must never share your medication with anyone else. You must also never donate your blood while on isotretinoin and for one month after. Prior to filling your prescription each month, you will need to take an online test to verify your knowledge regarding the dangers of becoming pregnant while on isotretinoin. If you are a male, you should use condoms if you are sexually active to prevent pregnancy in your partner while on isotretinoin. A small amount of the isotretinoin drug is present in the semen of men who take it, and it is important not to expose females to this isotretinoin during sexual intercourse. You must never share your medication with anyone else. You must also never donate your blood while on isotretinoin and for one month after. Other potential side effects:  Common side effects that may occur during the course of treatment include severely chapped lips, nose bleeds, eye dryness, dry skin, hair thinning, joint aches, and muscle aches. While patients are on isotretinoin, their skin may heal poorly or more slowly. Patients are also very sensitive to the sun and at risk of having severe sunburns while taking isotretinoin. Regular use of a lip balm, Vaseline, or Aquaphor to the lips is important to prevent excess chapping. Vaseline can be put in the nostrils at night to prevent nose bleeds. Facial moisturizers that are noncomedogenic (wont clog pores) can be used on the face and regular moisturizers can be used on the body.  Patients can sometimes not use their contact lenses while on isotretinoin and may need to use eye lubricants or artificial tears. Patients can sometimes experience nearsightedness when driving at night. Over the counter ibuprofen is fine to take for muscle and joint pain. Avoid Tylenol or acetaminophen. Please notify your physician if muscle or joint pain is not controlled with over the counter ibuprofen. Avoid body piercing, and elective procedures that involve cutting or lasering the skin while on isotretinoin. Avoid prolonged sun exposure and wear sunscreen and sun protective clothing to prevent sunburns especially during spring and summer months. Other rare but more serious side effects may occur on isotretinoin. These include depression or other mood disorders, increased production of spinal fluid, inflammation of the liver, inflammation of the pancreas, elevated cholesterol or triglyceride levels, and blood cell abnormalities. Although these side effects are rare and most patients do not develop them, patients on isotretinoin need to be monitored closely with monthly labs and monthly visits with your doctor. It is important to never drink alcohol while on isotretinoin as this may increase the likelihood of inflammation of the liver. It is important to be honest with us about any changes in your mood, depression, or suicidal thoughts while on isotretinion. We also need to be made aware of recurrent or severe headaches that do not respond to over the counter medications or are associated with blurry vision. The labs must be obtained after fasting, meaning no food or drink other than water for 12 hours before the test. We cannot refill your isotretinoin unless you return for your monthly appointments and have your monthly labs performed. If you have inflammatory bowel disease, taking isotretinoin can worsen your symptoms. Some people have developed inflammatory bowel disease while on isotretinoin or after stopping it.   Studies have not concluded that there is a direct causative relationship between isotretinoin and inflammatory bowel disease. Other medications:   Patients should stop all other acne medications while on isotretinoin including both oral and topical medications. Your dermatologist will review your other medications to make sure these are safe to continue while on isotretinoin. Please notify all physicians that treat you that you are on isotretinoin so that they are aware of this when prescribing new medications. Do not take a multivitamin or vitamin A supplement while on isotretinoin. This note was created with the assistance of a speech-recognition program.  Although the intention is to generate a document that actually reflects the content of the visit, no guarantees can be provided that every mistake has been identified and corrected byediting.     Electronically signed by Coleen Silver MD on 2/10/21 at 10:17 AM EST

## 2021-02-10 NOTE — PATIENT INSTRUCTIONS
- labs today  - Continue applying tazorac until informed to start isotretinoin  - Follow up in the office in 5 weeks     Patient Education Regarding Isotretinion    Isotretinoin is a good medication for many people struggling with severe acne. Most people tolerate it well. However, isotretinoin is a medication that does have some potentially serious side effects. The most serious side effect occurs when someone who is pregnant takes isotretinoin; therefore, someone who is pregnant must never be exposed to isotretinoin. Taking isotretinoin while pregnant has a high chance of causing severe birth defects or deformities in the baby. Because of this serious effect, a national program called I-pledge was developed to closely monitor the use of Isotretinoin. Everyone started on isotretinoin, male or female, must be enrolled in the I pledge program which pledges to prevent pregnancy in those taking isotretinoin. I-pledge Counseling Reviewed: If you are a female and become pregnant on isotretinoin, there is a high likelihood that the baby will have serious birth defects. Therefore, in order to be started on isotretinoin, females must be on 2 forms of birth control. The types of birth control acceptable for isotretinoin use will be discussed with you by your physician. It is important that you remain on the 2 forms of birth control the entire time that you are on isotretinoin and for one month after stopping isotretinoin. If you have sexual intercourse without using the 2 forms of birth control or if there is any chance that you could be pregnant, it is important that you immediately stop your isotretinoin and notify your physician. You must also have a pregnancy test monthly. While on isotretinoin, you must never share your medication with anyone else. You must also never donate your blood while on isotretinoin and for one month after.  Prior to filling your prescription each month, you will need to take an online test to verify your knowledge regarding the dangers of becoming pregnant while on isotretinoin. If you are a male, you should use condoms if you are sexually active to prevent pregnancy in your partner while on isotretinoin. A small amount of the isotretinoin drug is present in the semen of men who take it, and it is important not to expose females to this isotretinoin during sexual intercourse. You must never share your medication with anyone else. You must also never donate your blood while on isotretinoin and for one month after. Other potential side effects:  Common side effects that may occur during the course of treatment include severely chapped lips, nose bleeds, eye dryness, dry skin, hair thinning, joint aches, and muscle aches. While patients are on isotretinoin, their skin may heal poorly or more slowly. Patients are also very sensitive to the sun and at risk of having severe sunburns while taking isotretinoin. Regular use of a lip balm, Vaseline, or Aquaphor to the lips is important to prevent excess chapping. Vaseline can be put in the nostrils at night to prevent nose bleeds. Facial moisturizers that are noncomedogenic (wont clog pores) can be used on the face and regular moisturizers can be used on the body. Patients can sometimes not use their contact lenses while on isotretinoin and may need to use eye lubricants or artificial tears. Patients can sometimes experience nearsightedness when driving at night. Over the counter ibuprofen is fine to take for muscle and joint pain. Avoid Tylenol or acetaminophen. Please notify your physician if muscle or joint pain is not controlled with over the counter ibuprofen. Avoid body piercing, and elective procedures that involve cutting or lasering the skin while on isotretinoin. Avoid prolonged sun exposure and wear sunscreen and sun protective clothing to prevent sunburns especially during spring and summer months.   Other rare but more serious side effects may occur on isotretinoin. These include depression or other mood disorders, increased production of spinal fluid, inflammation of the liver, inflammation of the pancreas, elevated cholesterol or triglyceride levels, and blood cell abnormalities. Although these side effects are rare and most patients do not develop them, patients on isotretinoin need to be monitored closely with monthly labs and monthly visits with your doctor. It is important to never drink alcohol while on isotretinoin as this may increase the likelihood of inflammation of the liver. It is important to be honest with us about any changes in your mood, depression, or suicidal thoughts while on isotretinion. We also need to be made aware of recurrent or severe headaches that do not respond to over the counter medications or are associated with blurry vision. The labs must be obtained after fasting, meaning no food or drink other than water for 12 hours before the test. We cannot refill your isotretinoin unless you return for your monthly appointments and have your monthly labs performed. If you have inflammatory bowel disease, taking isotretinoin can worsen your symptoms. Some people have developed inflammatory bowel disease while on isotretinoin or after stopping it. Studies have not concluded that there is a direct causative relationship between isotretinoin and inflammatory bowel disease. Other medications:   Patients should stop all other acne medications while on isotretinoin including both oral and topical medications. Your dermatologist will review your other medications to make sure these are safe to continue while on isotretinoin. Please notify all physicians that treat you that you are on isotretinoin so that they are aware of this when prescribing new medications. Do not take a multivitamin or vitamin A supplement while on isotretinoin.

## 2021-02-11 ENCOUNTER — HOSPITAL ENCOUNTER (OUTPATIENT)
Age: 56
Setting detail: SPECIMEN
Discharge: HOME | End: 2021-02-11
Payer: MEDICARE

## 2021-02-11 DIAGNOSIS — Z79.899 ON ISOTRETINOIN THERAPY: ICD-10-CM

## 2021-02-11 LAB
ALBUMIN SERPL-MCNC: 4.2 G/DL (ref 3.5–5.2)
ALBUMIN/GLOBULIN RATIO: 1.8 (ref 1–2.5)
ALP BLD-CCNC: 59 U/L (ref 35–104)
ALT SERPL-CCNC: 17 U/L (ref 5–33)
AST SERPL-CCNC: 20 U/L
BILIRUB SERPL-MCNC: 0.28 MG/DL (ref 0.3–1.2)
BILIRUBIN DIRECT: <0.08 MG/DL
BILIRUBIN, INDIRECT: ABNORMAL MG/DL (ref 0–1)
CHOLESTEROL/HDL RATIO: 2.7
CHOLESTEROL: 181 MG/DL
GLOBULIN: ABNORMAL G/DL (ref 1.5–3.8)
HDLC SERPL-MCNC: 67 MG/DL
LDL CHOLESTEROL: 99 MG/DL (ref 0–130)
TOTAL PROTEIN: 6.5 G/DL (ref 6.4–8.3)
TRIGL SERPL-MCNC: 75 MG/DL
VLDLC SERPL CALC-MCNC: NORMAL MG/DL (ref 1–30)

## 2021-02-12 DIAGNOSIS — L70.0 ACNE VULGARIS: ICD-10-CM

## 2021-02-12 NOTE — TELEPHONE ENCOUNTER
Please convey to Dr. Denise Kirkpatrick at [facility] that I am considering starting isotretinoin, and ask if he has an objection to it.  I am happy to discuss with him if he'd like, can give cell #     Called x2 for Dr. Denise Kirkpatrick

## 2021-02-18 NOTE — TELEPHONE ENCOUNTER
Called again today and left another message with a  about needing Dr. Ivana Noriega to call Dr. Davy Araya

## 2021-02-19 NOTE — TELEPHONE ENCOUNTER
Called back the 419 number and it rang and never went to voicemail. I'm not sure if that is her doctor or not. I also got a No Caller ID phonecall two days ago with no voicemail left. I will wait to hear from her doctor again.  Please wait at least a week before calling the office again to follow-up

## 2021-02-19 NOTE — TELEPHONE ENCOUNTER
I got a call from a [employer] number and a voicemail that sounded like it was an accidental call (someone talking in the background, no message for me). So maybe that was them. I will call back that number later today.

## 2021-02-19 NOTE — TELEPHONE ENCOUNTER
Pt states that the Psych Dr called your personal phone yesterday to discuss accutane?  Please advise

## 2021-02-23 NOTE — TELEPHONE ENCOUNTER
[place] to [facility], also left a voicemail at [facility] for the nurse to try and get Dr. Monisha Richardson to call our office so we can get him in touch with Dr. Naldo Dugan

## 2021-02-23 NOTE — TELEPHONE ENCOUNTER
Pt called again,    Asking if we can give her a form to give to her Psych physician due to him not being in the office all the time?

## 2021-03-04 RX ORDER — TAZAROTENE 1 MG/G
CREAM TOPICAL
Qty: 30 G | Refills: 11 | Status: SHIPPED | OUTPATIENT
Start: 2021-03-04 | End: 2021-11-12

## 2021-03-04 NOTE — TELEPHONE ENCOUNTER
Spoke with pt stating that just wants to continue with the tazarotene cream instead of the Accutane. Pt asking if she can just do the yearly appts to refill her cream like she did with Dr. Shira Bustillo. Please advise.

## 2021-03-04 NOTE — TELEPHONE ENCOUNTER
Patient called asking if she should keep her appt on 3/11? Pt states \"your office seems to not be able to get me started on Accutane so should I even come in? \"    I explained to her that we have attempted to contact her phyc physician but haven't been able to get a hold of him. Pt ended call when asked to hold to check with  on thoughts about the future appt.

## 2021-03-04 NOTE — TELEPHONE ENCOUNTER
If she wants to reschedule her 3/11 appt, we can wait to reschedule once we hear from her psychiatrist. I don't necessarily need to speak with him directly, I just need a note that states he does not oppose isotretinoin treatment.  We have called several times and sent over a fax and haven't heard back

## 2021-03-04 NOTE — TELEPHONE ENCOUNTER
Future Appointments   Date Time Provider Zane Olive   3/30/2021 10:30 AM STA SCR MAMMO RM 2 [facility]AZ MAMMO STA Radiolog   7/27/2021 10:45 AM Anup Oseguera MD Resp Spec [facility]   12/9/2021 10:30 AM Christian Brand MD  derm TOP

## 2021-03-26 DIAGNOSIS — K59.00 CONSTIPATION, UNSPECIFIED CONSTIPATION TYPE: ICD-10-CM

## 2021-03-26 RX ORDER — AMOXICILLIN 250 MG
CAPSULE ORAL
Qty: 60 TABLET | Refills: 2 | Status: SHIPPED | OUTPATIENT
Start: 2021-03-26 | End: 2021-05-11 | Stop reason: SDUPTHER

## 2021-05-11 ENCOUNTER — OFFICE VISIT (OUTPATIENT)
Dept: FAMILY MEDICINE CLINIC | Age: 56
End: 2021-05-11
Payer: MEDICARE

## 2021-05-11 VITALS
OXYGEN SATURATION: 97 % | DIASTOLIC BLOOD PRESSURE: 75 MMHG | TEMPERATURE: 98.4 F | BODY MASS INDEX: 22.4 KG/M2 | WEIGHT: 143 LBS | HEART RATE: 97 BPM | SYSTOLIC BLOOD PRESSURE: 105 MMHG

## 2021-05-11 DIAGNOSIS — M54.50 CHRONIC BILATERAL LOW BACK PAIN WITHOUT SCIATICA: ICD-10-CM

## 2021-05-11 DIAGNOSIS — E78.00 HIGH CHOLESTEROL: ICD-10-CM

## 2021-05-11 DIAGNOSIS — G89.29 CHRONIC BILATERAL LOW BACK PAIN WITHOUT SCIATICA: ICD-10-CM

## 2021-05-11 DIAGNOSIS — Z13.1 SCREENING FOR DIABETES MELLITUS: ICD-10-CM

## 2021-05-11 DIAGNOSIS — Z00.00 HEALTHCARE MAINTENANCE: ICD-10-CM

## 2021-05-11 DIAGNOSIS — R35.0 URINARY FREQUENCY: Primary | ICD-10-CM

## 2021-05-11 DIAGNOSIS — K59.00 CONSTIPATION, UNSPECIFIED CONSTIPATION TYPE: ICD-10-CM

## 2021-05-11 DIAGNOSIS — K22.70 BARRETT'S ESOPHAGUS WITHOUT DYSPLASIA: ICD-10-CM

## 2021-05-11 DIAGNOSIS — H61.23 BILATERAL IMPACTED CERUMEN: ICD-10-CM

## 2021-05-11 PROBLEM — R35.89 POLYURIA: Status: ACTIVE | Noted: 2021-05-11

## 2021-05-11 LAB
BILIRUBIN, POC: NEGATIVE
BLOOD URINE, POC: NEGATIVE
CLARITY, POC: CLEAR
COLOR, POC: NORMAL
GLUCOSE URINE, POC: NEGATIVE
HBA1C MFR BLD: 5 %
KETONES, POC: NEGATIVE
LEUKOCYTE EST, POC: NEGATIVE
NITRITE, POC: NEGATIVE
PH, POC: 5.5
PROTEIN, POC: NEGATIVE
SPECIFIC GRAVITY, POC: 1.02
UROBILINOGEN, POC: 0.2

## 2021-05-11 PROCEDURE — 81003 URINALYSIS AUTO W/O SCOPE: CPT | Performed by: STUDENT IN AN ORGANIZED HEALTH CARE EDUCATION/TRAINING PROGRAM

## 2021-05-11 PROCEDURE — 1036F TOBACCO NON-USER: CPT | Performed by: STUDENT IN AN ORGANIZED HEALTH CARE EDUCATION/TRAINING PROGRAM

## 2021-05-11 PROCEDURE — 3017F COLORECTAL CA SCREEN DOC REV: CPT | Performed by: STUDENT IN AN ORGANIZED HEALTH CARE EDUCATION/TRAINING PROGRAM

## 2021-05-11 PROCEDURE — 99213 OFFICE O/P EST LOW 20 MIN: CPT | Performed by: STUDENT IN AN ORGANIZED HEALTH CARE EDUCATION/TRAINING PROGRAM

## 2021-05-11 PROCEDURE — 83036 HEMOGLOBIN GLYCOSYLATED A1C: CPT | Performed by: STUDENT IN AN ORGANIZED HEALTH CARE EDUCATION/TRAINING PROGRAM

## 2021-05-11 PROCEDURE — G8420 CALC BMI NORM PARAMETERS: HCPCS | Performed by: STUDENT IN AN ORGANIZED HEALTH CARE EDUCATION/TRAINING PROGRAM

## 2021-05-11 PROCEDURE — G8427 DOCREV CUR MEDS BY ELIG CLIN: HCPCS | Performed by: STUDENT IN AN ORGANIZED HEALTH CARE EDUCATION/TRAINING PROGRAM

## 2021-05-11 RX ORDER — IBUPROFEN 800 MG/1
800 TABLET ORAL 2 TIMES DAILY PRN
Qty: 60 TABLET | Refills: 0 | Status: SHIPPED | OUTPATIENT
Start: 2021-05-11 | End: 2021-12-13 | Stop reason: SDUPTHER

## 2021-05-11 RX ORDER — TIZANIDINE 4 MG/1
TABLET ORAL
Qty: 30 TABLET | Refills: 0 | Status: SHIPPED | OUTPATIENT
Start: 2021-05-11 | End: 2022-07-26

## 2021-05-11 RX ORDER — AMOXICILLIN 250 MG
CAPSULE ORAL
Qty: 60 TABLET | Refills: 2 | Status: SHIPPED | OUTPATIENT
Start: 2021-05-11 | End: 2021-08-23 | Stop reason: SDUPTHER

## 2021-05-11 RX ORDER — ASPIRIN 81 MG
5 TABLET, DELAYED RELEASE (ENTERIC COATED) ORAL 2 TIMES DAILY
Qty: 1 BOTTLE | Refills: 3 | Status: SHIPPED | OUTPATIENT
Start: 2021-05-11 | End: 2021-06-10

## 2021-05-11 RX ORDER — ATORVASTATIN CALCIUM 20 MG/1
20 TABLET, FILM COATED ORAL DAILY
Qty: 120 TABLET | Refills: 3 | Status: SHIPPED | OUTPATIENT
Start: 2021-05-11 | End: 2021-09-02 | Stop reason: SDUPTHER

## 2021-05-11 RX ORDER — OMEPRAZOLE 40 MG/1
40 CAPSULE, DELAYED RELEASE ORAL DAILY
Qty: 60 CAPSULE | Refills: 3 | Status: SHIPPED | OUTPATIENT
Start: 2021-05-11 | End: 2021-09-02 | Stop reason: SDUPTHER

## 2021-05-11 ASSESSMENT — PATIENT HEALTH QUESTIONNAIRE - PHQ9
SUM OF ALL RESPONSES TO PHQ QUESTIONS 1-9: 0
SUM OF ALL RESPONSES TO PHQ9 QUESTIONS 1 & 2: 0
SUM OF ALL RESPONSES TO PHQ QUESTIONS 1-9: 0
2. FEELING DOWN, DEPRESSED OR HOPELESS: 0
SUM OF ALL RESPONSES TO PHQ QUESTIONS 1-9: 0
1. LITTLE INTEREST OR PLEASURE IN DOING THINGS: 0

## 2021-05-11 ASSESSMENT — ENCOUNTER SYMPTOMS
ABDOMINAL PAIN: 0
CONSTIPATION: 0
SHORTNESS OF BREATH: 0
DIARRHEA: 0
COUGH: 0
VOMITING: 0
NAUSEA: 0

## 2021-05-11 NOTE — PROGRESS NOTES
Visit Information    Have you changed or started any medications since your last visit including any over-the-counter medicines, vitamins, or herbal medicines? no   Are you having any side effects from any of your medications? -  no  Have you stopped taking any of your medications? Is so, why? -  no    Have you seen any other physician or provider since your last visit? Yes - Records Obtained  Have you had any other diagnostic tests since your last visit? Yes - Records Obtained  Have you been seen in the emergency room and/or had an admission to a hospital since we last saw you? No  Have you had your routine dental cleaning in the past 6 months? no    Have you activated your [employer] account? If not, what are your barriers?  No     Patient Care Team:  Jaylon Amin MD as PCP - General (Family Medicine)  Tyler Yee MD as PCP - [facility]  Lisa Campbell MD as Surgeon (Cardiothoracic Surgery)  Sarina Osler, MD as Consulting Physician (Pulmonology)  [address] Katelyn Pulliam RN as Nurse Navigator    Medical History Review  Past Medical, Family, and Social History reviewed and does contribute to the patient presenting condition    Health Maintenance   Topic Date Due    HIV screen  Never done    Shingles Vaccine (1 of 2) Never done    Breast cancer screen  07/25/2019    Annual Wellness Visit (AWV)  Never done    Low dose CT lung screening  10/16/2021    Lipid screen  02/11/2022    DTaP/Tdap/Td vaccine (2 - Td) 04/30/2024    Colon cancer screen colonoscopy  07/14/2026    Flu vaccine  Completed    Pneumococcal 0-64 years Vaccine  Completed    COVID-19 Vaccine  Completed    Hepatitis C screen  Completed    Hepatitis A vaccine  Aged Out    Hepatitis B vaccine  Aged Out    Hib vaccine  Aged Out    Meningococcal (ACWY) vaccine  Aged Out

## 2021-05-12 ENCOUNTER — HOSPITAL ENCOUNTER (OUTPATIENT)
Age: 56
Setting detail: SPECIMEN
Discharge: HOME | End: 2021-05-12
Payer: MEDICARE

## 2021-05-12 DIAGNOSIS — R35.0 URINARY FREQUENCY: ICD-10-CM

## 2021-05-12 DIAGNOSIS — Z00.00 HEALTHCARE MAINTENANCE: ICD-10-CM

## 2021-05-12 LAB
ANION GAP SERPL CALCULATED.3IONS-SCNC: 15 MMOL/L (ref 9–17)
BUN BLDV-MCNC: 11 MG/DL (ref 6–20)
BUN/CREAT BLD: ABNORMAL (ref 9–20)
CALCIUM SERPL-MCNC: 9.6 MG/DL (ref 8.6–10.4)
CHLORIDE BLD-SCNC: 105 MMOL/L (ref 98–107)
CO2: 24 MMOL/L (ref 20–31)
CREAT SERPL-MCNC: 0.64 MG/DL (ref 0.5–0.9)
GFR AFRICAN AMERICAN: >60 ML/MIN
GFR NON-AFRICAN AMERICAN: >60 ML/MIN
GFR SERPL CREATININE-BSD FRML MDRD: ABNORMAL ML/MIN/{1.73_M2}
GFR SERPL CREATININE-BSD FRML MDRD: ABNORMAL ML/MIN/{1.73_M2}
GLUCOSE BLD-MCNC: 52 MG/DL (ref 70–99)
HIV AG/AB: NONREACTIVE
POTASSIUM SERPL-SCNC: 4 MMOL/L (ref 3.7–5.3)
SODIUM BLD-SCNC: 144 MMOL/L (ref 135–144)

## 2021-06-20 ENCOUNTER — HOSPITAL ENCOUNTER (EMERGENCY)
Facility: CLINIC | Age: 56
Discharge: HOME | End: 2021-06-20
Attending: EMERGENCY MEDICINE
Payer: MEDICARE

## 2021-06-20 VITALS
BODY MASS INDEX: 21.97 KG/M2 | OXYGEN SATURATION: 97 % | HEART RATE: 83 BPM | HEIGHT: 67 IN | WEIGHT: 140 LBS | TEMPERATURE: 99.3 F | DIASTOLIC BLOOD PRESSURE: 64 MMHG | RESPIRATION RATE: 20 BRPM | SYSTOLIC BLOOD PRESSURE: 101 MMHG

## 2021-06-20 DIAGNOSIS — T78.1XXA ALLERGIC REACTION TO FOOD, INITIAL ENCOUNTER: Primary | ICD-10-CM

## 2021-06-20 PROCEDURE — 96375 TX/PRO/DX INJ NEW DRUG ADDON: CPT

## 2021-06-20 PROCEDURE — 96372 THER/PROPH/DIAG INJ SC/IM: CPT

## 2021-06-20 PROCEDURE — 96374 THER/PROPH/DIAG INJ IV PUSH: CPT

## 2021-06-20 PROCEDURE — 6360000002 HC RX W HCPCS: Performed by: EMERGENCY MEDICINE

## 2021-06-20 PROCEDURE — 2500000003 HC RX 250 WO HCPCS: Performed by: EMERGENCY MEDICINE

## 2021-06-20 PROCEDURE — 99284 EMERGENCY DEPT VISIT MOD MDM: CPT

## 2021-06-20 PROCEDURE — 2580000003 HC RX 258: Performed by: EMERGENCY MEDICINE

## 2021-06-20 RX ORDER — FAMOTIDINE 20 MG/1
20 TABLET, FILM COATED ORAL 2 TIMES DAILY
Qty: 10 TABLET | Refills: 0 | Status: SHIPPED | OUTPATIENT
Start: 2021-06-20 | End: 2022-07-26

## 2021-06-20 RX ORDER — DIPHENHYDRAMINE HYDROCHLORIDE 50 MG/ML
50 INJECTION INTRAMUSCULAR; INTRAVENOUS ONCE
Status: COMPLETED | OUTPATIENT
Start: 2021-06-20 | End: 2021-06-20

## 2021-06-20 RX ORDER — DIPHENHYDRAMINE HCL 25 MG
50 CAPSULE ORAL EVERY 6 HOURS PRN
Qty: 30 CAPSULE | Refills: 0 | Status: SHIPPED | OUTPATIENT
Start: 2021-06-20

## 2021-06-20 RX ORDER — PREDNISONE 10 MG/1
TABLET ORAL
Qty: 20 TABLET | Refills: 0 | Status: SHIPPED | OUTPATIENT
Start: 2021-06-20 | End: 2021-06-30

## 2021-06-20 RX ORDER — DEXAMETHASONE SODIUM PHOSPHATE 4 MG/ML
4 INJECTION, SOLUTION INTRA-ARTICULAR; INTRALESIONAL; INTRAMUSCULAR; INTRAVENOUS; SOFT TISSUE ONCE
Status: COMPLETED | OUTPATIENT
Start: 2021-06-20 | End: 2021-06-20

## 2021-06-20 RX ORDER — 0.9 % SODIUM CHLORIDE 0.9 %
1000 INTRAVENOUS SOLUTION INTRAVENOUS ONCE
Status: COMPLETED | OUTPATIENT
Start: 2021-06-20 | End: 2021-06-20

## 2021-06-20 RX ADMIN — DEXAMETHASONE SODIUM PHOSPHATE 4 MG: 4 INJECTION INTRA-ARTICULAR; INTRALESIONAL; INTRAMUSCULAR; INTRAVENOUS; SOFT TISSUE at 17:22

## 2021-06-20 RX ADMIN — SODIUM CHLORIDE 1000 ML: 9 INJECTION, SOLUTION INTRAVENOUS at 17:21

## 2021-06-20 RX ADMIN — EPINEPHRINE 0.3 MG: 1 INJECTION INTRAMUSCULAR; INTRAVENOUS; SUBCUTANEOUS at 17:22

## 2021-06-20 RX ADMIN — FAMOTIDINE 20 MG: 10 INJECTION, SOLUTION INTRAVENOUS at 17:22

## 2021-06-20 RX ADMIN — DIPHENHYDRAMINE HYDROCHLORIDE 50 MG: 50 INJECTION INTRAMUSCULAR; INTRAVENOUS at 17:21

## 2021-06-20 NOTE — ED NOTES
Decreased redness and swelling to face and mouth.  Pt states feeling better     Kvng Fairbanks RN  06/20/21 [id number]

## 2021-06-20 NOTE — ED PROVIDER NOTES
allergies, Smoking, and Substance abuse ([facility] [address] SURGICAL HISTORY      has a past surgical history that includes Hysterectomy (); Thoracoscopy (14); Upper gastrointestinal endoscopy (10/13/2014); Endoscopy, colon, diagnostic; and Upper gastrointestinal endoscopy (N/A, 11/10/2020). CURRENT MEDICATIONS       Previous Medications    ALBUTEROL SULFATE HFA (VENTOLIN HFA) 108 (90 BASE) MCG/ACT INHALER    Inhale 2 puffs into the lungs every 6 hours as needed for Wheezing    ARNUITY ELLIPTA 200 MCG/ACT AEPB    inhale 1 puff by mouth and INTO THE LUNGS once daily    ASPIRIN 325 MG EC TABLET    Take 1 tablet by mouth daily    ATORVASTATIN (LIPITOR) 20 MG TABLET    Take 1 tablet by mouth daily    CHOLECALCIFEROL (VITAMIN D3) 5000 UNITS TABS    Take 1 tablet by mouth daily    EPINEPHRINE (EPIPEN 2-CRUZ) 0.3 MG/0.3ML SOAJ INJECTION    Use as directed for allergic reaction    FERROUS SULFATE 325 (65 FE) MG TABLET    take 1 tablet by mouth once daily WITH BREAKFAST    IBUPROFEN (ADVIL;MOTRIN) 800 MG TABLET    Take 1 tablet by mouth 2 times daily as needed for Pain    INCRUSE ELLIPTA 62.5 MCG/INH AEPB    inhale 1 puff by mouth and INTO THE LUNGS once daily    OMEPRAZOLE (PRILOSEC) 40 MG DELAYED RELEASE CAPSULE    Take 1 capsule by mouth daily    ONDANSETRON (ZOFRAN) 4 MG TABLET    Take 1 tablet by mouth every 8 hours as needed for Nausea    RA ALLERGY RELIEF 10 MG TABLET    take 1 tablet by mouth once daily    SENNA-DOCUSATE (RA P COL-RITE) 8.6-50 MG PER TABLET    take 2 tablets by mouth once daily if needed for constipation    TAZAROTENE (AVAGE) 0.1 % CREAM    apply topically to affected area every evening    TIZANIDINE (ZANAFLEX) 4 MG TABLET    take 1 tablet by mouth three times a day    TRINTELLIX 20 MG TABS TABLET    30 mg        ALLERGIES     is allergic to sesame [oleum sesami] and codeine. FAMILY HISTORY     She indicated that her mother is . She indicated that her father is .  She indicated that her maternal grandmother is . She indicated that her maternal uncle is . family history includes Asthma in her mother; Cancer in her maternal grandmother; Diabetes in her maternal uncle; Heart Attack in her father; Stroke in her father. SOCIAL HISTORY      reports that she quit smoking about 7 years ago. Her smoking use included cigarettes. She started smoking about 44 years ago. She has a 34.00 pack-year smoking history. She has never used smokeless tobacco. She reports current alcohol use. She reports that she does not use drugs. PHYSICAL EXAM     INITIAL VITALS:  height is 5' 7\" (1.702 m) and weight is 63.5 kg (140 lb). Her tympanic temperature is 99.3 °F (37.4 °C). Her blood pressure is 117/79 and her pulse is 86. Her respiration is 20 and oxygen saturation is 97%. The patient is alert and oriented, in no apparent distress. HEENT is atraumatic. Pupils are PERRL at 4 mm with normal extraocular motion. Mucous membranes moist.  Swelling of the left lower lip. No tongue swelling. Flushing of the face noted with some swelling around the eyelids bilaterally. Neck is supple with no lymphadenopathy. No JVD. No meningismus. Heart sounds regular rate and rhythm with no gallops, murmurs, or rubs. Lungs clear, no wheezes, rales or rhonchi. Abdomen: soft, nontender with no pain to palpation. No pulsatile mass. Normal bowel sounds are noted. No rebound or guarding. Musculoskeletal exam shows no evidence of trauma. Normal distal pulses in all extremities. Skin: no rash or edema. Neurological exam reveals cranial nerves 2 through 12 grossly intact. Patient has equal  and normal deep tendon reflexes. Psychiatric: no hallucinations or suicidal ideation. Lymphatics.:  No lymphadenopathy.          DIFFERENTIAL DIAGNOSIS/ MDM:     Allergic reaction, angioedema, anaphylaxis    DIAGNOSTIC RESULTS         EMERGENCY DEPARTMENT COURSE:   Vitals:    Vitals: 06/20/21 1723 06/20/21 1730 06/20/21 1802   BP: (!) 106/90 (!) 113/90 117/79   Pulse: 117 106 86   Resp: 22 20 20   Temp:  99.3 °F (37.4 °C)    TempSrc:  Tympanic    SpO2: 96% 98% 97%   Weight: 63.5 kg (140 lb)     Height: 5' 7\" (1.702 m)       -------------------------  BP: 117/79, Temp: 99.3 °F (37.4 °C), Pulse: 86, Resp: 20      Re-evaluation Notes    The patient received IM epinephrine with improvement. She also received IV fluids as well as Benadryl, Pepcid, Decadron. She was observed in the emergency department. She had no return of symptoms. She will be discharged home with antihistamines and steroids. She has an EpiPen to use at home. She is advised to return for any worsening symptoms. The patient is discharged in good condition. CRITICAL CARE:     Critical Care: The high probability of sudden, clinically significant deterioration in the patient's condition required the highest level of my preparedness to intervene urgently. ? The services I provided to this patient were to treat and/or prevent clinically significant deterioration. Services included the following: chart data review, reviewing nursing notes and/or old charts, documentation time, consultant collaboration regarding findings and treatment options, medication orders and management, direct patient care, vital sign assessments and ordering, interpreting and reviewing diagnostic studies/lab tests. ?  Aggregate critical care time includes only time during which I was engaged in work directly related to the patient's care, as described above, whether at the bedside or elsewhere in the Emergency Department. It did not include time spent performing other reported procedures or the services of residents, students, nurses, nurse practitioners or physician assistants. ?  Critical Care: 30 minutes. Management of acute allergic reaction with potential for airway compromise        FINAL IMPRESSION      1.  Allergic reaction to food, initial

## 2021-06-20 NOTE — ED NOTES
Pt states feeling little better, decreased swelling to face and lips.      Rayshawn Murray RN  06/20/21 [id number]

## 2021-06-28 ENCOUNTER — TELEPHONE (OUTPATIENT)
Dept: FAMILY MEDICINE CLINIC | Age: 56
End: 2021-06-28

## 2021-07-26 RX ORDER — UMECLIDINIUM 62.5 UG/1
AEROSOL, POWDER ORAL
Qty: 1 EACH | Refills: 6 | Status: SHIPPED | OUTPATIENT
Start: 2021-07-26 | End: 2022-02-14

## 2021-07-26 RX ORDER — FLUTICASONE FUROATE 200 UG/1
POWDER RESPIRATORY (INHALATION)
Qty: 1 EACH | Refills: 6 | Status: SHIPPED | OUTPATIENT
Start: 2021-07-26 | End: 2022-02-14

## 2021-07-26 NOTE — TELEPHONE ENCOUNTER
Dr Harjeet Yang, patient is current and is scheduled for follow up on 7/27/21. Per last dictation patient to continue these medications. Please sign for refill if ok. Thank you.

## 2021-07-27 ENCOUNTER — OFFICE VISIT (OUTPATIENT)
Dept: PULMONOLOGY | Age: 56
End: 2021-07-27
Payer: MEDICARE

## 2021-07-27 VITALS
BODY MASS INDEX: 23.11 KG/M2 | DIASTOLIC BLOOD PRESSURE: 77 MMHG | WEIGHT: 143.8 LBS | HEART RATE: 80 BPM | OXYGEN SATURATION: 98 % | HEIGHT: 66 IN | RESPIRATION RATE: 12 BRPM | SYSTOLIC BLOOD PRESSURE: 102 MMHG

## 2021-07-27 DIAGNOSIS — J45.30 MILD PERSISTENT ASTHMA WITHOUT COMPLICATION: ICD-10-CM

## 2021-07-27 DIAGNOSIS — Z87.891 PERSONAL HISTORY OF TOBACCO USE: ICD-10-CM

## 2021-07-27 DIAGNOSIS — Z87.891 HISTORY OF SMOKING AT LEAST 1 PACK PER DAY FOR AT LEAST 30 YEARS: ICD-10-CM

## 2021-07-27 DIAGNOSIS — R53.83 FATIGUE, UNSPECIFIED TYPE: ICD-10-CM

## 2021-07-27 DIAGNOSIS — F51.04 PSYCHOPHYSIOLOGICAL INSOMNIA: ICD-10-CM

## 2021-07-27 DIAGNOSIS — J44.9 CHRONIC OBSTRUCTIVE PULMONARY DISEASE, UNSPECIFIED COPD TYPE (HCC): Primary | ICD-10-CM

## 2021-07-27 PROCEDURE — G8420 CALC BMI NORM PARAMETERS: HCPCS | Performed by: INTERNAL MEDICINE

## 2021-07-27 PROCEDURE — 1036F TOBACCO NON-USER: CPT | Performed by: INTERNAL MEDICINE

## 2021-07-27 PROCEDURE — 99213 OFFICE O/P EST LOW 20 MIN: CPT | Performed by: INTERNAL MEDICINE

## 2021-07-27 PROCEDURE — G0296 VISIT TO DETERM LDCT ELIG: HCPCS | Performed by: INTERNAL MEDICINE

## 2021-07-27 PROCEDURE — 3023F SPIROM DOC REV: CPT | Performed by: INTERNAL MEDICINE

## 2021-07-27 PROCEDURE — 3017F COLORECTAL CA SCREEN DOC REV: CPT | Performed by: INTERNAL MEDICINE

## 2021-07-27 PROCEDURE — G8427 DOCREV CUR MEDS BY ELIG CLIN: HCPCS | Performed by: INTERNAL MEDICINE

## 2021-07-27 PROCEDURE — G8926 SPIRO NO PERF OR DOC: HCPCS | Performed by: INTERNAL MEDICINE

## 2021-07-27 NOTE — PROGRESS NOTES
PULMONARY OUTPATIENT PROGRESS NOTE        Patient:  Blue Chambers  YOB: 1965    MRN: [id number]     Acct:        Pt seen and Chart reviewed. Mr/Ms Blue Chambers is here in followup for    Diagnosis   1. Chronic obstructive pulmonary disease, unspecified COPD type ([address]    2. Mild persistent asthma without complication    3. Fatigue, unspecified type    4. Psychophysiological insomnia      She is here for follow-up of asthmatic bronchitis/COPD, history of insomnia and chronic fatigue  Since she was seen last time she did not have any exacerbation/flareup ER visit or antibiotic or steroid use. According to patient her fatigue is better sleep quality is better she is able to sleep for 6 hours most of the night which she always did. Her sleep hygiene has been better she does not wake up as frequently as she used to she does have less stress and less worries about sleep. She keep herself active during the daytime and does not have much fatigue. She does not take naps she does not doze off during the daytime  She has history of dyspnea on exertion otherwise she is able to do her regular activities she can walk 1-2 block. According to patient she can go 1-2 flight of stairs up usually without getting shortness of breath although when it is hot and humid she does get symptoms of chest tightness and shortness of breath especially when she has to ask. She is able to do activities outside and ability to activities of daily living. She does not have daily or persistent cough. Denies sputum production. Denies any change in the color or volume. She denies nocturnal awakening with cough wheezing chest tightness shortness of breath denies orthopnea PND. She is taking Arnuity once daily and she take Incruse once daily. She use albuterol only as needed there are days to weeks that she did not require albuterol.     Last CT scan for screening was done in October 2020 CT scan showed 3 to 4 mm right middle lobe and right lower lobe nodule which has been stable since 2019 and likely present on CT scan from 2016 at that time      Previous history and workup  Her last pulmonary function test showed airway reversibility and normal lung volume and normal diffusion capacity and she most likely have more asthmatic bronchitis than fixed obstruction.   H/O pleural empyema and decortication in 2014, h/o asthma / COPD  H/O RML lung nodule and follow up CT did not show nodule and did not require further Ct scan  H/O smoking 37 years 1 PPD and stopped in 2014      Subjective:   Review of Systems -  General ROS: Negative for fatigue negative for weight loss fever night sweats  ENT ROS: Negative for - epistaxis, headaches, nasal congestion, nasal polyps, tinnitus, vertigo or visual changes  Allergy and Immunology ROS: negative for - nasal congestion or postnasal drip  Respiratory ROS: Positive for dyspnea on severe exertion only and negative for - cough, hemoptysis, orthopnea, shortness of breath, sputum changes, tachypnea or wheezing  Cardiovascular ROS:  -Negative for dyspnea on activities, negative for edema, irregular heartbeat, loss of consciousness, murmur, orthopnea, paroxysmal nocturnal dyspnea or rapid heart rate  Gastrointestinal ROS: no abdominal pain, change in bowel habits, or black or bloody stools  Musculoskeletal ROS: negative for - gait disturbance, joint pain or joint stiffness  CNS: negative for dizziness diplopia weakness tingling numbness syncope  Skin and lymphatics: negative for skin rash and skin lesion  Hem/onc: negative for petechia, easy bleeding and bruising    Sleep Medicine 7/27/2021   Sitting and reading 1   Watching TV 1   Sitting, inactive in a public place (e.g. a theatre or a meeting) 0   As a passenger in a car for an hour without a break 0   Lying down to rest in the afternoon when circumstances permit 1   Sitting and talking to someone 0 Sitting quietly after a lunch without alcohol 1   In a car, while stopped for a few minutes in traffic 0   Total score 4       Allergies:   Allergies   Allergen Reactions    Sesame [Oleum Sesami] Anaphylaxis     Sesame seeds    Codeine Nausea And Vomiting       Medications:  Outpatient Encounter Medications as of 7/27/2021   Medication Sig Dispense Refill    ARNUITY ELLIPTA 200 MCG/ACT AEPB inhale 1 puff by mouth and INTO THE LUNGS once daily 1 each 6    INCRUSE ELLIPTA 62.5 MCG/INH AEPB inhale 1 puff by mouth and INTO THE LUNGS once daily 1 each 6    diphenhydrAMINE (BENADRYL) 25 MG capsule Take 2 capsules by mouth every 6 hours as needed for Itching 30 capsule 0    famotidine (PEPCID) 20 MG tablet Take 1 tablet by mouth 2 times daily 10 tablet 0    senna-docusate (RA P COL-RITE) 8.6-50 MG per tablet take 2 tablets by mouth once daily if needed for constipation 60 tablet 2    atorvastatin (LIPITOR) 20 MG tablet Take 1 tablet by mouth daily 120 tablet 3    omeprazole (PRILOSEC) 40 MG delayed release capsule Take 1 capsule by mouth daily 60 capsule 3    ibuprofen (ADVIL;MOTRIN) 800 MG tablet Take 1 tablet by mouth 2 times daily as needed for Pain 60 tablet 0    tiZANidine (ZANAFLEX) 4 MG tablet take 1 tablet by mouth three times a day 30 tablet 0    tazarotene (AVAGE) 0.1 % cream apply topically to affected area every evening 30 g 11    ondansetron (ZOFRAN) 4 MG tablet Take 1 tablet by mouth every 8 hours as needed for Nausea 20 tablet 0    RA ALLERGY RELIEF 10 MG tablet take 1 tablet by mouth once daily 30 tablet 0    EPINEPHrine (EPIPEN 2-CRUZ) 0.3 MG/0.3ML SOAJ injection Use as directed for allergic reaction 1 each 2    albuterol sulfate HFA (VENTOLIN HFA) 108 (90 Base) MCG/ACT inhaler Inhale 2 puffs into the lungs every 6 hours as needed for Wheezing 1 Inhaler 3    Cholecalciferol (VITAMIN D3) 5000 units TABS Take 1 tablet by mouth daily      ferrous sulfate 325 (65 FE) MG tablet take 1 tablet by mouth once daily WITH BREAKFAST 30 tablet 3    TRINTELLIX 20 MG TABS tablet 30 mg   0    aspirin 325 MG EC tablet Take 1 tablet by mouth daily 42 tablet 0     No facility-administered encounter medications on file as of 7/27/2021. Objective:    Physical Exam:  Vitals:   /77 (Site: Left Upper Arm)   Pulse 80   Resp 12   Ht 5' 6\" (1.676 m)   Wt 143 lb 12.8 oz (65.2 kg)   SpO2 98% Comment: Room air at rest  BMI 23.21 kg/m²   Last 3 weights: Wt Readings from Last 3 Encounters:   07/27/21 143 lb 12.8 oz (65.2 kg)   06/20/21 140 lb (63.5 kg)   05/11/21 143 lb (64.9 kg)     Body mass index is 23.21 kg/m².     Physical Examination:   General appearance - alert, well appearing, and in no distress  Mental status - alert, oriented to person, place, and time  Eyes - pupils equal and reactive, extraocular eye movements intact  Ears - right ear normal, left ear normal  Nose - normal and patent, no erythema, discharge or polyps  Mouth - mucous membranes moist, pharynx normal without lesions  Neck - supple, no significant adenopathy  Chest - clear to auscultation, no wheezes, rales or rhonchi, symmetric air entry  Heart - normal rate, regular rhythm, normal S1, S2, no murmurs, rubs, clicks or gallops  Abdomen - soft, nontender, nondistended, no masses or organomegaly  Neurological - alert, oriented, normal speech, no focal findings or movement disorder noted  Extremities - peripheral pulses normal, no pedal edema, no clubbing or cyanosis  Skin - normal coloration and turgor, no rashes, no suspicious skin lesions noted       Labs:  None    CBC:   WBC   Date Value Ref Range Status   10/21/2020 6.0 3.5 - 11.0 k/uL Final     Hemoglobin   Date Value Ref Range Status   10/21/2020 13.0 12.0 - 16.0 g/dL Final     Platelets   Date Value Ref Range Status   10/21/2020 302 140 - 450 k/uL Final     BMP:   Sodium   Date Value Ref Range Status   05/12/2021 144 135 - 144 mmol/L Final     Potassium   Date Value Ref Range Status   05/12/2021 4.0 3.7 - 5.3 mmol/L Final     Chloride   Date Value Ref Range Status   05/12/2021 105 98 - 107 mmol/L Final     CO2   Date Value Ref Range Status   05/12/2021 24 20 - 31 mmol/L Final     BUN   Date Value Ref Range Status   05/12/2021 11 6 - 20 mg/dL Final     CREATININE   Date Value Ref Range Status   05/12/2021 0.64 0.50 - 0.90 mg/dL Final   10/21/2020 0.60 0.50 - 0.90 mg/dL Final   12/11/2017 0.56 0.50 - 0.90 mg/dL Final     Glucose   Date Value Ref Range Status   05/12/2021 52 (L) 70 - 99 mg/dL Final     S. Calcium:   Calcium   Date Value Ref Range Status   05/12/2021 9.6 8.6 - 10.4 mg/dL Final     S. Ionized Calcium: No results found for: IONCA  S. Magnesium: No results found for: MG  S. Phosphorus: No results found for: PHOS  S. Glucose:   POC Glucose   Date Value Ref Range Status   02/19/2014 79 65 - 105 mg/dL Final   02/18/2014 126 (H) 65 - 105 mg/dL Final   02/17/2014 117 (H) 65 - 105 mg/dL Final     Glycosylated hemoglobin A1C:   Hemoglobin A1C   Date Value Ref Range Status   05/11/2021 5.0 % Final       Pulmonary Functions Testing Results:  01/23/17 FEV1 2.07 76%, post BD 86%(12% improvement), FVC 2.94 88%, TLC 4.65 87%, DLCO 17.67 86%    8/3/15: FEV1 1.72 63%,Post BD 83%  and 32% improvemnent, FVC PBD 3.19 88%, JNA2PFK 93% TLC 1.70 85% , DLCO 4.12 86%    Blood Gases: No results found for: PH, PCO2, PO2, HCO3, O2SAT    Radiological reports:    CXR 8/12/2014  IMPRESSION:       No acute radiographic abnormality, or significant interval change. Mild    bibasilar subsegmental atelectasis and/or scarring. CT Scans    Low-dose CT chest 10/16/2020  1. Stable 3-4 mm pulmonary nodules in the right lower lobe and right middle   lobe.  Mild emphysema. 2. Old granulomatous disease. 3. Moderate hiatal hernia. 4. Trace pericardial fluid. 5. Stable low-density lesion measuring 1 cm in the left hepatic lobe,   unchanged from 09/10/2019.        Low-dose CT chest 09/10/2019  Mediastinum:  Heart size at the upper limit of normal.  Trace pericardial   effusion.  Thoracic aorta is normal caliber.  No lymphadenopathy.  Calcified   right hilar lymph nodes.  Thyroid and esophagus are unremarkable.       Lungs/Pleura:  No consolidation or pleural effusion.  Mild left apical   scarring.  Mild scarring at the lung bases.  Stable 4 mm nodules in the right   lower lobe on image 69 and right middle lobe on image 67.  Scattered   calcified granulomas. .         04/26/2016  Mild scarring changes similar to May 8, 2014 radiography. Calcified right hilar lymph nodes and right lung granulomas suggesting prior granulomatous disease. Small hiatal hernia.           Immunization History   Administered Date(s) Administered    Influenza Virus Vaccine 02/13/2014    Influenza, Darrall Spittle, IM, (6 mo and older Fluzone, Flulaval, Fluarix and 3 yrs and older Afluria) 12/21/2017    Influenza, Quadv, IM, PF (6 mo and older Fluzone, Flulaval, Fluarix, and 3 yrs and older Afluria) 11/20/2018, 01/03/2020, 12/08/2020    Influenza, Triv, 3 Years and older, IM (Afluria (5 yrs and older) 11/08/2016    Pneumococcal Conjugate 13-valent ([id number]) 11/08/2016    Pneumococcal Polysaccharide (Deamicalo90) 02/13/2014    Tdap (Boostrix, Adacel) 04/30/2014       Assessment:      1. Chronic obstructive pulmonary disease, unspecified COPD type ([facility] [address]   2. Mild persistent asthma without complication   3. Fatigue, unspecified type improved   4. Psychophysiological insomnia improve         Plan:    Continue with Incruse once daily. Continue with Arnuity. Technique of using medication discussed.   Albuterol as needed  Medications refills done  She had Covid vaccine already  Annual Flu vaccine commended in fall  Uptodate on pneumonia vaccine   Maintain an active lifestyle   As she had history of smoking for 37 years and stopped 7 years ago she will need yearly CT low dose for screening CT scan and next one will be October 2021 requested. Regular sleep-wake cycle. Sleep hygiene discussed. Given instruction for sleep hygiene. Adequate sleep duration    RTC 6 months  Questions answered to pt's satisfaction      Please note that this chart was generated using voice recognition Dragon dictation software. Although every effort was made to ensure the accuracy of this automated transcription, some errors in transcription may have occurred. Rahul Case MD, MD             7/27/2021, 11:16 AM     Low Dose CT (LDCT) Lung Screening criteria met   Age 50-69   Pack year smoking >30   Still smoking or less than 15 year since quit   No sign or symptoms of lung cancer   > 11 months since last LDCT     Risks and benefits of lung cancer screening with LDCT scans discussed:    Significance of positive screen - False-positive LDCT results often occur. 95% of all positive results do not lead to a diagnosis of cancer. Usually further imaging can resolve most false-positive results; however, some patients may require invasive procedures. Over diagnosis risk - 10% to 12% of screen-detected lung cancer cases are over diagnosed--that is, the cancer would not have been detected in the patient's lifetime without the screening. Need for follow up screens annually to continue lung cancer screening effectiveness     Risks associated with radiation from annual LDCT- Radiation exposure is about the same as for a mammogram, which is about 1/3 of the annual background radiation exposure from everyday life. Starting screening at age 54 is not likely to increase cancer risk from radiation exposure. Patients with comorbidities resulting in life expectancy of < 10 years, or that would preclude treatment of an abnormality identified on CT, should not be screened due to lack of benefit.     To obtain maximal benefit from this screening, smoking cessation and long-term abstinence from smoking is critical

## 2021-08-23 DIAGNOSIS — K59.00 CONSTIPATION, UNSPECIFIED CONSTIPATION TYPE: ICD-10-CM

## 2021-08-23 RX ORDER — AMOXICILLIN 250 MG
CAPSULE ORAL
Qty: 30 TABLET | Refills: 1 | Status: SHIPPED | OUTPATIENT
Start: 2021-08-23 | End: 2021-09-02 | Stop reason: SDUPTHER

## 2021-08-23 NOTE — TELEPHONE ENCOUNTER
esophagus without dysplasia     Acne     Dental caries     Smoking     Hypoxia     Empyema (HCC)     GERD (gastroesophageal reflux disease)     H/O chronic respiratory failure     COPD (chronic obstructive pulmonary disease) (HCC)     Orthostatic hypotension     Medication refill     Seasonal allergies     Acne comedone     Bunion     Viral URI     High cholesterol     Constipation     Ear fullness, right     Bulimia nervosa, purging type     Gastroesophageal reflux disease with esophagitis     Chronic bilateral low back pain without sciatica     Gingivitis     Polyuria     Urinary frequency

## 2021-09-02 ENCOUNTER — OFFICE VISIT (OUTPATIENT)
Dept: FAMILY MEDICINE CLINIC | Age: 56
End: 2021-09-02
Payer: MEDICARE

## 2021-09-02 VITALS
HEART RATE: 76 BPM | DIASTOLIC BLOOD PRESSURE: 77 MMHG | SYSTOLIC BLOOD PRESSURE: 103 MMHG | WEIGHT: 141 LBS | BODY MASS INDEX: 22.76 KG/M2 | TEMPERATURE: 97.3 F

## 2021-09-02 DIAGNOSIS — M21.611 BUNION OF RIGHT FOOT: Primary | ICD-10-CM

## 2021-09-02 DIAGNOSIS — E78.00 HIGH CHOLESTEROL: ICD-10-CM

## 2021-09-02 DIAGNOSIS — Z12.31 ENCOUNTER FOR SCREENING MAMMOGRAM FOR BREAST CANCER: ICD-10-CM

## 2021-09-02 DIAGNOSIS — K59.00 CONSTIPATION, UNSPECIFIED CONSTIPATION TYPE: ICD-10-CM

## 2021-09-02 DIAGNOSIS — K22.70 BARRETT'S ESOPHAGUS WITHOUT DYSPLASIA: ICD-10-CM

## 2021-09-02 PROCEDURE — 99213 OFFICE O/P EST LOW 20 MIN: CPT | Performed by: STUDENT IN AN ORGANIZED HEALTH CARE EDUCATION/TRAINING PROGRAM

## 2021-09-02 PROCEDURE — 3017F COLORECTAL CA SCREEN DOC REV: CPT | Performed by: STUDENT IN AN ORGANIZED HEALTH CARE EDUCATION/TRAINING PROGRAM

## 2021-09-02 PROCEDURE — G8427 DOCREV CUR MEDS BY ELIG CLIN: HCPCS | Performed by: STUDENT IN AN ORGANIZED HEALTH CARE EDUCATION/TRAINING PROGRAM

## 2021-09-02 PROCEDURE — 1036F TOBACCO NON-USER: CPT | Performed by: STUDENT IN AN ORGANIZED HEALTH CARE EDUCATION/TRAINING PROGRAM

## 2021-09-02 PROCEDURE — G8420 CALC BMI NORM PARAMETERS: HCPCS | Performed by: STUDENT IN AN ORGANIZED HEALTH CARE EDUCATION/TRAINING PROGRAM

## 2021-09-02 RX ORDER — ATORVASTATIN CALCIUM 20 MG/1
20 TABLET, FILM COATED ORAL DAILY
Qty: 120 TABLET | Refills: 3 | Status: SHIPPED | OUTPATIENT
Start: 2021-09-02 | End: 2021-12-13 | Stop reason: SDUPTHER

## 2021-09-02 RX ORDER — OMEPRAZOLE 40 MG/1
40 CAPSULE, DELAYED RELEASE ORAL DAILY
Qty: 60 CAPSULE | Refills: 3 | Status: SHIPPED | OUTPATIENT
Start: 2021-09-02 | End: 2021-12-13 | Stop reason: SDUPTHER

## 2021-09-02 RX ORDER — AMOXICILLIN 250 MG
CAPSULE ORAL
Qty: 30 TABLET | Refills: 1 | Status: SHIPPED | OUTPATIENT
Start: 2021-09-02 | End: 2021-10-28

## 2021-09-02 ASSESSMENT — ENCOUNTER SYMPTOMS
ABDOMINAL PAIN: 0
SHORTNESS OF BREATH: 0
VOMITING: 0
NAUSEA: 0
WHEEZING: 0

## 2021-09-02 NOTE — PROGRESS NOTES
Visit Information    Have you changed or started any medications since your last visit including any over-the-counter medicines, vitamins, or herbal medicines? no   Have you stopped taking any of your medications? Is so, why? -  no  Are you having any side effects from any of your medications? - no    Have you seen any other physician or provider since your last visit?  no   Have you had any other diagnostic tests since your last visit?  no   Have you been seen in the emergency room and/or had an admission in a hospital since we last saw you?  no   Have you had your routine dental cleaning in the past 6 months?  no     Do you have an active MyChart account? If no, what is the barrier?   No:     Patient Care Team:  Ras Griggs MD as PCP - General (Emergency Medicine)  Quinn Gallego MD as Surgeon (Cardiothoracic Surgery)  Yessica Alfaro MD as Consulting Physician (Pulmonology)  Catherine Pulliam RN as Nurse Navigator    Medical History Review  Past Medical, Family, and Social History reviewed and does not contribute to the patient presenting condition    Health Maintenance   Topic Date Due    Shingles Vaccine (1 of 2) Never done    Breast cancer screen  07/25/2019    Annual Wellness Visit (AWV)  Never done    Flu vaccine (1) 09/01/2021    Low dose CT lung screening  10/16/2021    Lipid screen  02/11/2022    DTaP/Tdap/Td vaccine (2 - Td or Tdap) 04/30/2024    Colon cancer screen colonoscopy  07/14/2026    Pneumococcal 0-64 years Vaccine (2 of 2 - PPSV23) 07/17/2029    COVID-19 Vaccine  Completed    Hepatitis C screen  Completed    HIV screen  Completed    Hepatitis A vaccine  Aged Out    Hepatitis B vaccine  Aged Out    Hib vaccine  Aged Out    Meningococcal (ACWY) vaccine  Aged Out

## 2021-09-02 NOTE — PATIENT INSTRUCTIONS
Thank you for letting us take care of you today. We hope all your questions were addressed. If a question was overlooked or something else comes to mind after you return home, please contact a member of your Care Team listed below. Your Care Team at [place] [id number] is Team #3  Troy Butler MD (Faculty)  Yeny Light MD (Faculty  Hildaroseanne Flores MD (Resident)  Fransisco Mccall (Resident)   Venus Horne MD (Resident)  JEANIE Villalobos., DERIK Capps., DERIK Giraldo ([address] [facility])  [place], [place] ([address] [facility])    Zenaida Weir, [address] [place] [address] (Clinical Pharmacist)     Office phone number: [phone]    If you need to get in right away due to illness, please be advised we have \"Same Day\" appointments available Monday-Friday. Please call us at [phone] option #3 to schedule your \"Same Day\" appointment.

## 2021-09-02 NOTE — PROGRESS NOTES
Subjective:    Yumiko Iyer is a 61 y.o. female with  has a past medical history of Vargas's esophagus, Bipolar disorder ([facility] [address] Chronic obstructive pulmonary disease (COPD) ([facility] [address] Depression, Dyspnea on exertion, Eating disorder, Empyema ([facility] [address] Fatigue due to depression, GERD (gastroesophageal reflux disease), H/O chronic respiratory failure, Moderate persistent asthma, Seasonal allergies, Smoking, and Substance abuse ([facility] [address] Family History   Problem Relation Age of Onset    Asthma Mother     Stroke Father     Heart Attack Father     Cancer Maternal Grandmother         skin cancer    Diabetes Maternal Uncle        Presented tothe office today for:  Chief Complaint   Patient presents with    6 Month Follow-Up     patient states doing well just refill       HPI Baron Light is a 62-year-old female with a PMH significant for COPD and GERD. Patient presents today with complaints of right bunion pain. Right bunion pain: As per patient, she has had right bunion pain for the past several years. Patient states she was a  for approximately 25 years. She complains of pain involving the right great toe metatarsal.  Describes the pain as stabbing in nature. The pain 7/10. Aggravated by standing and walking. Alleviated by rest.  Denies any numbness, tingling, or weakness. COPD: Patient follows up with Dr. Nikkie Lewis who is her pulmonologist.  She was last seen on 7/27/2021. Plan was to continue with Incruse once daily and Arnuity. Patient will need yearly low-dose CT which is tentatively scheduled for October 2021. Review of Systems   Constitutional: Negative for chills, fatigue and fever. Respiratory: Negative for shortness of breath and wheezing. Cardiovascular: Negative for chest pain and palpitations. Gastrointestinal: Negative for abdominal pain, nausea and vomiting. Genitourinary: Negative for difficulty urinating. Musculoskeletal: Positive for arthralgias.    Neurological: Negative for syncope, weakness, numbness and headaches. Objective:    /77 (Site: Left Upper Arm, Position: Sitting, Cuff Size: Medium Adult)   Pulse 76   Temp 97.3 °F (36.3 °C) (Temporal)   Wt 141 lb (64 kg)   BMI 22.76 kg/m²    BP Readings from Last 3 Encounters:   09/02/21 103/77   07/27/21 102/77   06/20/21 101/64     Physical Exam  Constitutional:       General: She is not in acute distress. Appearance: Normal appearance. She is not ill-appearing. HENT:      Head: Normocephalic and atraumatic. Eyes:      Extraocular Movements: Extraocular movements intact. Cardiovascular:      Rate and Rhythm: Normal rate and regular rhythm. Pulses: Normal pulses. Heart sounds: No murmur heard. No gallop. Pulmonary:      Effort: Pulmonary effort is normal. No respiratory distress. Breath sounds: No wheezing, rhonchi or rales. Musculoskeletal:      Right foot: Normal range of motion. Swelling and bunion present. No foot drop, tenderness, bony tenderness or crepitus. Legs:    Skin:     General: Skin is warm. Neurological:      General: No focal deficit present. Mental Status: She is alert and oriented to person, place, and time. Psychiatric:         Mood and Affect: Mood normal.           Lab Results   Component Value Date    WBC 6.0 10/21/2020    HGB 13.0 10/21/2020    HCT 39.4 10/21/2020     10/21/2020    CHOL 181 02/11/2021    TRIG 75 02/11/2021    HDL 67 02/11/2021    ALT 17 02/11/2021    AST 20 02/11/2021     05/12/2021    K 4.0 05/12/2021     05/12/2021    CREATININE 0.64 05/12/2021    BUN 11 05/12/2021    CO2 24 05/12/2021    TSH 0.43 02/18/2020    INR 1.2 02/21/2014    LABA1C 5.0 05/11/2021     Lab Results   Component Value Date    CALCIUM 9.6 05/12/2021     Lab Results   Component Value Date    LDLCHOLESTEROL 99 02/11/2021       Assessment and Plan:    1.  Bunion of right foot  -Referred patient to podiatry clinic for further evaluation  - [address]    2. Vargas's esophagus without dysplasia  -refilled prescription for Prilosec 40 mg once daily  - omeprazole (PRILOSEC) 40 MG delayed release capsule; Take 1 capsule by mouth daily  Dispense: 60 capsule; Refill: 3    3. High cholesterol  -Refilled prescription for Lipitor 20 mg once daily  - atorvastatin (LIPITOR) 20 MG tablet; Take 1 tablet by mouth daily  Dispense: 120 tablet; Refill: 3    4. Constipation, unspecified constipation type  -Refilled prescription for senna-docusate 2 tablets by mouth once daily as needed  - senna-docusate (RA P COL-RITE) 8.6-50 MG per tablet; take 2 tablets by mouth once daily if needed for constipation  Dispense: 30 tablet; Refill: 1    5. Encounter for screening mammogram for breast cancer  -Ordered screening mammography  - [facility] Digital Screen Bilateral [[id number]]; Future          Requested Prescriptions     Signed Prescriptions Disp Refills    omeprazole (PRILOSEC) 40 MG delayed release capsule 60 capsule 3     Sig: Take 1 capsule by mouth daily    atorvastatin (LIPITOR) 20 MG tablet 120 tablet 3     Sig: Take 1 tablet by mouth daily    senna-docusate (RA P COL-RITE) 8.6-50 MG per tablet 30 tablet 1     Sig: take 2 tablets by mouth once daily if needed for constipation       Medications Discontinued During This Encounter   Medication Reason    atorvastatin (LIPITOR) 20 MG tablet REORDER    omeprazole (PRILOSEC) 40 MG delayed release capsule REORDER    senna-docusate (RA P COL-RITE) 8.6-50 MG per tablet REORDER       Return in about 4 months (around 1/2/2022). Laurel received counseling on the following healthy behaviors:   Reviewed prior labs and health maintenance  Continue current medications, diet and exercise. Discussed use, benefit, and side effects of prescribed medications. Barriers to medication compliance addressed.    Patient given educational materials - see patient instructions  Was a self-tracking handout given in paper form or via [employer]? No:     Requested Prescriptions     Signed Prescriptions Disp Refills    omeprazole (PRILOSEC) 40 MG delayed release capsule 60 capsule 3     Sig: Take 1 capsule by mouth daily    atorvastatin (LIPITOR) 20 MG tablet 120 tablet 3     Sig: Take 1 tablet by mouth daily    senna-docusate (RA P COL-RITE) 8.6-50 MG per tablet 30 tablet 1     Sig: take 2 tablets by mouth once daily if needed for constipation       All patient questions answered. Patient voiced understanding. Quality Measures    Body mass index is 22.76 kg/m². Normal. Weight control planned discussed Healthy diet and regular exercise. BP: 103/77 Blood pressure is normal. Treatment plan consists of No treatment change needed.     Lab Results   Component Value Date    LDLCHOLESTEROL 99 02/11/2021    (goal LDL reduction with dx if diabetes is 50% LDL reduction)      PHQ Scores 5/11/2021 1/3/2020 12/21/2017 8/8/2017   PHQ2 Score 0 0 0 0   PHQ9 Score 0 0 0 0     Interpretation of Total Score Depression Severity: 1-4 = Minimal depression, 5-9 = Mild depression, 10-14 = Moderate depression, 15-19 = Moderately severe depression, 20-27 = Severe depression

## 2021-09-14 NOTE — PROGRESS NOTES
Attending Physician Statement  I  have discussed the care of Mary Rae including pertinent history and exam findings with the resident. I agree with the assessment, plan and orders as documented by the resident. /77 (Site: Left Upper Arm, Position: Sitting, Cuff Size: Medium Adult)   Pulse 76   Temp 97.3 °F (36.3 °C) (Temporal)   Wt 141 lb (64 kg)   BMI 22.76 kg/m²    BP Readings from Last 3 Encounters:   09/02/21 103/77   07/27/21 102/77   06/20/21 101/64     Wt Readings from Last 3 Encounters:   09/02/21 141 lb (64 kg)   07/27/21 143 lb 12.8 oz (65.2 kg)   06/20/21 140 lb (63.5 kg)          Diagnosis Orders   1. Bunion of right foot  [address]   2. Vargas's esophagus without dysplasia  omeprazole (PRILOSEC) 40 MG delayed release capsule   3. High cholesterol  atorvastatin (LIPITOR) 20 MG tablet   4. Constipation, unspecified constipation type  senna-docusate (RA P COL-RITE) 8.6-50 MG per tablet   5. Encounter for screening mammogram for breast cancer  [facility] Digital Screen Bilateral [[id number]]       See orders. RTO as noted, discussed care plan with patient and Resident Physician. Questions answered. Call results - if indicated to patient.     Recheck office visit -      Stefanie Murguia DO 9/14/2021 4:00 PM

## 2021-10-04 NOTE — PATIENT INSTRUCTIONS
Patient Education        Corns and Calluses: Care Instructions  Your Care Instructions  Corns and calluses are areas of thick, hard, dead skin. They form to protect your skin from injury. Corns usually form where toes rub together. Calluses often form on the hands or feet. They may form wherever the skin rubs against something, such as shoes. In most cases, you can take steps at home to care for a corn or callus. Follow-up care is a key part of your treatment and safety. Be sure to make and go to all appointments, and call your doctor if you are having problems. It's also a good idea to know your test results and keep a list of the medicines you take. How can you care for yourself at home? · Wear shoes and other footwear that fit correctly and are roomy. This will reduce rubbing and give corns or calluses time to heal.  · Use protective pads, such as moleskin, to cushion the callus or corn. · Soften the corn or callus and remove the dead skin by using over-the-counter products such as salicylic acid. If you have a condition that causes problems with blood flow (such as peripheral vascular disease) or loss of feeling in your feet (such as diabetes), talk to your doctor before you try any home treatment. · Soak your corn or callus in warm water, and then use a pumice stone to rub dead skin away. · Wash your feet regularly, and rub lotion into your feet while they are still moist. Dry skin can cause a callus to crack and bleed. · Never cut the corn or callus yourself, especially if you have problems with blood flow to your legs or feet. When should you call for help? Call your doctor now or seek immediate medical care if:    · You have signs of infection, such as:  ? Increased pain, swelling, warmth, or redness around the corn or callus. ? Red streaks leading from the corn or callus. ? Pus draining from the corn or callus. ? A fever.    Watch closely for changes in your health, and be sure to contact your doctor if:    · You do not get better as expected. Where can you learn more? Go to [email or web address] org and sign in to your [employer] account. Enter R244 in the [facility] box to learn more about \"Corns and Calluses: Care Instructions. \"     If you do not have an account, please click on the \"Sign Up Now\" link. Current as of: March 3, 2021               Content Version: 13.0  © 4304-1844 [employer]. Care instructions adapted under license by [facility] ([facility]). If you have questions about a medical condition or this instruction, always ask your healthcare professional. [address] any warranty or liability for your use of this information.

## 2021-10-04 NOTE — PROGRESS NOTES
Patient instructed to remove shoes and socks and instructed to sit in exam chair. Current PCP is Sonya Nelson MD and date of last visit was 9/2/21. Do you have a follow up visit scheduled?   Yes  If yes, the date is 11/2/21

## 2021-10-11 ENCOUNTER — TELEPHONE (OUTPATIENT)
Dept: ONCOLOGY | Age: 56
End: 2021-10-11

## 2021-10-11 NOTE — LETTER
10/11/2021        Clearance Edgar  [address]  [facility] [address]    Dear Clearance Edgar:    Your healthcare provider has ordered a low dose CT scan of the chest for lung cancer screening. You will find enclosed, information about CT lung screening. Please review the statement of understanding, you will be asked to sign a copy of this at the time of your CT scan    If you have not already been contacted to make the appointment for your scan, please call our scheduling department at [phone]    Keep in mind that CT lung screening does not take the place of smoking cessation. If you are a current smoker, you will find enclosed smoking cessation resources. Please do not hesitate to contact me if you have any questions or concerns.     [address] [facility],      [facility] Lung Screening Program  [phone]

## 2021-10-13 ENCOUNTER — OFFICE VISIT (OUTPATIENT)
Dept: PODIATRY | Age: 56
End: 2021-10-13
Payer: MEDICARE

## 2021-10-13 ENCOUNTER — HOSPITAL ENCOUNTER (OUTPATIENT)
Dept: GENERAL RADIOLOGY | Age: 56
Discharge: HOME | End: 2021-10-15
Payer: MEDICARE

## 2021-10-13 ENCOUNTER — HOSPITAL ENCOUNTER (OUTPATIENT)
Age: 56
Discharge: HOME | End: 2021-10-15
Payer: MEDICARE

## 2021-10-13 VITALS
DIASTOLIC BLOOD PRESSURE: 73 MMHG | HEART RATE: 69 BPM | WEIGHT: 141 LBS | BODY MASS INDEX: 22.66 KG/M2 | SYSTOLIC BLOOD PRESSURE: 105 MMHG | HEIGHT: 66 IN

## 2021-10-13 DIAGNOSIS — M21.42 PES PLANUS OF BOTH FEET: ICD-10-CM

## 2021-10-13 DIAGNOSIS — M20.11 HALLUX VALGUS OF RIGHT FOOT: ICD-10-CM

## 2021-10-13 DIAGNOSIS — M20.11 HALLUX VALGUS OF RIGHT FOOT: Primary | ICD-10-CM

## 2021-10-13 DIAGNOSIS — M21.41 PES PLANUS OF BOTH FEET: ICD-10-CM

## 2021-10-13 DIAGNOSIS — M79.671 CHRONIC PAIN IN RIGHT FOOT: ICD-10-CM

## 2021-10-13 DIAGNOSIS — G89.29 CHRONIC PAIN IN RIGHT FOOT: ICD-10-CM

## 2021-10-13 PROCEDURE — G8420 CALC BMI NORM PARAMETERS: HCPCS | Performed by: STUDENT IN AN ORGANIZED HEALTH CARE EDUCATION/TRAINING PROGRAM

## 2021-10-13 PROCEDURE — 99212 OFFICE O/P EST SF 10 MIN: CPT | Performed by: STUDENT IN AN ORGANIZED HEALTH CARE EDUCATION/TRAINING PROGRAM

## 2021-10-13 PROCEDURE — 1036F TOBACCO NON-USER: CPT | Performed by: STUDENT IN AN ORGANIZED HEALTH CARE EDUCATION/TRAINING PROGRAM

## 2021-10-13 PROCEDURE — 99213 OFFICE O/P EST LOW 20 MIN: CPT | Performed by: STUDENT IN AN ORGANIZED HEALTH CARE EDUCATION/TRAINING PROGRAM

## 2021-10-13 PROCEDURE — G8484 FLU IMMUNIZE NO ADMIN: HCPCS | Performed by: STUDENT IN AN ORGANIZED HEALTH CARE EDUCATION/TRAINING PROGRAM

## 2021-10-13 PROCEDURE — G8427 DOCREV CUR MEDS BY ELIG CLIN: HCPCS | Performed by: STUDENT IN AN ORGANIZED HEALTH CARE EDUCATION/TRAINING PROGRAM

## 2021-10-13 PROCEDURE — 3017F COLORECTAL CA SCREEN DOC REV: CPT | Performed by: STUDENT IN AN ORGANIZED HEALTH CARE EDUCATION/TRAINING PROGRAM

## 2021-10-13 PROCEDURE — 73630 X-RAY EXAM OF FOOT: CPT

## 2021-10-13 NOTE — PROGRESS NOTES
Tobacco Use    Smoking status: Former Smoker     Packs/day: 1.00     Years: 34.00     Pack years: 34.00     Types: Cigarettes     Start date: 1977     Quit date: 2014     Years since quittin.6    Smokeless tobacco: Never Used   Vaping Use    Vaping Use: Never used   Substance Use Topics    Alcohol use: Yes     Comment: quit 38 days ago after 30 plus years of heavy abuse    Drug use: No     Types: Cocaine     Comment: past hx of cocaine, marijuana, denies any IVDA       Medications:  Prior to Admission medications    Medication Sig Start Date End Date Taking?  Authorizing Provider   omeprazole (PRILOSEC) 40 MG delayed release capsule Take 1 capsule by mouth daily 21 Yes Lisa Mcqueen MD   atorvastatin (LIPITOR) 20 MG tablet Take 1 tablet by mouth daily 21  Yes Lisa Mcqueen MD   senna-docusate (RA P COL-RITE) 8.6-50 MG per tablet take 2 tablets by mouth once daily if needed for constipation 21  Yes Lisa Mcqueen MD   ARNUITY ELLIPTA 200 MCG/ACT AEPB inhale 1 puff by mouth and INTO THE LUNGS once daily 21  Yes Andres Blair MD   INCRUSE ELLIPTA 62.5 MCG/INH AEPB inhale 1 puff by mouth and INTO THE LUNGS once daily 21  Yes Andres Blair MD   diphenhydrAMINE (BENADRYL) 25 MG capsule Take 2 capsules by mouth every 6 hours as needed for Itching 21  Yes Elizabeth Rodríguez MD   famotidine (PEPCID) 20 MG tablet Take 1 tablet by mouth 2 times daily 21  Yes Elizabeth Rodríguez MD   ibuprofen (ADVIL;MOTRIN) 800 MG tablet Take 1 tablet by mouth 2 times daily as needed for Pain 21  Yes Cynthia Zaidi MD   tiZANidine (ZANAFLEX) 4 MG tablet take 1 tablet by mouth three times a day 21  Yes Cynthia Zaidi MD   tazarotene (AVAGE) 0.1 % cream apply topically to affected area every evening 3/4/21  Yes Rahul Chapa MD   ondansetron (ZOFRAN) 4 MG tablet Take 1 tablet by mouth every 8 hours as needed for Nausea 10/21/20  Yes Pat Irving MD   RA ALLERGY RELIEF 10 MG tablet take 1 tablet by mouth once daily 12/15/18  Yes Ez Franklin MD   EPINEPHrine (EPIPEN 2-CRUZ) 0.3 MG/0.3ML SOAJ injection Use as directed for allergic reaction 12/21/17  Yes Jeancarlos Long MD   albuterol sulfate HFA (VENTOLIN HFA) 108 (90 Base) MCG/ACT inhaler Inhale 2 puffs into the lungs every 6 hours as needed for Wheezing 10/11/17  Yes Fer Gonzalez MD   Cholecalciferol (VITAMIN D3) 5000 units TABS Take 1 tablet by mouth daily   Yes Historical Provider, MD   ferrous sulfate 325 (65 FE) MG tablet take 1 tablet by mouth once daily WITH BREAKFAST 5/22/17  Yes Jeancarlos Long MD   TRINTELLIX 20 MG TABS tablet 30 mg  9/19/16  Yes Historical Provider, MD   aspirin 325 MG EC tablet Take 1 tablet by mouth daily 8/21/19 6/20/21  Melonie Gonsalez MD       Objective     Vitals:    10/13/21 1253   BP: 105/73   Pulse: 69       Lab Results   Component Value Date    LABA1C 5.0 05/11/2021       Physical Exam:  Patient is a 61 y.o. female who appears well developed, well nourished and with good attention to hygiene and body habitus. Vascular:  DP and PT pulses are palpable. CFT less than 3 seconds to all digits b/l. Skin temperature is warm to warm from proximal to distal b/l. Hair growth is noted. Mild edema to 1st MTPJ redness. No varicosities noted. Neuro:  Light touch intact b/l. Protective sensation intact at all pedal sites via [employer] 5.07 monofilament b/l. Proprioception intact at the hallux b/l. Derm:  Skin texture and turgor within normal limits. Toenails normal in appearance. Webspaces 1-4 clean, dry, intact b/l. No rashes, subcutaneous nodules, or open lesions noted. No hyperkeratotic tissue. Musc:  Decrease medial longitudinal arch. Muscle strength is 5/5 for all muscle groups including dorsiflexors, plantarflexors, everters, and inverters b/l. There is a bony prominence noted on the medial aspect of the first metatarsal head right foot.  The hallux is in a laterally contracted and abducted position. There is pain to palpation of the prominent medial eminence. Local effusion is noted to the 1st MTP joint. Ankle joint ROM is decreased b/l, STJ, and MTJ ROM full without pain or crepitus b/l. Imaging: Ordered    Assessment   Argenis Patiño is a 61 y.o. female with     Diagnosis Orders   1. Hallux valgus of right foot  XR FOOT RIGHT (MIN 3 VIEWS)   2. Pes planus of both feet  XR FOOT RIGHT (MIN 3 VIEWS)        Plan    The patient was educated on clinical findings, diagnosis and treatment plans. Patient states that she understands all that has been explained and all questions were answered to her apparent satisfaction. The patient presented to our clinic today for right foot pain secondary to hallux valgus and pes planus deformity  We discussed the need for radiographs of the right foot to assess osseous structure, more rigid over-the-counter orthotics, custom orthotics. We also discussed possible surgical intervention in the future should these modalities continue to fail. There is low risk of morbidity from additional diagnostic testing or treatment. At this time we recommend moving forward with conservative care consisting of more rigid orthotics, radiographs of the right foot, custom orthotics at next office visit. Other medical conditions considered during our medical decision making process were COPD, smoking, GERD, Vargas's esophagus. Patient to return to clinic in 1 week after obtaining x-rays. Please call the office with any questions or concerns. No orders of the defined types were placed in this encounter.     Orders Placed This Encounter   Procedures    XR FOOT RIGHT (MIN 3 VIEWS)     Standing Status:   Future     Standing Expiration Date:   10/13/2022     Scheduling Instructions:      PLEASE PERFORM WEIGHTBEARING     Order Specific Question:   Reason for exam:     Answer:   right foot pain, surgical planning       Mana Maradiaga DPM Podiatric Medicine & Surgery   10/13/2021 at 1:32 PM

## 2021-10-18 ENCOUNTER — HOSPITAL ENCOUNTER (OUTPATIENT)
Dept: CT IMAGING | Age: 56
Discharge: HOME | End: 2021-10-20
Payer: MEDICARE

## 2021-10-18 ENCOUNTER — OFFICE VISIT (OUTPATIENT)
Dept: PODIATRY | Age: 56
End: 2021-10-18
Payer: MEDICARE

## 2021-10-18 VITALS
WEIGHT: 144 LBS | BODY MASS INDEX: 23.14 KG/M2 | DIASTOLIC BLOOD PRESSURE: 62 MMHG | HEIGHT: 66 IN | SYSTOLIC BLOOD PRESSURE: 96 MMHG | HEART RATE: 72 BPM

## 2021-10-18 DIAGNOSIS — M79.672 PAIN IN BOTH FEET: ICD-10-CM

## 2021-10-18 DIAGNOSIS — M79.671 CHRONIC PAIN IN RIGHT FOOT: ICD-10-CM

## 2021-10-18 DIAGNOSIS — M21.42 PES PLANUS OF BOTH FEET: ICD-10-CM

## 2021-10-18 DIAGNOSIS — Z87.891 PERSONAL HISTORY OF TOBACCO USE: ICD-10-CM

## 2021-10-18 DIAGNOSIS — M20.11 HALLUX VALGUS OF RIGHT FOOT: Primary | ICD-10-CM

## 2021-10-18 DIAGNOSIS — M21.41 PES PLANUS OF BOTH FEET: ICD-10-CM

## 2021-10-18 DIAGNOSIS — M79.671 PAIN IN BOTH FEET: ICD-10-CM

## 2021-10-18 DIAGNOSIS — G89.29 CHRONIC PAIN IN RIGHT FOOT: ICD-10-CM

## 2021-10-18 PROCEDURE — 3017F COLORECTAL CA SCREEN DOC REV: CPT

## 2021-10-18 PROCEDURE — G8420 CALC BMI NORM PARAMETERS: HCPCS

## 2021-10-18 PROCEDURE — 99203 OFFICE O/P NEW LOW 30 MIN: CPT

## 2021-10-18 PROCEDURE — 71271 CT THORAX LUNG CANCER SCR C-: CPT

## 2021-10-18 PROCEDURE — G8427 DOCREV CUR MEDS BY ELIG CLIN: HCPCS

## 2021-10-18 PROCEDURE — G8484 FLU IMMUNIZE NO ADMIN: HCPCS

## 2021-10-18 PROCEDURE — 99202 OFFICE O/P NEW SF 15 MIN: CPT

## 2021-10-18 PROCEDURE — 1036F TOBACCO NON-USER: CPT

## 2021-10-18 NOTE — PROGRESS NOTES
[address]  [address] [place] [address], Valeria S Slava Dumont  Tel: [phone]   Fax: [phone]    Subjective     CC: Right foot pain    Interval History:  Patient returns to clinic today to review her x-rays and get molded for her orthotics to bilateral feet. Patient still rates the pain 7 out of 10. Patient states that she would like to get a surgery where she is able to bear weight immediately after surgery as she has to take her child back and forth for school. She states that she would likely get the surgery during Thanksgiving time. She still relates swelling and redness to the right bunion. HPI:  Alexandro Umana is a 61y.o. year old female who presents to clinic today for a painful bunion rght. Patient states that since last visit pain has worsening. Pain is moderate, rated as 7/10 and present in shoes after prolonged WB. Appears to be relieved with removal of shoe gear. Relates occasional mild swelling and admits redness. Amits use of analgesics, with minimal relief. Has tried to modify shoe gear and over the counter orthotics with no relief  Denies any other pedal complaints. Primary care physician is Kim Whitaker MD.    ROS:    Constitutional: Denies nausea, vomiting, fever, chills. Neurologic: Denies numbness, tingling, and burning in the feet. Vascular: Denies symptoms of lower extremity claudication. Skin: Denies open wounds. Otherwise negative except as noted in the HPI.      PMH:  Past Medical History:   Diagnosis Date    Vargas's esophagus     for 20 yrs on and off    Bipolar disorder ([facility]     recently diagnosed and placed on meds    Chronic obstructive pulmonary disease (COPD) ([facility]     Depression     On Meds    Dyspnea on exertion     Eating disorder     bulemia    Empyema ([facility] 2/17/2014    left    Fatigue due to depression     GERD (gastroesophageal reflux disease)     H/O chronic respiratory failure     Moderate persistent asthma     Seasonal allergies     Smoking     Substance abuse [facility])        Surgical History:   Past Surgical History:   Procedure Laterality Date    ENDOSCOPY, COLON, DIAGNOSTIC      HYSTERECTOMY  1988    THORACOSCOPY  14    Lt. VATS w/complete decortication    UPPER GASTROINTESTINAL ENDOSCOPY  10/13/2014    GE junction biopsy    UPPER GASTROINTESTINAL ENDOSCOPY N/A 11/10/2020    EGD BIOPSY performed by Rudine Gaucher, MD at [place] [address] History:  Social History     Tobacco Use    Smoking status: Former Smoker     Packs/day: 1.00     Years: 34.00     Pack years: 34.00     Types: Cigarettes     Start date: 1977     Quit date: 2014     Years since quittin.6    Smokeless tobacco: Never Used   Vaping Use    Vaping Use: Never used   Substance Use Topics    Alcohol use: Yes     Comment: quit 38 days ago after 30 plus years of heavy abuse    Drug use: No     Types: Cocaine     Comment: past hx of cocaine, marijuana, denies any IVDA       Medications:  Prior to Admission medications    Medication Sig Start Date End Date Taking?  Authorizing Provider   omeprazole (PRILOSEC) 40 MG delayed release capsule Take 1 capsule by mouth daily 21 Yes Orestes aJrrett MD   atorvastatin (LIPITOR) 20 MG tablet Take 1 tablet by mouth daily 21  Yes Orestes Jarrett MD   senna-docusate (RA P COL-RITE) 8.6-50 MG per tablet take 2 tablets by mouth once daily if needed for constipation 21  Yes Orestes Jarrett MD   ARNUITY ELLIPTA 200 MCG/ACT AEPB inhale 1 puff by mouth and INTO THE LUNGS once daily 21  Yes Heri Lambert MD   INCRUSE ELLIPTA 62.5 MCG/INH AEPB inhale 1 puff by mouth and INTO THE LUNGS once daily 21  Yes Heri Lambert MD   diphenhydrAMINE (BENADRYL) 25 MG capsule Take 2 capsules by mouth every 6 hours as needed for Itching 21  Yes Berlin Landry MD   famotidine (PEPCID) 20 MG tablet Take 1 tablet by mouth 2 times daily 21  Yes Berlin Landry MD   ibuprofen (ADVIL;MOTRIN) 800 MG tablet Take 1 tablet by mouth 2 times daily as needed for Pain 5/11/21  Yes Kip Mitchell MD   tiZANidine (ZANAFLEX) 4 MG tablet take 1 tablet by mouth three times a day 5/11/21  Yes Kip Mitchell MD   tazarotene (AVAGE) 0.1 % cream apply topically to affected area every evening 3/4/21  Yes Brenda Lawrence MD   ondansetron (ZOFRAN) 4 MG tablet Take 1 tablet by mouth every 8 hours as needed for Nausea 10/21/20  Yes Mary Paiz MD   RA ALLERGY RELIEF 10 MG tablet take 1 tablet by mouth once daily 12/15/18  Yes Shanna Pino MD   EPINEPHrine (EPIPEN 2-CRUZ) 0.3 MG/0.3ML SOAJ injection Use as directed for allergic reaction 12/21/17  Yes Burnell Snellen, MD   albuterol sulfate HFA (VENTOLIN HFA) 108 (90 Base) MCG/ACT inhaler Inhale 2 puffs into the lungs every 6 hours as needed for Wheezing 10/11/17  Yes Luis Alfredo Gaspar MD   Cholecalciferol (VITAMIN D3) 5000 units TABS Take 1 tablet by mouth daily   Yes Historical Provider, MD   ferrous sulfate 325 (65 FE) MG tablet take 1 tablet by mouth once daily WITH BREAKFAST 5/22/17  Yes Burnell Snellen, MD   TRINTELLIX 20 MG TABS tablet 30 mg  9/19/16  Yes Historical Provider, MD   aspirin 325 MG EC tablet Take 1 tablet by mouth daily 8/21/19 6/20/21  William Sandhu MD       Objective     Vitals:    10/18/21 1337   BP: 96/62   Pulse: 72       Lab Results   Component Value Date    LABA1C 5.0 05/11/2021       Physical Exam:  Patient is a 61 y.o. female who appears well developed, well nourished and with good attention to hygiene and body habitus. Vascular:  DP and PT pulses are palpable. CFT less than 3 seconds to all digits b/l. Skin temperature is warm to warm from proximal to distal b/l. Hair growth is noted. Mild edema to 1st MTPJ redness. No varicosities noted. Neuro:  Light touch intact b/l. Protective sensation intact at all pedal sites via [employer] 5.07 monofilament b/l. Proprioception intact at the hallux b/l. Derm:  Skin texture and turgor within normal limits. Toenails normal in appearance. Webspaces 1-4 clean, dry, intact b/l. No rashes, subcutaneous nodules, or open lesions noted. No hyperkeratotic tissue. Musc:  Decrease medial longitudinal arch. Muscle strength is 5/5 for all muscle groups including dorsiflexors, plantarflexors, everters, and inverters b/l. There is a bony prominence noted on the medial aspect of the first metatarsal head right foot. The hallux is in a laterally contracted and abducted position. There is pain to palpation of the prominent medial eminence. Local effusion is noted to the 1st MTP joint. Ankle joint ROM is decreased b/l, STJ, and MTJ ROM full without pain or crepitus b/l. Imaging: Moderate hallux valgus deformity with osteoarthritic changes noted to the first metatarsophalangeal joint. Osteoarthritic changes appreciated to the midfoot. Dell Rick is a 61 y.o. female with     Diagnosis Orders   1. Hallux valgus of right foot     2. Pain in both feet     3. Pes planus of both feet     4. Chronic pain in right foot          Plan    The patient was educated on clinical findings, diagnosis and treatment plans. Patient states that she understands all that has been explained and all questions were answered to her apparent satisfaction. The patient presented to our clinic today for right foot pain secondary to hallux valgus and pes planus deformity  We discussed her radiographs of the right foot and educated her on getting custom orthotics and possible surgery. There is low risk of morbidity from additional diagnostic testing or treatment. At this time we recommend moving forward with casting for custom orthotics and scheduling surgery for later next month. Other medical conditions considered during our medical decision making process were COPD, smoking, GERD, Vargas's esophagus.   Patient to return to clinic once her orthotics are in but in the

## 2021-10-28 DIAGNOSIS — K59.00 CONSTIPATION, UNSPECIFIED CONSTIPATION TYPE: ICD-10-CM

## 2021-10-28 RX ORDER — AMOXICILLIN 250 MG
CAPSULE ORAL
Qty: 30 TABLET | Refills: 3 | Status: SHIPPED | OUTPATIENT
Start: 2021-10-28 | End: 2021-12-13 | Stop reason: SDUPTHER

## 2021-10-28 NOTE — TELEPHONE ENCOUNTER
Please address the medication refill and close the encounter. If I can be of assistance, please route to the applicable pool. Thank you.       Last visit: 9-2-2021  Last Med refill: 9-2-2021  Does patient have enough medication for 72 hours: No:     Next Visit Date:  Future Appointments   Date Time Provider Zane Olive   11/2/2021 10:30 AM Jules Sanchez MD [facility] FP [facility]   12/9/2021 10:30 AM Jill Camargo MD [facility]   12/13/2021  9:30 AM [facility] PAT RM 2 [facility] PAT [facility]   12/15/2021  9:15 AM STA DIAG MAMMO RM 3 [facility] MAMMO [facility]iolog   1/3/2022 12:45 PM Elis Christensen DPM [facility]   1/25/2022 10:45 AM Joseph Harmon MD Resp Spec [address] Maintenance   Topic Date Due    Shingles Vaccine (1 of 2) Never done    Breast cancer screen  07/25/2019    Annual Wellness Visit (AWV)  01/26/2021    Flu vaccine (1) 09/01/2021    COVID-19 Vaccine (3 - Pfizer booster) 09/18/2021    Lipid screen  02/11/2022    Low dose CT lung screening  10/18/2022    DTaP/Tdap/Td vaccine (2 - Td or Tdap) 04/30/2024    Colon cancer screen colonoscopy  07/14/2026    Pneumococcal 0-64 years Vaccine (2 of 2 - PPSV23) 07/17/2029    Hepatitis C screen  Completed    HIV screen  Completed    Hepatitis A vaccine  Aged Out    Hepatitis B vaccine  Aged Out    Hib vaccine  Aged Out    Meningococcal (ACWY) vaccine  Aged Out       Hemoglobin A1C (%)   Date Value   05/11/2021 5.0   04/19/2018 5.5   02/12/2014 5.7             ( goal A1C is < 7)   No results found for: LABMICR  LDL Cholesterol (mg/dL)   Date Value   02/11/2021 99   02/18/2020 84       (goal LDL is <100)   AST (U/L)   Date Value   02/11/2021 20     ALT (U/L)   Date Value   02/11/2021 17     BUN (mg/dL)   Date Value   05/12/2021 11     BP Readings from Last 3 Encounters:   10/18/21 96/62   10/13/21 105/73   09/02/21 103/77          (goal 120/80)    All Future Testing planned in [facility]  Lab Frequency Next Occurrence   YOVANA Digital Screen Bilateral [[id number]] Once 09/02/2022               Patient Active Problem List:     Vargas's esophagus without dysplasia     Acne     Dental caries     Smoking     Hypoxia     Empyema (HCC)     GERD (gastroesophageal reflux disease)     H/O chronic respiratory failure     COPD (chronic obstructive pulmonary disease) (HCC)     Orthostatic hypotension     Medication refill     Seasonal allergies     Acne comedone     Bunion     Viral URI     High cholesterol     Constipation     Ear fullness, right     Bulimia nervosa, purging type     Gastroesophageal reflux disease with esophagitis     Chronic bilateral low back pain without sciatica     Gingivitis     Polyuria     Urinary frequency

## 2021-11-10 NOTE — PROGRESS NOTES
I performed a history and physical examination of the patient and discussed management with the resident. I reviewed the residents note and agree with the documented findings and plan of care. Any areas of disagreement are noted on the chart. I was personally present for the key portions of any procedures. I have documented in the chart those procedures where I was not present during the key portions. I have reviewed the Podiatry Resident progress note. I agree with the chief complaint, past medical history, past surgical history, allergies, medications, social and family history as documented unless otherwise noted below. Documentation of the HPI, Physical Exam and Medical Decision Making performed by medical students or scribes is based on my personal performance of the HPI, PE and MDM. I have personally evaluated this patient and have completed at least one if not all key elements of the E/M (history, physical exam, and MDM). Additional findings are as noted. Salyl Rowe DPM,D.P.M.

## 2021-11-11 DIAGNOSIS — L70.0 ACNE VULGARIS: ICD-10-CM

## 2021-11-12 RX ORDER — TAZAROTENE 1 MG/G
CREAM TOPICAL
Qty: 30 G | Refills: 11 | Status: SHIPPED | OUTPATIENT
Start: 2021-11-12 | End: 2021-12-08 | Stop reason: SDUPTHER

## 2021-12-08 ENCOUNTER — TELEPHONE (OUTPATIENT)
Dept: DERMATOLOGY | Age: 56
End: 2021-12-08

## 2021-12-08 DIAGNOSIS — L70.0 ACNE VULGARIS: ICD-10-CM

## 2021-12-08 RX ORDER — TAZAROTENE 1 MG/G
CREAM TOPICAL
Qty: 30 G | Refills: 5 | Status: SHIPPED | OUTPATIENT
Start: 2021-12-08 | End: 2022-06-29

## 2021-12-08 NOTE — TELEPHONE ENCOUNTER
Patient called into office to cancel appt due to she is sick/fever, she rescheduled appt(4/20/2022) but, mentioned   That she needs a med refill every month for acne cream(Jan,Feb,March), please advise.

## 2021-12-09 RX ORDER — SODIUM CHLORIDE, SODIUM LACTATE, POTASSIUM CHLORIDE, CALCIUM CHLORIDE 600; 310; 30; 20 MG/100ML; MG/100ML; MG/100ML; MG/100ML
1000 INJECTION, SOLUTION INTRAVENOUS CONTINUOUS
Status: CANCELLED | OUTPATIENT
Start: 2021-12-09

## 2021-12-13 ENCOUNTER — OFFICE VISIT (OUTPATIENT)
Dept: FAMILY MEDICINE CLINIC | Age: 56
End: 2021-12-13
Payer: MEDICARE

## 2021-12-13 ENCOUNTER — HOSPITAL ENCOUNTER (OUTPATIENT)
Dept: PREADMISSION TESTING | Age: 56
Discharge: HOME | End: 2021-12-17
Payer: MEDICARE

## 2021-12-13 VITALS
HEART RATE: 83 BPM | WEIGHT: 143 LBS | BODY MASS INDEX: 23.08 KG/M2 | DIASTOLIC BLOOD PRESSURE: 78 MMHG | SYSTOLIC BLOOD PRESSURE: 113 MMHG

## 2021-12-13 VITALS
HEART RATE: 83 BPM | WEIGHT: 143 LBS | HEIGHT: 66 IN | BODY MASS INDEX: 22.98 KG/M2 | DIASTOLIC BLOOD PRESSURE: 50 MMHG | TEMPERATURE: 98.4 F | RESPIRATION RATE: 18 BRPM | SYSTOLIC BLOOD PRESSURE: 93 MMHG | OXYGEN SATURATION: 96 %

## 2021-12-13 DIAGNOSIS — Z12.31 ENCOUNTER FOR SCREENING MAMMOGRAM FOR BREAST CANCER: ICD-10-CM

## 2021-12-13 DIAGNOSIS — G89.29 CHRONIC BILATERAL LOW BACK PAIN WITHOUT SCIATICA: ICD-10-CM

## 2021-12-13 DIAGNOSIS — Z00.00 ROUTINE GENERAL MEDICAL EXAMINATION AT A HEALTH CARE FACILITY: ICD-10-CM

## 2021-12-13 DIAGNOSIS — M54.50 CHRONIC BILATERAL LOW BACK PAIN WITHOUT SCIATICA: ICD-10-CM

## 2021-12-13 DIAGNOSIS — K59.00 CONSTIPATION, UNSPECIFIED CONSTIPATION TYPE: ICD-10-CM

## 2021-12-13 DIAGNOSIS — Z23 NEED FOR SHINGLES VACCINE: Primary | ICD-10-CM

## 2021-12-13 DIAGNOSIS — E78.00 HIGH CHOLESTEROL: ICD-10-CM

## 2021-12-13 DIAGNOSIS — Z23 NEED FOR IMMUNIZATION AGAINST INFLUENZA: ICD-10-CM

## 2021-12-13 DIAGNOSIS — K22.70 BARRETT'S ESOPHAGUS WITHOUT DYSPLASIA: ICD-10-CM

## 2021-12-13 LAB
BUN BLDV-MCNC: 12 MG/DL (ref 6–20)
CREAT SERPL-MCNC: 0.63 MG/DL (ref 0.5–0.9)
GFR AFRICAN AMERICAN: >60 ML/MIN
GFR NON-AFRICAN AMERICAN: >60 ML/MIN
GFR SERPL CREATININE-BSD FRML MDRD: NORMAL ML/MIN/{1.73_M2}
GFR SERPL CREATININE-BSD FRML MDRD: NORMAL ML/MIN/{1.73_M2}
GLUCOSE BLD-MCNC: 77 MG/DL (ref 70–99)

## 2021-12-13 PROCEDURE — 82565 ASSAY OF CREATININE: CPT

## 2021-12-13 PROCEDURE — 93005 ELECTROCARDIOGRAM TRACING: CPT | Performed by: ANESTHESIOLOGY

## 2021-12-13 PROCEDURE — G8482 FLU IMMUNIZE ORDER/ADMIN: HCPCS | Performed by: STUDENT IN AN ORGANIZED HEALTH CARE EDUCATION/TRAINING PROGRAM

## 2021-12-13 PROCEDURE — 90686 IIV4 VACC NO PRSV 0.5 ML IM: CPT | Performed by: FAMILY MEDICINE

## 2021-12-13 PROCEDURE — 3017F COLORECTAL CA SCREEN DOC REV: CPT | Performed by: STUDENT IN AN ORGANIZED HEALTH CARE EDUCATION/TRAINING PROGRAM

## 2021-12-13 PROCEDURE — 82947 ASSAY GLUCOSE BLOOD QUANT: CPT

## 2021-12-13 PROCEDURE — G0439 PPPS, SUBSEQ VISIT: HCPCS | Performed by: STUDENT IN AN ORGANIZED HEALTH CARE EDUCATION/TRAINING PROGRAM

## 2021-12-13 PROCEDURE — 84520 ASSAY OF UREA NITROGEN: CPT

## 2021-12-13 PROCEDURE — 36415 COLL VENOUS BLD VENIPUNCTURE: CPT

## 2021-12-13 RX ORDER — ATORVASTATIN CALCIUM 20 MG/1
20 TABLET, FILM COATED ORAL DAILY
Qty: 120 TABLET | Refills: 3 | Status: SHIPPED | OUTPATIENT
Start: 2021-12-13 | End: 2022-07-26 | Stop reason: SDUPTHER

## 2021-12-13 RX ORDER — AMOXICILLIN 250 MG
CAPSULE ORAL
Qty: 30 TABLET | Refills: 3 | Status: SHIPPED | OUTPATIENT
Start: 2021-12-13

## 2021-12-13 RX ORDER — ZOSTER VACCINE RECOMBINANT, ADJUVANTED 50 MCG/0.5
0.5 KIT INTRAMUSCULAR SEE ADMIN INSTRUCTIONS
Qty: 0.5 ML | Refills: 0 | Status: SHIPPED | OUTPATIENT
Start: 2021-12-13 | End: 2022-01-17

## 2021-12-13 RX ORDER — IBUPROFEN 800 MG/1
800 TABLET ORAL 2 TIMES DAILY PRN
Qty: 60 TABLET | Refills: 0 | Status: SHIPPED | OUTPATIENT
Start: 2021-12-13

## 2021-12-13 RX ORDER — VORTIOXETINE 10 MG/1
TABLET, FILM COATED ORAL
COMMUNITY
Start: 2021-12-07 | End: 2022-01-17

## 2021-12-13 RX ORDER — OMEPRAZOLE 40 MG/1
40 CAPSULE, DELAYED RELEASE ORAL DAILY
Qty: 60 CAPSULE | Refills: 3 | Status: SHIPPED | OUTPATIENT
Start: 2021-12-13 | End: 2022-07-26 | Stop reason: SDUPTHER

## 2021-12-13 ASSESSMENT — LIFESTYLE VARIABLES
HOW OFTEN DO YOU HAVE A DRINK CONTAINING ALCOHOL: 0
AUDIT-C TOTAL SCORE: INCOMPLETE
HOW OFTEN DO YOU HAVE A DRINK CONTAINING ALCOHOL: NEVER
AUDIT TOTAL SCORE: INCOMPLETE

## 2021-12-13 ASSESSMENT — PATIENT HEALTH QUESTIONNAIRE - PHQ9
SUM OF ALL RESPONSES TO PHQ QUESTIONS 1-9: 0
1. LITTLE INTEREST OR PLEASURE IN DOING THINGS: 0
SUM OF ALL RESPONSES TO PHQ QUESTIONS 1-9: 0
SUM OF ALL RESPONSES TO PHQ9 QUESTIONS 1 & 2: 0
2. FEELING DOWN, DEPRESSED OR HOPELESS: 0
SUM OF ALL RESPONSES TO PHQ QUESTIONS 1-9: 0

## 2021-12-13 NOTE — H&P
History and Physical    Pt Name: Argenis Patiño  MRN: [id number]  YOB: 1965  Date of evaluation: 12/13/2021  Primary Care Physician: Nathen Wood MD    SUBJECTIVE:   History of Chief Complaint:    Argenis Patiño is a 61 y.o. female who presents for PAT appointment. Patient complains of right great toe bunion for several years. Patient reports this bunion is nonpainful but difficult to put her right foot into shoes, due to deformity. She reports erythema to the right great toe and adds she was a  for several years and did not wear supportive shoes. Patient also states she has a bunion to her left great toe as well that is smaller but painful. Patient denies any numbness or tingling to either extremity and denies difficulty walking. Patient has been scheduled for SILVER BUNIONECTOMY GREAT TOE - Right. Allergies  is allergic to sesame [oleum sesami] and codeine. Medications  Prior to Admission medications    Medication Sig Start Date End Date Taking? Authorizing Provider   TRINTELLIX 10 MG TABS tablet  12/7/21  Yes Historical Provider, MD   tazarotene (AVAGE) 0.1 % cream Apply topically nightly.  12/8/21  Yes Natalie Nevarez MD   senna-docusate (RA P COL-RITE) 8.6-50 MG per tablet take 2 tablets by mouth once daily if needed for constipation 10/28/21  Yes Jamari Vicente MD   omeprazole (PRILOSEC) 40 MG delayed release capsule Take 1 capsule by mouth daily 9/2/21 12/31/21 Yes Dhaval Norton MD   ARNUITY ELLIPTA 200 MCG/ACT AEPB inhale 1 puff by mouth and INTO THE LUNGS once daily 7/26/21  Yes David Dimas MD   INCRUSE ELLIPTA 62.5 MCG/INH AEPB inhale 1 puff by mouth and INTO THE LUNGS once daily 7/26/21  Yes David Dimas MD   Cholecalciferol (VITAMIN D3) 5000 units TABS Take 1 tablet by mouth daily   Yes Historical Provider, MD   ferrous sulfate 325 (65 FE) MG tablet take 1 tablet by mouth once daily WITH BREAKFAST 5/22/17  Yes Gerhard Canas MD   TRINTELLIX 20 MG TABS tablet 30 mg  9/19/16 Yes Historical Provider, MD   atorvastatin (LIPITOR) 20 MG tablet Take 1 tablet by mouth daily 9/2/21   Sarah Fernandes MD   diphenhydrAMINE (BENADRYL) 25 MG capsule Take 2 capsules by mouth every 6 hours as needed for Itching 6/20/21   Severiano Sherman MD   famotidine (PEPCID) 20 MG tablet Take 1 tablet by mouth 2 times daily 6/20/21   Severiano Sherman MD   ibuprofen (ADVIL;MOTRIN) 800 MG tablet Take 1 tablet by mouth 2 times daily as needed for Pain 5/11/21   Maria Ines Arechiga MD   tiZANidine (ZANAFLEX) 4 MG tablet take 1 tablet by mouth three times a day 5/11/21   Maria Ines Arechiga MD   ondansetron (ZOFRAN) 4 MG tablet Take 1 tablet by mouth every 8 hours as needed for Nausea 10/21/20   Clara Gonzalez MD   aspirin 325 MG EC tablet Take 1 tablet by mouth daily 8/21/19 6/20/21  Edgar Kamara MD   RA ALLERGY RELIEF 10 MG tablet take 1 tablet by mouth once daily 12/15/18   Domenico Landry MD   EPINEPHrine (EPIPEN 2-CRUZ) 0.3 MG/0.3ML SOAJ injection Use as directed for allergic reaction 12/21/17   Jodi Soto MD   albuterol sulfate HFA (VENTOLIN HFA) 108 (90 Base) MCG/ACT inhaler Inhale 2 puffs into the lungs every 6 hours as needed for Wheezing 10/11/17   Becky Almeida MD     Past Medical History    has a past medical history of Acne, Vargas's esophagus, Bipolar disorder ([address] Bunion of great toe of left foot, Bunion of great toe of right foot, Chronic obstructive pulmonary disease (COPD) ([address] Depression, Dyspnea on exertion, Eating disorder, Empyema ([address] Fatigue due to depression, GERD (gastroesophageal reflux disease), H/O chronic respiratory failure, Moderate persistent asthma, Pneumonia, Seasonal allergies, Smoking, Substance abuse ([address] and Wellness examination. Past Surgical History   has a past surgical history that includes Hysterectomy (1988); Thoracoscopy (2/17/14); Upper gastrointestinal endoscopy (10/13/2014);  Endoscopy, colon, diagnostic; Upper gastrointestinal endoscopy (N/A, 11/10/2020); and Colonoscopy. Social History   reports that she quit smoking about 7 years ago. Her smoking use included cigarettes. She started smoking about 44 years ago. She has a 34.00 pack-year smoking history. She has never used smokeless tobacco.    reports previous alcohol use. reports no history of drug use. Marital Status single  Children 1  Occupation disability  Family History  Family Status   Relation Name Status    Mother      Father      MGM     Ruth Ann Baker       family history includes Asthma in her mother; Cancer in her maternal grandmother; Diabetes in her maternal uncle; Heart Attack in her father; Stroke in her father. Review of Systems:  CONSTITUTIONAL:   negative for fevers, chills, fatigue and malaise    EYES:   negative for double vision, blurred vision and photophobia    HEENT:   negative for tinnitus, epistaxis and sore throat     RESPIRATORY:   negative for cough, shortness of breath, wheezing     CARDIOVASCULAR:   negative for chest pain, palpitations, syncope, edema     GASTROINTESTINAL:   negative for nausea, vomiting     GENITOURINARY:   negative for incontinence     MUSCULOSKELETAL:   negative for neck or back pain reports left great toe pain; denies right great to pain   NEUROLOGICAL:   Negative for weakness and tingling  negative for headaches and dizziness     PSYCHIATRIC:   negative for anxiety       OBJECTIVE:   VITALS:  height is 5' 6\" (1.676 m) and weight is 143 lb (64.9 kg). Her infrared temperature is 98.4 °F (36.9 °C). Her blood pressure is 93/50 (abnormal) and her pulse is 83. Her respiration is 18 and oxygen saturation is 96%. CONSTITUTIONAL:alert & oriented x 3, no acute distress. Calm and pleasant. SKIN:  Warm and dry, no rashes to exposed areas of skin. HEAD:  Normocephalic, atraumatic. EYES: PERRL. EOMs intact. EARS:  Intact and equal bilaterally. No edema or thickening, without lumps, lesions, or discharge.  Hearing grossly WNL. NOSE:  Nares patent. No rhinorrhea   MOUTH/THROAT:  Mucous membranes pink and moist, uvula midline, teeth appear to be intact. NECK:supple, no lymphadenopathy  LUNGS: Respirations even and non-labored. Clear to auscultation bilaterally, no wheezes, rales, or rhonchi. CARDIOVASCULAR: Regular rate and rhythm, no murmurs/rubs/gallops   ABDOMEN: soft, non-tender, non-distended, bowel sounds active x 4   EXTREMITIES: No edema to bilateral lower extremities. No varicosities to bilateral lower extremities. Right great toe with deformity and proximal erythema. NEUROLOGIC: CN II-XII are grossly intact. Gait is smooth, rhythmic and effortless.   Testing:   EK2021  Labs pending: drawn 2021   IMPRESSIONS:   HAV (Bunion of RT Hallux)  PLANS:   SILVER BUNIONECTOMY GREAT TOE - Right    Rossy ASMITA Osborn - CNP  Electronically signed 2021 at 11:55 AM

## 2021-12-13 NOTE — PATIENT INSTRUCTIONS
Personalized Preventive Plan for Chandu Ambrosio - 12/13/2021  Medicare offers a range of preventive health benefits. Some of the tests and screenings are paid in full while other may be subject to a deductible, co-insurance, and/or copay. Some of these benefits include a comprehensive review of your medical history including lifestyle, illnesses that may run in your family, and various assessments and screenings as appropriate. After reviewing your medical record and screening and assessments performed today your provider may have ordered immunizations, labs, imaging, and/or referrals for you. A list of these orders (if applicable) as well as your Preventive Care list are included within your After Visit Summary for your review. Other Preventive Recommendations:    · A preventive eye exam performed by an eye specialist is recommended every 1-2 years to screen for glaucoma; cataracts, macular degeneration, and other eye disorders. · A preventive dental visit is recommended every 6 months. · Try to get at least 150 minutes of exercise per week or 10,000 steps per day on a pedometer . · Order or download the FREE \"Exercise & Physical Activity: Your Everyday Guide\" from [employer]. Call 6-189.713.7472 or search [employer] online. · You need 3630-3325 mg of calcium and 3674-8137 IU of vitamin D per day. It is possible to meet your calcium requirement with diet alone, but a vitamin D supplement is usually necessary to meet this goal.  · When exposed to the sun, use a sunscreen that protects against both UVA and UVB radiation with an SPF of 30 or greater. Reapply every 2 to 3 hours or after sweating, drying off with a towel, or swimming. · Always wear a seat belt when traveling in a car. Always wear a helmet when riding a bicycle or motorcycle.

## 2021-12-13 NOTE — H&P (VIEW-ONLY)
History and Physical    Pt Name: Dax Buckner  MRN: [id number]  YOB: 1965  Date of evaluation: 12/13/2021  Primary Care Physician: Edita Billy MD    SUBJECTIVE:   History of Chief Complaint:    Dax Buckner is a 61 y.o. female who presents for PAT appointment. Patient complains of right great toe bunion for several years. Patient reports this bunion is nonpainful but difficult to put her right foot into shoes, due to deformity. She reports erythema to the right great toe and adds she was a  for several years and did not wear supportive shoes. Patient also states she has a bunion to her left great toe as well that is smaller but painful. Patient denies any numbness or tingling to either extremity and denies difficulty walking. Patient has been scheduled for SILVER BUNIONECTOMY GREAT TOE - Right. Allergies  is allergic to sesame [oleum sesami] and codeine. Medications  Prior to Admission medications    Medication Sig Start Date End Date Taking? Authorizing Provider   TRINTELLIX 10 MG TABS tablet  12/7/21  Yes Historical Provider, MD   tazarotene (AVAGE) 0.1 % cream Apply topically nightly.  12/8/21  Yes Oskar Stoddard MD   senna-docusate (RA P COL-RITE) 8.6-50 MG per tablet take 2 tablets by mouth once daily if needed for constipation 10/28/21  Yes Luly Cano MD   omeprazole (PRILOSEC) 40 MG delayed release capsule Take 1 capsule by mouth daily 9/2/21 12/31/21 Yes Toya Dudley MD   ARNUITY ELLIPTA 200 MCG/ACT AEPB inhale 1 puff by mouth and INTO THE LUNGS once daily 7/26/21  Yes Joseph Yan MD   INCRUSE ELLIPTA 62.5 MCG/INH AEPB inhale 1 puff by mouth and INTO THE LUNGS once daily 7/26/21  Yes Joseph Yan MD   Cholecalciferol (VITAMIN D3) 5000 units TABS Take 1 tablet by mouth daily   Yes Historical Provider, MD   ferrous sulfate 325 (65 FE) MG tablet take 1 tablet by mouth once daily WITH BREAKFAST 5/22/17  Yes Suleiman Benito MD   TRINTELLIX 20 MG TABS tablet 30 mg  9/19/16 Yes Historical Provider, MD   atorvastatin (LIPITOR) 20 MG tablet Take 1 tablet by mouth daily 9/2/21   Vania Beard MD   diphenhydrAMINE (BENADRYL) 25 MG capsule Take 2 capsules by mouth every 6 hours as needed for Itching 6/20/21   Jacoby Eugene MD   famotidine (PEPCID) 20 MG tablet Take 1 tablet by mouth 2 times daily 6/20/21   Jacoby Eugene MD   ibuprofen (ADVIL;MOTRIN) 800 MG tablet Take 1 tablet by mouth 2 times daily as needed for Pain 5/11/21   Lennox Ulrich MD   tiZANidine (ZANAFLEX) 4 MG tablet take 1 tablet by mouth three times a day 5/11/21   Lennox Ulrich MD   ondansetron (ZOFRAN) 4 MG tablet Take 1 tablet by mouth every 8 hours as needed for Nausea 10/21/20   Juan Owen MD   aspirin 325 MG EC tablet Take 1 tablet by mouth daily 8/21/19 6/20/21  Torri Morris MD   RA ALLERGY RELIEF 10 MG tablet take 1 tablet by mouth once daily 12/15/18   Bernard Ortega MD   EPINEPHrine (EPIPEN 2-CRUZ) 0.3 MG/0.3ML SOAJ injection Use as directed for allergic reaction 12/21/17   Ana Nunn MD   albuterol sulfate HFA (VENTOLIN HFA) 108 (90 Base) MCG/ACT inhaler Inhale 2 puffs into the lungs every 6 hours as needed for Wheezing 10/11/17   Kenneth Arnold MD     Past Medical History    has a past medical history of Acne, Vargas's esophagus, Bipolar disorder ([address] Bunion of great toe of left foot, Bunion of great toe of right foot, Chronic obstructive pulmonary disease (COPD) ([address] Depression, Dyspnea on exertion, Eating disorder, Empyema ([address] Fatigue due to depression, GERD (gastroesophageal reflux disease), H/O chronic respiratory failure, Moderate persistent asthma, Pneumonia, Seasonal allergies, Smoking, Substance abuse ([address] and Wellness examination. Past Surgical History   has a past surgical history that includes Hysterectomy (1988); Thoracoscopy (2/17/14); Upper gastrointestinal endoscopy (10/13/2014);  Endoscopy, colon, diagnostic; Upper gastrointestinal endoscopy (N/A, 11/10/2020); and Colonoscopy. Social History   reports that she quit smoking about 7 years ago. Her smoking use included cigarettes. She started smoking about 44 years ago. She has a 34.00 pack-year smoking history. She has never used smokeless tobacco.    reports previous alcohol use. reports no history of drug use. Marital Status single  Children 1  Occupation disability  Family History  Family Status   Relation Name Status    Mother      Father      MGM     Mookie Prior       family history includes Asthma in her mother; Cancer in her maternal grandmother; Diabetes in her maternal uncle; Heart Attack in her father; Stroke in her father. Review of Systems:  CONSTITUTIONAL:   negative for fevers, chills, fatigue and malaise    EYES:   negative for double vision, blurred vision and photophobia    HEENT:   negative for tinnitus, epistaxis and sore throat     RESPIRATORY:   negative for cough, shortness of breath, wheezing     CARDIOVASCULAR:   negative for chest pain, palpitations, syncope, edema     GASTROINTESTINAL:   negative for nausea, vomiting     GENITOURINARY:   negative for incontinence     MUSCULOSKELETAL:   negative for neck or back pain reports left great toe pain; denies right great to pain   NEUROLOGICAL:   Negative for weakness and tingling  negative for headaches and dizziness     PSYCHIATRIC:   negative for anxiety       OBJECTIVE:   VITALS:  height is 5' 6\" (1.676 m) and weight is 143 lb (64.9 kg). Her infrared temperature is 98.4 °F (36.9 °C). Her blood pressure is 93/50 (abnormal) and her pulse is 83. Her respiration is 18 and oxygen saturation is 96%. CONSTITUTIONAL:alert & oriented x 3, no acute distress. Calm and pleasant. SKIN:  Warm and dry, no rashes to exposed areas of skin. HEAD:  Normocephalic, atraumatic. EYES: PERRL. EOMs intact. EARS:  Intact and equal bilaterally. No edema or thickening, without lumps, lesions, or discharge.  Hearing grossly WNL. NOSE:  Nares patent. No rhinorrhea   MOUTH/THROAT:  Mucous membranes pink and moist, uvula midline, teeth appear to be intact. NECK:supple, no lymphadenopathy  LUNGS: Respirations even and non-labored. Clear to auscultation bilaterally, no wheezes, rales, or rhonchi. CARDIOVASCULAR: Regular rate and rhythm, no murmurs/rubs/gallops   ABDOMEN: soft, non-tender, non-distended, bowel sounds active x 4   EXTREMITIES: No edema to bilateral lower extremities. No varicosities to bilateral lower extremities. Right great toe with deformity and proximal erythema. NEUROLOGIC: CN II-XII are grossly intact. Gait is smooth, rhythmic and effortless.   Testing:   [id number]  Labs pending: drawn 2021   IMPRESSIONS:   HAV (Bunion of RT Hallux)  PLANS:   SILVER BUNIONECTOMY GREAT TOE - Right    Flora Divers, APRN - CNP  Electronically signed 2021 at 11:55 AM

## 2021-12-13 NOTE — PROGRESS NOTES
Tobacco Cessation Counseling: Patient advised about behavior change, including information about personal health harms, usage of appropriate cessation measures and benefits of cessation. Time spent (minutes): 5     Medicare Annual Wellness Visit  Name: Neena Duque Date: 2021   MRN: [id number] Sex: Female   Age: 61 y.o. Ethnicity: Non- / Non    : 1965 Race: White (non-)      Verona Larry is here for Medicare AWV    Screenings for behavioral, psychosocial and functional/safety risks, and cognitive dysfunction are all negative except as indicated below. These results, as well as other patient data from the [address] form, are documented in Flowsheets linked to this Encounter. Allergies   Allergen Reactions    Sesame [Oleum Sesami] Anaphylaxis     Sesame seeds    Codeine Nausea And Vomiting       Prior to Visit Medications    Medication Sig Taking? Authorizing Provider   TRINTELLIX 10 MG TABS tablet  Yes Historical Provider, MD   atorvastatin (LIPITOR) 20 MG tablet Take 1 tablet by mouth daily Yes Britney Mallory MD   omeprazole (PRILOSEC) 40 MG delayed release capsule Take 1 capsule by mouth daily Yes Britney aMllory MD   ibuprofen (ADVIL;MOTRIN) 800 MG tablet Take 1 tablet by mouth 2 times daily as needed for Pain Yes Britney Mallory MD   senna-docusate (RA P COL-RITE) 8.6-50 MG per tablet take 2 tablets by mouth once daily if needed for constipation Yes Britney Mallory MD   zoster recombinant adjuvanted vaccine [facility]) 50 MCG/0.5ML SUSR injection Inject 0.5 mLs into the muscle See Admin Instructions 1 dose now and repeat in 2-6 months Yes Britney Mallory MD   tazarotene (AVAGE) 0.1 % cream Apply topically nightly.  Yes Inder Vilelda MD   ARNUITY ELLIPTA 200 MCG/ACT AEPB inhale 1 puff by mouth and INTO THE LUNGS once daily Yes Ramesh Lemon MD   INCRUSE ELLIPTA 62.5 MCG/INH AEPB inhale 1 puff by mouth and INTO THE LUNGS once daily Yes Ramesh Lemon MD diphenhydrAMINE (BENADRYL) 25 MG capsule Take 2 capsules by mouth every 6 hours as needed for Itching Yes Octaviano Chicas MD   famotidine (PEPCID) 20 MG tablet Take 1 tablet by mouth 2 times daily Yes Octaviano Chicas MD   tiZANidine (ZANAFLEX) 4 MG tablet take 1 tablet by mouth three times a day Yes Calvin Arzola MD   ondansetron (ZOFRAN) 4 MG tablet Take 1 tablet by mouth every 8 hours as needed for Nausea Yes Ena Primrose, MD   RA ALLERGY RELIEF 10 MG tablet take 1 tablet by mouth once daily Yes Nighat Faria MD   EPINEPHrine (EPIPEN 2-CRUZ) 0.3 MG/0.3ML SOAJ injection Use as directed for allergic reaction Yes Azra Dean MD   albuterol sulfate HFA (VENTOLIN HFA) 108 (90 Base) MCG/ACT inhaler Inhale 2 puffs into the lungs every 6 hours as needed for Wheezing Yes Fransisca Montague MD   Cholecalciferol (VITAMIN D3) 5000 units TABS Take 1 tablet by mouth daily Yes Historical Provider, MD   ferrous sulfate 325 (65 FE) MG tablet take 1 tablet by mouth once daily WITH BREAKFAST Yes Azra Dean MD   TRINTELLIX 20 MG TABS tablet 30 mg  Yes Historical Provider, MD   aspirin 325 MG EC tablet Take 1 tablet by mouth daily  Azucena León MD       Past Medical History:   Diagnosis Date    Acne     Dr. Prasanna Gomez Dermatology. annual visits    Vargas's esophagus     for 20 yrs on and off. [address]    Bipolar disorder [facility])     recently diagnosed and placed on meds. No longer an issue since sobriety    Bunion of great toe of left foot     Bunion of great toe of right foot     Dr. [facility]    Chronic obstructive pulmonary disease (COPD) ([facility]     Dr. Kiesha Barcenas Pulmonology. last visit 7/2021    Depression     On Meds. Dr. Trenton Spann on [address] Dyspnea on exertion     Eating disorder     bulemia    Empyema ([facility] 02/17/2014    left.  ICU hospitalization 2/2014 for 3 weeks    Fatigue due to depression     GERD (gastroesophageal reflux disease)     H/O chronic respiratory failure  Moderate persistent asthma     Pneumonia 02/2014    3 week hospitalization    Seasonal allergies     Smoking     Substance abuse ([facility]     quit 02/2014    Wellness examination 09/2021    [facility]. next appointment 12/13/2021       Past Surgical History:   Procedure Laterality Date    COLONOSCOPY      ENDOSCOPY, COLON, DIAGNOSTIC      HYSTERECTOMY  1988    THORACOSCOPY  2/17/14    Lt. VATS w/complete decortication    UPPER GASTROINTESTINAL ENDOSCOPY  10/13/2014    GE junction biopsy    UPPER GASTROINTESTINAL ENDOSCOPY N/A 11/10/2020    EGD BIOPSY performed by Janice Kellogg MD at [address] History   Problem Relation Age of Onset    Asthma Mother     Stroke Father     Heart Attack Father     Cancer Maternal Grandmother         skin cancer    Diabetes Maternal Uncle        CareTeam (Including outside providers/suppliers regularly involved in providing care):   Patient Care Team:  Kacie Aguilera MD as PCP - General (Emergency Medicine)  Andrew Pressley MD as PCP - [facility]  Dora Perez MD as Surgeon (Cardiothoracic Surgery)  Rogers Ramirez MD as Consulting Physician (Pulmonology)  [facility] Katelyn Pulliam RN as Nurse Navigator    Wt Readings from Last 3 Encounters:   12/13/21 143 lb (64.9 kg)   12/13/21 143 lb (64.9 kg)   10/18/21 144 lb (65.3 kg)     Vitals:    12/13/21 1443   BP: 113/78   Site: Left Upper Arm   Position: Sitting   Cuff Size: Medium Adult   Pulse: 83   Weight: 143 lb (64.9 kg)     Body mass index is 23.08 kg/m². Based upon direct observation of the patient, evaluation of cognition reveals recent and remote memory intact.     General Appearance: alert and oriented to person, place and time, well developed and well- nourished, in no acute distress  Skin: warm and dry, no rash or erythema  Head: normocephalic and atraumatic  Eyes: pupils equal, round, and reactive to light, extraocular eye movements intact, conjunctivae normal  ENT: tympanic membrane, external ear and ear canal normal bilaterally, nose without deformity, nasal mucosa and turbinates normal without polyps  Neck: supple and non-tender without mass, no thyromegaly or thyroid nodules, no cervical lymphadenopathy  Pulmonary/Chest: clear to auscultation bilaterally- no wheezes, rales or rhonchi, normal air movement, no respiratory distress  Cardiovascular: normal rate, regular rhythm, normal S1 and S2, no murmurs, rubs, clicks, or gallops, distal pulses intact, no carotid bruits  Abdomen: soft, non-tender, non-distended, normal bowel sounds, no masses or organomegaly  Extremities: no cyanosis, clubbing or edema  Musculoskeletal: normal range of motion, no joint swelling, deformity or tenderness  Neurologic: reflexes normal and symmetric, no cranial nerve deficit, gait, coordination and speech normal      Patient's complete Health Risk Assessment and screening values have been reviewed and are found in Flowsheets. The following problems were reviewed today and where indicated follow up appointments were made and/or referrals ordered. Positive Risk Factor Screenings with Interventions:          General Health and ACP:  General  In general, how would you say your health is?: Good  In the past 7 days, have you experienced any of the following?  New or Increased Pain, New or Increased Fatigue, Loneliness, Social Isolation, Stress or Anger?: None of These  Do you get the social and emotional support that you need?: Yes  Do you have a Living Will?: (!) No  Advance Directives     Power of [address] [place] [address] Will ACP-Advance Directive ACP-Power of     Not on File Not on File Not on File Not on File      General Health Risk Interventions:  ·      Hearing/Vision:  No exam data present  Hearing/Vision  Do you or your family notice any trouble with your hearing that hasn't been managed with hearing aids?: No  Do you have difficulty driving, watching TV, or doing any of your daily activities because of your eyesight?: No  Have you had an eye exam within the past year?: (!) No  Hearing/Vision Interventions:  · None     Safety:  Safety  Do you have working smoke detectors?: Yes  Have all throw rugs been removed or fastened?: Yes  Do you have non-slip mats or surfaces in all bathtubs/showers?: Yes  Do all of your stairways have a railing or banister?: (!) No  Are your doorways, halls and stairs free of clutter?: Yes  Do you always fasten your seatbelt when you are in a car?: Yes  Safety Interventions:  · Home safety tips provided     Personalized Preventive Plan   Current Health Maintenance Status  Immunization History   Administered Date(s) Administered    COVID-19, Santiago Peter, PF, 30mcg/0.3mL 02/25/2021, 03/18/2021    Influenza Virus Vaccine 02/13/2014    Influenza, Rejeana Fort, IM, (6 mo and older Fluzone, Flulaval, Fluarix and 3 yrs and older Afluria) 12/21/2017    Influenza, Quadv, IM, PF (6 mo and older Fluzone, Flulaval, Fluarix, and 3 yrs and older Afluria) 11/20/2018, 01/03/2020, 12/08/2020    Influenza, Triv, 3 Years and older, IM (Afluria (5 yrs and older) 11/08/2016    Pneumococcal Conjugate 13-valent ([id number]) 11/08/2016    Pneumococcal Polysaccharide ([id number]) 02/13/2014    Tdap (Boostrix, Adacel) 04/30/2014        Health Maintenance   Topic Date Due    Shingles Vaccine (1 of 2) Never done    Breast cancer screen  07/25/2019    Annual Wellness Visit (AWV)  01/26/2021    Flu vaccine (1) 09/01/2021    COVID-19 Vaccine (3 - Booster for Pfizer series) 09/18/2021    Lipid screen  02/11/2022    Low dose CT lung screening  10/18/2022    DTaP/Tdap/Td vaccine (2 - Td or Tdap) 04/30/2024    Colon cancer screen colonoscopy  07/14/2026    Pneumococcal 0-64 years Vaccine (2 of 2 - PPSV23) 07/17/2029    Hepatitis C screen  Completed    HIV screen  Completed    Hepatitis A vaccine  Aged Out    Hepatitis B vaccine  Aged Out    Hib vaccine  Aged Out    Meningococcal (ACWY) vaccine  Aged Out     Recommendations for SEAT 4a Due: see orders and patient instructions/AVS.  . Recommended screening schedule for the next 5-10 years is provided to the patient in written form: see Patient Geovanna Reyes was seen today for medicare awv. Diagnoses and all orders for this visit:    Need for shingles vaccine  -     zoster recombinant adjuvanted vaccine [facility]) 50 MCG/0.5ML SUSR injection; Inject 0.5 mLs into the muscle See Admin Instructions 1 dose now and repeat in 2-6 months    High cholesterol  -     atorvastatin (LIPITOR) 20 MG tablet; Take 1 tablet by mouth daily    Vargas's esophagus without dysplasia  -     omeprazole (PRILOSEC) 40 MG delayed release capsule; Take 1 capsule by mouth daily    Chronic bilateral low back pain without sciatica  -     ibuprofen (ADVIL;MOTRIN) 800 MG tablet; Take 1 tablet by mouth 2 times daily as needed for Pain    Constipation, unspecified constipation type  -     senna-docusate (RA P COL-RITE) 8.6-50 MG per tablet; take 2 tablets by mouth once daily if needed for constipation    Encounter for screening mammogram for breast cancer  -     [facility] Digital Screen Bilateral [[id number]]; Future    Routine general medical examination at a health care facility              Advance Care Planning   Advanced Care Planning: Discussed the patients choices for care and treatment in case of a health event that adversely affects decision-making abilities. Also discussed the patients long-term treatment options. Reviewed with the patient the [address] [place] [address] of [address] [place] [address] Declaration forms  Reviewed the process of designating a competent adult as an Agent (or -in-fact) that could take make health care decisions for the patient if incompetent. Patient was asked to complete the declaration forms, either acknowledge the forms by a public notary or an eligible witness and provide a signed copy to the practice office.   Time

## 2021-12-13 NOTE — PROGRESS NOTES
hospitalization    Seasonal allergies     Smoking     Substance abuse ([facility]     quit 02/2014    Wellness examination 09/2021    [facility]. next appointment 12/13/2021         Patient was evaluated in PAT & anesthesia guidelines were applied. NPO guidelines, medication instructions and scheduled arrival time were reviewed with patient. Anesthesia contacted:   Yes    I spoke with Dr. Fazrana Crow. Patient history and pertinent findings reviewed. Patient with history of chronic respiratory failure, COPD, asthma follows with pulmonology, last office notes on file from 7/2021. Routine CT lung from 10/2021 indicates no change in previous pulmonary nodule. Patient reports she will at times become short of breath but with moderate exertion which is chronic, denies chest pain. Also, PAT EKG today is abnormal with interval change from EKG from 10/2020.     Medical or cardiac clearance ordered: ASMITA Gore CNP   12/13/21  12:10 PM

## 2021-12-27 ENCOUNTER — HOSPITAL ENCOUNTER (OUTPATIENT)
Age: 56
Setting detail: OUTPATIENT SURGERY
Discharge: HOME | End: 2021-12-27
Attending: PODIATRIST | Admitting: PODIATRIST
Payer: MEDICARE

## 2021-12-27 ENCOUNTER — ANESTHESIA (OUTPATIENT)
Dept: OPERATING ROOM | Age: 56
End: 2021-12-27
Payer: MEDICARE

## 2021-12-27 ENCOUNTER — ANESTHESIA EVENT (OUTPATIENT)
Dept: OPERATING ROOM | Age: 56
End: 2021-12-27
Payer: MEDICARE

## 2021-12-27 ENCOUNTER — APPOINTMENT (OUTPATIENT)
Dept: GENERAL RADIOLOGY | Age: 56
End: 2021-12-27
Attending: PODIATRIST
Payer: MEDICARE

## 2021-12-27 VITALS
WEIGHT: 143.74 LBS | HEART RATE: 68 BPM | SYSTOLIC BLOOD PRESSURE: 107 MMHG | BODY MASS INDEX: 23.1 KG/M2 | RESPIRATION RATE: 18 BRPM | HEIGHT: 66 IN | TEMPERATURE: 97.9 F | OXYGEN SATURATION: 100 % | DIASTOLIC BLOOD PRESSURE: 75 MMHG

## 2021-12-27 VITALS — DIASTOLIC BLOOD PRESSURE: 69 MMHG | OXYGEN SATURATION: 100 % | TEMPERATURE: 96.4 F | SYSTOLIC BLOOD PRESSURE: 95 MMHG

## 2021-12-27 DIAGNOSIS — M20.11 HALLUX ABDUCTO VALGUS, RIGHT: ICD-10-CM

## 2021-12-27 DIAGNOSIS — M79.671 PAIN IN RIGHT FOOT: ICD-10-CM

## 2021-12-27 DIAGNOSIS — G89.18 POST-OPERATIVE PAIN: Primary | ICD-10-CM

## 2021-12-27 PROCEDURE — 2720000010 HC SURG SUPPLY STERILE: Performed by: PODIATRIST

## 2021-12-27 PROCEDURE — 28292 COR HLX VLGS RSC PRX PHLX BS: CPT | Performed by: PODIATRIST

## 2021-12-27 PROCEDURE — 3700000000 HC ANESTHESIA ATTENDED CARE: Performed by: PODIATRIST

## 2021-12-27 PROCEDURE — 7100000000 HC PACU RECOVERY - FIRST 15 MIN: Performed by: PODIATRIST

## 2021-12-27 PROCEDURE — 6360000002 HC RX W HCPCS: Performed by: NURSE ANESTHETIST, CERTIFIED REGISTERED

## 2021-12-27 PROCEDURE — 2500000003 HC RX 250 WO HCPCS: Performed by: PODIATRIST

## 2021-12-27 PROCEDURE — 7100000001 HC PACU RECOVERY - ADDTL 15 MIN: Performed by: PODIATRIST

## 2021-12-27 PROCEDURE — 7100000040 HC SPAR PHASE II RECOVERY - FIRST 15 MIN: Performed by: PODIATRIST

## 2021-12-27 PROCEDURE — 2500000003 HC RX 250 WO HCPCS: Performed by: NURSE ANESTHETIST, CERTIFIED REGISTERED

## 2021-12-27 PROCEDURE — 3700000001 HC ADD 15 MINUTES (ANESTHESIA): Performed by: PODIATRIST

## 2021-12-27 PROCEDURE — 2709999900 HC NON-CHARGEABLE SUPPLY: Performed by: PODIATRIST

## 2021-12-27 PROCEDURE — 73630 X-RAY EXAM OF FOOT: CPT

## 2021-12-27 PROCEDURE — 2580000003 HC RX 258: Performed by: NURSE ANESTHETIST, CERTIFIED REGISTERED

## 2021-12-27 PROCEDURE — 2580000003 HC RX 258: Performed by: ANESTHESIOLOGY

## 2021-12-27 PROCEDURE — 3600000004 HC SURGERY LEVEL 4 BASE: Performed by: PODIATRIST

## 2021-12-27 PROCEDURE — 3600000014 HC SURGERY LEVEL 4 ADDTL 15MIN: Performed by: PODIATRIST

## 2021-12-27 PROCEDURE — 2580000003 HC RX 258: Performed by: PODIATRIST

## 2021-12-27 RX ORDER — FENTANYL CITRATE 50 UG/ML
INJECTION, SOLUTION INTRAMUSCULAR; INTRAVENOUS PRN
Status: DISCONTINUED | OUTPATIENT
Start: 2021-12-27 | End: 2021-12-27 | Stop reason: SDUPTHER

## 2021-12-27 RX ORDER — LIDOCAINE HYDROCHLORIDE 10 MG/ML
INJECTION, SOLUTION EPIDURAL; INFILTRATION; INTRACAUDAL; PERINEURAL PRN
Status: DISCONTINUED | OUTPATIENT
Start: 2021-12-27 | End: 2021-12-27 | Stop reason: SDUPTHER

## 2021-12-27 RX ORDER — CEFAZOLIN SODIUM 2 G/50ML
SOLUTION INTRAVENOUS PRN
Status: DISCONTINUED | OUTPATIENT
Start: 2021-12-27 | End: 2021-12-27 | Stop reason: SDUPTHER

## 2021-12-27 RX ORDER — SODIUM CHLORIDE, SODIUM LACTATE, POTASSIUM CHLORIDE, CALCIUM CHLORIDE 600; 310; 30; 20 MG/100ML; MG/100ML; MG/100ML; MG/100ML
INJECTION, SOLUTION INTRAVENOUS CONTINUOUS PRN
Status: DISCONTINUED | OUTPATIENT
Start: 2021-12-27 | End: 2021-12-27 | Stop reason: SDUPTHER

## 2021-12-27 RX ORDER — HYDROCODONE BITARTRATE AND ACETAMINOPHEN 5; 325 MG/1; MG/1
1 TABLET ORAL EVERY 6 HOURS PRN
Qty: 28 TABLET | Refills: 0 | Status: SHIPPED | OUTPATIENT
Start: 2021-12-27 | End: 2022-01-03

## 2021-12-27 RX ORDER — HYDRALAZINE HYDROCHLORIDE 20 MG/ML
5 INJECTION INTRAMUSCULAR; INTRAVENOUS EVERY 10 MIN PRN
Status: DISCONTINUED | OUTPATIENT
Start: 2021-12-27 | End: 2021-12-27 | Stop reason: HOSPADM

## 2021-12-27 RX ORDER — METOCLOPRAMIDE HYDROCHLORIDE 5 MG/ML
10 INJECTION INTRAMUSCULAR; INTRAVENOUS
Status: DISCONTINUED | OUTPATIENT
Start: 2021-12-27 | End: 2021-12-27 | Stop reason: HOSPADM

## 2021-12-27 RX ORDER — DIPHENHYDRAMINE HYDROCHLORIDE 50 MG/ML
12.5 INJECTION INTRAMUSCULAR; INTRAVENOUS
Status: DISCONTINUED | OUTPATIENT
Start: 2021-12-27 | End: 2021-12-27 | Stop reason: HOSPADM

## 2021-12-27 RX ORDER — KETOROLAC TROMETHAMINE 30 MG/ML
INJECTION, SOLUTION INTRAMUSCULAR; INTRAVENOUS PRN
Status: DISCONTINUED | OUTPATIENT
Start: 2021-12-27 | End: 2021-12-27 | Stop reason: SDUPTHER

## 2021-12-27 RX ORDER — ONDANSETRON 4 MG/1
4 TABLET, FILM COATED ORAL EVERY 8 HOURS PRN
Qty: 20 TABLET | Refills: 0 | Status: SHIPPED | OUTPATIENT
Start: 2021-12-27 | End: 2022-07-26

## 2021-12-27 RX ORDER — MAGNESIUM HYDROXIDE 1200 MG/15ML
LIQUID ORAL CONTINUOUS PRN
Status: COMPLETED | OUTPATIENT
Start: 2021-12-27 | End: 2021-12-27

## 2021-12-27 RX ORDER — MEPERIDINE HYDROCHLORIDE 50 MG/ML
12.5 INJECTION INTRAMUSCULAR; INTRAVENOUS; SUBCUTANEOUS EVERY 5 MIN PRN
Status: DISCONTINUED | OUTPATIENT
Start: 2021-12-27 | End: 2021-12-27 | Stop reason: HOSPADM

## 2021-12-27 RX ORDER — GLYCOPYRROLATE 1 MG/5 ML
SYRINGE (ML) INTRAVENOUS PRN
Status: DISCONTINUED | OUTPATIENT
Start: 2021-12-27 | End: 2021-12-27 | Stop reason: SDUPTHER

## 2021-12-27 RX ORDER — HYDROCODONE BITARTRATE AND ACETAMINOPHEN 5; 325 MG/1; MG/1
1 TABLET ORAL
Status: DISCONTINUED | OUTPATIENT
Start: 2021-12-27 | End: 2021-12-27 | Stop reason: HOSPADM

## 2021-12-27 RX ORDER — ONDANSETRON 2 MG/ML
INJECTION INTRAMUSCULAR; INTRAVENOUS PRN
Status: DISCONTINUED | OUTPATIENT
Start: 2021-12-27 | End: 2021-12-27 | Stop reason: SDUPTHER

## 2021-12-27 RX ORDER — SODIUM CHLORIDE, SODIUM LACTATE, POTASSIUM CHLORIDE, CALCIUM CHLORIDE 600; 310; 30; 20 MG/100ML; MG/100ML; MG/100ML; MG/100ML
1000 INJECTION, SOLUTION INTRAVENOUS CONTINUOUS
Status: DISCONTINUED | OUTPATIENT
Start: 2021-12-27 | End: 2021-12-27 | Stop reason: HOSPADM

## 2021-12-27 RX ORDER — DEXAMETHASONE SODIUM PHOSPHATE 10 MG/ML
INJECTION INTRAMUSCULAR; INTRAVENOUS PRN
Status: DISCONTINUED | OUTPATIENT
Start: 2021-12-27 | End: 2021-12-27 | Stop reason: SDUPTHER

## 2021-12-27 RX ORDER — PROMETHAZINE HYDROCHLORIDE 25 MG/ML
6.25 INJECTION, SOLUTION INTRAMUSCULAR; INTRAVENOUS
Status: DISCONTINUED | OUTPATIENT
Start: 2021-12-27 | End: 2021-12-27 | Stop reason: HOSPADM

## 2021-12-27 RX ORDER — PROPOFOL 10 MG/ML
INJECTION, EMULSION INTRAVENOUS PRN
Status: DISCONTINUED | OUTPATIENT
Start: 2021-12-27 | End: 2021-12-27 | Stop reason: SDUPTHER

## 2021-12-27 RX ADMIN — SODIUM CHLORIDE, POTASSIUM CHLORIDE, SODIUM LACTATE AND CALCIUM CHLORIDE: 600; 310; 30; 20 INJECTION, SOLUTION INTRAVENOUS at 08:26

## 2021-12-27 RX ADMIN — DEXAMETHASONE SODIUM PHOSPHATE 10 MG: 10 INJECTION INTRAMUSCULAR; INTRAVENOUS at 08:47

## 2021-12-27 RX ADMIN — CEFAZOLIN SODIUM 2000 MG: 2 SOLUTION INTRAVENOUS at 08:40

## 2021-12-27 RX ADMIN — ONDANSETRON 4 MG: 2 INJECTION INTRAMUSCULAR; INTRAVENOUS at 08:47

## 2021-12-27 RX ADMIN — FENTANYL CITRATE 100 MCG: 50 INJECTION, SOLUTION INTRAMUSCULAR; INTRAVENOUS at 08:38

## 2021-12-27 RX ADMIN — Medication 0.2 MG: at 08:36

## 2021-12-27 RX ADMIN — LIDOCAINE HYDROCHLORIDE 50 MG: 10 INJECTION, SOLUTION EPIDURAL; INFILTRATION; INTRACAUDAL; PERINEURAL at 08:33

## 2021-12-27 RX ADMIN — SODIUM CHLORIDE, POTASSIUM CHLORIDE, SODIUM LACTATE AND CALCIUM CHLORIDE 1000 ML: 600; 310; 30; 20 INJECTION, SOLUTION INTRAVENOUS at 07:02

## 2021-12-27 RX ADMIN — PROPOFOL 300 MG: 10 INJECTION, EMULSION INTRAVENOUS at 08:33

## 2021-12-27 RX ADMIN — KETOROLAC TROMETHAMINE 30 MG: 30 INJECTION, SOLUTION INTRAMUSCULAR; INTRAVENOUS at 08:47

## 2021-12-27 ASSESSMENT — PULMONARY FUNCTION TESTS
PIF_VALUE: 0
PIF_VALUE: 10
PIF_VALUE: 6
PIF_VALUE: 10
PIF_VALUE: 10
PIF_VALUE: 18
PIF_VALUE: 1
PIF_VALUE: 11
PIF_VALUE: 10
PIF_VALUE: 1
PIF_VALUE: 0
PIF_VALUE: 22
PIF_VALUE: 11
PIF_VALUE: 15
PIF_VALUE: 8
PIF_VALUE: 10
PIF_VALUE: 11
PIF_VALUE: 10
PIF_VALUE: 5
PIF_VALUE: 10
PIF_VALUE: 1
PIF_VALUE: 7
PIF_VALUE: 10
PIF_VALUE: 5
PIF_VALUE: 3
PIF_VALUE: 10
PIF_VALUE: 6
PIF_VALUE: 10
PIF_VALUE: 5
PIF_VALUE: 10

## 2021-12-27 ASSESSMENT — PAIN SCALES - GENERAL
PAINLEVEL_OUTOF10: 0

## 2021-12-27 ASSESSMENT — ENCOUNTER SYMPTOMS: SHORTNESS OF BREATH: 1

## 2021-12-27 NOTE — OP NOTE
PODIATRY OP NOTE    PATIENT NAME: Matthew Salinas  YOB: 1965  -  61 y.o. female  MRN: [id number]  DATE: 12/27/2021  BILLING #: [id number]    Surgeon(s):  Hannah Barrett DPM     ASSISTANTS: Aydin Khan DPM PGY-2    PRE-OP DIAGNOSIS:   1. HAV deformity, right foot  2. Pain in right foot    POST-OP DIAGNOSIS: Same as above. PROCEDURE:   1. Silver bunionectomy, right foot    ANESTHESIA: MAC with local    HEMOSTASIS: Pneumatic ankle tourniquet, right ankle    ESTIMATED BLOOD LOSS: Less than 5cc. MATERIALS:   * No implants in log *    INJECTABLES:   Pre-op 10cc 1:1 mix of 0.5% marcaine plain and 1% lidocaine plain      SPECIMEN:   * No specimens in log *    COMPLICATIONS: none    FINDINGS: See op note below for detail    The patient was counseled at length about the risks of dash Covid-19 during their perioperative period and any recovery window from their procedure. The patient was made aware that dash Covid-19  may worsen their prognosis for recovering from their procedure  and lend to a higher morbidity and/or mortality risk. All material risks, benefits, and reasonable alternatives including postponing the procedure were discussed. The patient does wish to proceed with the procedure at this time. INDICATION FOR PROCEDURE: Patient has had a painful hallux abducto valgus deformity on the right foot and has failed conservative treatments and elected to undergo surgery. Risks and benefits were discussed. No guarantees were given or implied. Consent is signed and in the chart. PROCEDURE IN DETAIL: The patient was transported from pre-op to the operating room and placed on the operating table in the supine position with a safety strap across the lap. A pneumatic ankle tourniquet was applied to the right ankle. After adequate sedation by the Anesthesia, a Monterroso block of 10cc of 1:1 mixture of 1% lidocaine plain and 0.5% Marcaine plain was injected.  The foot was then prepped and draped in the usual aseptic fashion. The foot was then exsanguinated with an Esmarch bandage. The pneumatic calf tourniquet was inflated to 250 mmHg. Attention was directed to the 1st MTPJ where it was noted that there was a medial eminence remaining. A medial incision was created over the 1st MTPJ using a #15 blade. The incision was deepened with a combination of sharp and blunt dissection. A linear capsular incision was created and the medial aspect of the periosteum was reflected off the 1st metatarsal head. Using a sagittal saw the medial eminence was resected sharply and then a reciprocating rasp was then used to smooth the 1st metatarsal head. A medial capsulorraphy was then performed. The medial eminence was then noted to have been reduced with minimal redundant soft tissue present. The Hallux was noted to be rectus     The surgical sites were irrigated with normal saline. The surgical incisions were then closed sequentially in layers with 3-0 Monocryl, 4-0 PDS and 4-0 Prolene. Dressings consisted of adaptic, 4 x 4s, Kerlix and an ACE bandage. The pneumatic ankle tourniquet was released and immediate hyperemic flush was noted to all five digits of the right foot. A surgical shoe was then applied postoperatively. The patient tolerated the above procedure and anesthesia well without complications. The patient was transported from the operating room to the PACU with vital signs stable and vascular status intact to the right foot.      Electronically signed by Carlos Miranda DPM on 12/27/2021 at 9:21 AM    [facility] [place] [id number] [facility]   12/27/2021 at 9:20 AM

## 2021-12-27 NOTE — ANESTHESIA POSTPROCEDURE EVALUATION
POST- ANESTHESIA EVALUATION       Pt Name: Ivette Hernández  MRN: [id number]  YOB: 1965  Date of evaluation: 12/27/2021  Time:  9:44 AM      /85   Pulse 67   Temp 97 °F (36.1 °C) (Temporal)   Resp 12   Ht 5' 6\" (1.676 m)   Wt 143 lb 11.8 oz (65.2 kg)   SpO2 96%   BMI 23.20 kg/m²      Consciousness Level  Awake  Cardiopulmonary Status  Stable  Pain Adequately Treated YES  Nausea / Vomiting  NO  Adequate Hydration  YES  Anesthesia Related Complications NONE      Electronically signed by Alix Yarbrough MD on 12/27/2021 at 9:44 AM       Department of Anesthesiology  Postprocedure Note    Patient: Ivette Hernández  MRN: [id number]  YOB: 1965  Date of evaluation: 12/27/2021  Time:  9:44 AM     Procedure Summary     Date: 12/27/21 Room / Location: [address]    Anesthesia Start: 9467 Anesthesia Stop: 8981    Procedure: SILVER BUNIONECTOMY GREAT TOE (Right ) Diagnosis: (BUNION OF RIGHT HALLUX)    Surgeons: Hassel Simmonds, DPM Responsible Provider: Alix Yarbrough MD    Anesthesia Type: general ASA Status: 3          Anesthesia Type: general    Humaira Phase I: Humaira Score: 10    Humaira Phase II:      Last vitals: Reviewed and per EMR flowsheets.        Anesthesia Post Evaluation

## 2021-12-27 NOTE — INTERVAL H&P NOTE
Pt Name: Quinn Bateman  MRN: [id number]  YOB: 1965  Date of evaluation: 12/27/2021    I have reviewed the patient's history and physical examination completed in pre-admission testing.     Changes to history or on examination, if any, are as follows:  none    Jennifer Phillips PA-C  12/27/21  7:12 AM

## 2021-12-27 NOTE — ANESTHESIA PRE PROCEDURE
Department of Anesthesiology  Preprocedure Note       Name:  Denver Smith   Age:  61 y.o.  :  1965                                          MRN:  [id number]         Date:  2021      Surgeon: Ernestina Lazo):  Joe Garcia DPM    Procedure: Procedure(s):  SILVER BUNIONECTOMY GREAT TOE    Medications prior to admission:   Prior to Admission medications    Medication Sig Start Date End Date Taking? Authorizing Provider   TRINTELLIX 10 MG TABS tablet  21  Yes Historical Provider, MD   atorvastatin (LIPITOR) 20 MG tablet Take 1 tablet by mouth daily 21  Yes Abdiaziz Farris MD   omeprazole (PRILOSEC) 40 MG delayed release capsule Take 1 capsule by mouth daily 21 Yes Abdiaziz Farris MD   ibuprofen (ADVIL;MOTRIN) 800 MG tablet Take 1 tablet by mouth 2 times daily as needed for Pain 21  Yes Abdiaziz Farris MD   senna-docusate (RA P COL-RITE) 8.6-50 MG per tablet take 2 tablets by mouth once daily if needed for constipation 21  Yes Abdiaziz Farris MD   tazarotene (AVAGE) 0.1 % cream Apply topically nightly.  21  Yes Maxine Hunter MD   ARNUITY ELLIPTA 200 MCG/ACT AEPB inhale 1 puff by mouth and INTO THE LUNGS once daily 21  Yes Sheri Bonilla MD   INCRUSE ELLIPTA 62.5 MCG/INH AEPB inhale 1 puff by mouth and INTO THE LUNGS once daily 21  Yes Sheri Bonilla MD   famotidine (PEPCID) 20 MG tablet Take 1 tablet by mouth 2 times daily 21  Yes Kirill Dennison MD   tiZANidine (ZANAFLEX) 4 MG tablet take 1 tablet by mouth three times a day 21  Yes Bree Leon MD   Cholecalciferol (VITAMIN D3) 5000 units TABS Take 1 tablet by mouth daily   Yes Historical Provider, MD   zoster recombinant adjuvanted vaccine [facility]) 50 MCG/0.5ML SUSR injection Inject 0.5 mLs into the muscle See Admin Instructions 1 dose now and repeat in 2-6 months 21  Abdiaziz Farris MD   diphenhydrAMINE (BENADRYL) 25 MG capsule Take 2 capsules by mouth every 6 hours as needed for esophagitis K21.00    Chronic bilateral low back pain without sciatica M54.50, G89.29    Gingivitis K05.10    Polyuria R35.89    Urinary frequency R35.0       Past Medical History:        Diagnosis Date    Acne     Dr. Prasanna Gomez Dermatology. annual visits    Vargas's esophagus     for 20 yrs on and off. [address]    Bipolar disorder [facility])     recently diagnosed and placed on meds. No longer an issue since sobriety    Bunion of great toe of left foot     Bunion of great toe of right foot     Dr. Adams Kaiser [place]    Chronic obstructive pulmonary disease (COPD) ([facility] [address]     Dr. Kiesha Barcenas Pulmonology. last visit 2021    Depression     On Meds. Dr. Trenton Spann on [address] Dyspnea on exertion     Eating disorder     bulemia    Empyema ([facility] [address] 2014    left. ICU hospitalization 2014 for 3 weeks    Fatigue due to depression     GERD (gastroesophageal reflux disease)     H/O chronic respiratory failure     Moderate persistent asthma     Pneumonia 2014    3 week hospitalization    Seasonal allergies     Smoking     Substance abuse ([facility]     quit 2014    Wellness examination 2021    [facility]. next appointment 2021       Past Surgical History:        Procedure Laterality Date    COLONOSCOPY      ENDOSCOPY, COLON, DIAGNOSTIC      HYSTERECTOMY      THORACOSCOPY  14    Lt.  VATS w/complete decortication    UPPER GASTROINTESTINAL ENDOSCOPY  10/13/2014    GE junction biopsy    UPPER GASTROINTESTINAL ENDOSCOPY N/A 11/10/2020    EGD BIOPSY performed by Williams Murdock MD at [place] [address] History:    Social History     Tobacco Use    Smoking status: Former Smoker     Packs/day: 1.00     Years: 34.00     Pack years: 34.00     Types: Cigarettes     Start date: 1977     Quit date: 2014     Years since quittin.8    Smokeless tobacco: Never Used   Substance Use Topics    Alcohol use: Not Currently     Comment: quit 2214 Counseling given: Not Answered      Vital Signs (Current):   Vitals:    12/27/21 0645   BP: 105/77   Pulse: 71   Resp: 20   Temp: 98.1 °F (36.7 °C)   TempSrc: Temporal   SpO2: 97%   Weight: 143 lb 11.8 oz (65.2 kg)   Height: 5' 6\" (1.676 m)                                              BP Readings from Last 3 Encounters:   12/27/21 105/77   12/13/21 (!) 93/50   12/13/21 113/78       NPO Status: Time of last liquid consumption: 1830                        Time of last solid consumption: 1830                        Date of last liquid consumption: 12/26/21                        Date of last solid food consumption: 12/26/21    BMI:   Wt Readings from Last 3 Encounters:   12/27/21 143 lb 11.8 oz (65.2 kg)   12/13/21 143 lb (64.9 kg)   12/13/21 143 lb (64.9 kg)     Body mass index is 23.2 kg/m². CBC:   Lab Results   Component Value Date    WBC 6.0 10/21/2020    RBC 4.21 10/21/2020    HGB 13.0 10/21/2020    HCT 39.4 10/21/2020    MCV 93.7 10/21/2020    RDW 12.5 10/21/2020     10/21/2020       CMP:   Lab Results   Component Value Date     05/12/2021    K 4.0 05/12/2021     05/12/2021    CO2 24 05/12/2021    BUN 12 12/13/2021    CREATININE 0.63 12/13/2021    GFRAA >60 12/13/2021    LABGLOM >60 12/13/2021    GLUCOSE 77 12/13/2021    PROT 6.5 02/11/2021    CALCIUM 9.6 05/12/2021    BILITOT 0.28 02/11/2021    ALKPHOS 59 02/11/2021    AST 20 02/11/2021    ALT 17 02/11/2021       POC Tests: No results for input(s): POCGLU, POCNA, POCK, POCCL, POCBUN, POCHEMO, POCHCT in the last 72 hours.     Coags:   Lab Results   Component Value Date    PROTIME 12.6 02/21/2014    INR 1.2 02/21/2014    APTT 23.9 02/16/2014       HCG (If Applicable): No results found for: PREGTESTUR, PREGSERUM, HCG, HCGQUANT     ABGs: No results found for: PHART, PO2ART, ZJS2VBF, ERY0KHH, BEART, T1JIJFAS     Type & Screen (If Applicable):  No results found for: LABABO, LABRH    Drug/Infectious Status (If Applicable):  Lab

## 2022-01-05 LAB
EKG ATRIAL RATE: 66 BPM
EKG P AXIS: 39 DEGREES
EKG P-R INTERVAL: 174 MS
EKG Q-T INTERVAL: 390 MS
EKG QRS DURATION: 74 MS
EKG QTC CALCULATION (BAZETT): 408 MS
EKG R AXIS: -19 DEGREES
EKG T AXIS: 22 DEGREES
EKG VENTRICULAR RATE: 66 BPM

## 2022-01-05 NOTE — PROGRESS NOTES
Patient instructed to remove shoes and socks and instructed to sit in exam chair. Current PCP is Robin Chopra MD and date of last visit was 12/13/21. Do you have a follow up visit scheduled? No  If yes, the date is     PATIENT WAS INSTRUCTED ON HOW TO INSERT AND WEAR ORTHOTICS. PT IS TO FOLLOW UP IN 1 MONTH WITH DPM FOR ORTHOTIC CHECK.

## 2022-01-10 ENCOUNTER — OFFICE VISIT (OUTPATIENT)
Dept: PODIATRY | Age: 57
End: 2022-01-10

## 2022-01-10 VITALS
WEIGHT: 143 LBS | HEART RATE: 75 BPM | SYSTOLIC BLOOD PRESSURE: 112 MMHG | BODY MASS INDEX: 22.98 KG/M2 | HEIGHT: 66 IN | DIASTOLIC BLOOD PRESSURE: 81 MMHG

## 2022-01-10 DIAGNOSIS — Z98.890 POST-OPERATIVE STATE: Primary | ICD-10-CM

## 2022-01-10 DIAGNOSIS — M20.11 HALLUX VALGUS OF RIGHT FOOT: ICD-10-CM

## 2022-01-10 PROCEDURE — 99024 POSTOP FOLLOW-UP VISIT: CPT

## 2022-01-10 RX ORDER — CEPHALEXIN 500 MG/1
500 CAPSULE ORAL 3 TIMES DAILY
Qty: 21 CAPSULE | Refills: 0 | Status: SHIPPED | OUTPATIENT
Start: 2022-01-10 | End: 2022-01-17 | Stop reason: ALTCHOICE

## 2022-01-10 NOTE — PROGRESS NOTES
[address], [facility] Slava Dumont  Tel: [phone]  Fax: [phone]    Subjective     CC: post op right silver bunionectomy    HPI:  Esperanza Patel is a 61y.o. year old female who presents to clinic today for postoperative visit from 12/27/2021 right silver bunionectomy. Pt states she missed her appointment from last week. Pt states she has been showering. She states she has transitioned from surgical shoe to her boots. She denies any nausea, vomiting, fever, chills or SOB. Primary care physician is Christal Ferro MD.    ROS:    Constitutional: Denies nausea, vomiting, fever, chills. Neurologic: Denies numbness, tingling, and burning in the feet. Vascular: Denies symptoms of lower extremity claudication. Skin: Denies open wounds. Otherwise negative except as noted in the HPI. PMH:  Past Medical History:   Diagnosis Date    Acne     Dr. Morris [facility]. annual visits    Vargas's esophagus     for 20 yrs on and off. [address]    Bipolar disorder [facility])     recently diagnosed and placed on meds. No longer an issue since sobriety    Bunion of great toe of left foot     Bunion of great toe of right foot     Dr. Thi Singh [facility]    Chronic obstructive pulmonary disease (COPD) ([facility] [address]     Dr. [facility]. last visit 7/2021    Depression     On Meds. Dr. Livan Medellin on [address] Dyspnea on exertion     Eating disorder     bulemia    Empyema ([facility] [address] 02/17/2014    left.  ICU hospitalization 2/2014 for 3 weeks    Fatigue due to depression     GERD (gastroesophageal reflux disease)     H/O chronic respiratory failure     Moderate persistent asthma     Pneumonia 02/2014    3 week hospitalization    Seasonal allergies     Smoking     Substance abuse ([facility] [address]     quit 02/2014    [facility]/20[facility] next appointment 12/13/2021       Surgical History:   Past Surgical History:   Procedure Laterality Date    BUNIONECTOMY Right 2021    SILVER BUNIONECTOMY GREAT TOE (Right )    BUNIONECTOMY Right 2021    SILVER BUNIONECTOMY GREAT TOE performed by Sedrick Harris DPM at [facility]    THORACOSCOPY  2014    Lt. VATS w/complete decortication    UPPER GASTROINTESTINAL ENDOSCOPY  10/13/2014    GE junction biopsy    UPPER GASTROINTESTINAL ENDOSCOPY N/A 11/10/2020    EGD BIOPSY performed [facility] Easton Shaw [facility] at [facility] History:  Social History     Tobacco Use    Smoking status: Former Smoker     Packs/day: 1.00     Years: 34.00     Pack years: 34.00     Types: Cigarettes     Start date: 1977     Quit date: 2014     Years since quittin.8    Smokeless tobacco: Never Used   Vaping Use    Vaping Use: Never used   Substance Use Topics    Alcohol use: Not Currently     Comment: quit 2214    Drug use: No     Types: Cocaine     Comment: past hx of cocaine, marijuana, denies any IVDA       Medications:  Prior to Admission medications    Medication Sig Start Date End Date Taking?  Authorizing Provider   cephALEXin (KEFLEX) 500 MG capsule Take 1 capsule by mouth 3 times daily for 7 days 1/10/22 1/17/22 Yes Sabine Gerber DPM   ondansetron (ZOFRAN) 4 MG tablet Take 1 tablet by mouth every 8 hours as needed for Nausea 21  Yes Joan Beltran DPM   TRINTELLIX 10 MG TABS tablet  21  Yes Historical Provider, MD   atorvastatin (LIPITOR) 20 MG tablet Take 1 tablet by mouth daily 21  Yes Tiffanie Felder MD   omeprazole (PRILOSEC) 40 MG delayed release capsule Take 1 capsule by mouth daily 21 Yes Tiffanie Felder MD   ibuprofen (ADVIL;MOTRIN) 800 MG tablet Take 1 tablet by mouth 2 times daily as needed for Pain 21  Yes Tiffanie Felder MD   senna-docusate (RA P COL-RITE) 8.6-50 MG per tablet take 2 tablets by mouth once daily if needed for constipation 21 Yes Angelica Coronado MD   zoster recombinant adjuvanted vaccine [facility]) 50 MCG/0.5ML SUSR injection Inject 0.5 mLs into the muscle See Admin Instructions 1 dose now and repeat in 2-6 months 12/13/21 6/11/22 Yes Angelica Coronado MD   tazarotene (AVAGE) 0.1 % cream Apply topically nightly. 12/8/21  Yes Mal Goltz, MD   ARNUITY ELLIPTA 200 MCG/ACT AEPB inhale 1 puff by mouth and INTO THE LUNGS once daily 7/26/21  Yes Hussein Garcia MD   INCRUSE ELLIPTA 62.5 MCG/INH AEPB inhale 1 puff by mouth and INTO THE LUNGS once daily 7/26/21  Yes Hussein Garcia MD   diphenhydrAMINE (BENADRYL) 25 MG capsule Take 2 capsules by mouth every 6 hours as needed for Itching 6/20/21  Yes Brianna Banda MD   famotidine (PEPCID) 20 MG tablet Take 1 tablet by mouth 2 times daily 6/20/21  Yes Brianna Banda MD   tiZANidine (ZANAFLEX) 4 MG tablet take 1 tablet by mouth three times a day 5/11/21  Yes Breanna Gavin MD   RA ALLERGY RELIEF 10 MG tablet take 1 tablet by mouth once daily 12/15/18  Yes Christopher Gee MD   EPINEPHrine (EPIPEN 2-CRUZ) 0.3 MG/0.3ML SOAJ injection Use as directed for allergic reaction 12/21/17  Yes Lincoln Watts MD   albuterol sulfate HFA (VENTOLIN HFA) 108 (90 Base) MCG/ACT inhaler Inhale 2 puffs into the lungs every 6 hours as needed for Wheezing 10/11/17  Yes Hussein Garcia MD   Cholecalciferol (VITAMIN D3) 5000 units TABS Take 1 tablet by mouth daily   Yes Historical Provider, MD   ferrous sulfate 325 (65 FE) MG tablet take 1 tablet by mouth once daily WITH BREAKFAST 5/22/17  Yes Lincoln Watts MD   TRINTELLIX 20 MG TABS tablet 30 mg  9/19/16  Yes Historical Provider, MD   aspirin 325 MG EC tablet Take 1 tablet by mouth daily 8/21/19 6/20/21  Margareth Cabrera MD       Objective     Vitals:    01/10/22 1243   BP: 112/81   Pulse: 75       Lab Results   Component Value Date    LABA1C 5.0 05/11/2021       Physical Exam:  General:  Alert and oriented x3. In no acute distress.      Lower Extremity Physical Exam:    Vascular: DP and PT pulses are intact and palpable, Bilateral. CFT <3 seconds to all digits, Bilateral.  Mild edema appreciated to the right great toe at the incision site. Hair growth is absent to the level of the digits, Bilateral.     Neuro: Saph/sural/SP/DP/plantar sensation intact to light touch. Protective sensation is intact to 10/10 sites as tested with a 5.07g SWMF, Bilateral.     Musculoskeletal: EHL/FHL/GS/TA gross motor intact. Mild tenderness to palpation to the incision site of the right great toe. Dermatologic: routine healing appreciated with routine healing. Erythema and swelling appreciated to the right incision site of the right foot. Interdigital maceration absent, Bilateral.  Nails 1-10 are well trimmed. Imaging:             Assessment   Ceferino Roldan is a 61 y.o. female with     Diagnosis Orders   1. Post-operative state     2. Hallux valgus of right foot          Plan   · Patient examined and evaluated  · Diagnosis and treatment options discussed in detail  · Patient instructed to monitor for signs and symptoms of infection, including but not limited to increased erythema, increased edema, increased pain, purulent drainage, f/n/v/c/SOB/chest pain, and to call the clinic for an urgent appointment or to report to the ED should they experience these symptoms. The patient verbalized understanding  · The patient was educated on clinical findings, diagnosis and treatment plans. Patient states that she understands all that has been explained and all questions were answered to her apparent satisfaction. · The patient presented to our clinic today for possible suture removal.   · We discussed the presence of erythema and swelling. We discussed the use antibiotics to control the edema and redness. Pt also understand to wait another week before removing sutures. Pt understands and is aggreable to plan .    · There is low risk of morbidity from additional diagnostic testing or treatment. At this time we recommend moving forward with wearing surgical shoe to the right foot, keeping sutures dry and intact and to use a showerbag. Pt advised to use antibiotics as prescribed. · Dispensed new surgical shoe that is to be used to the right. Pt advised to avoid using tight shoe gear to the right foot at this time  · Patient to RTC in 1 week  · Please, call the office with any questions or concerns     Orders Placed This Encounter   Medications    cephALEXin (KEFLEX) 500 MG capsule     Sig: Take 1 capsule by mouth 3 times daily for 7 days     Dispense:  21 capsule     Refill:  0     No orders of the defined types were placed in this encounter.     Maxine Arceo DPM   Podiatric Medicine & Surgery   1/10/2022 at 2:51 PM

## 2022-01-10 NOTE — PROGRESS NOTES
Patient instructed to remove shoes and socks and instructed to sit in exam chair. Current PCP is Prince Mildred MD and date of last visit was 12/13/21. Do you have a follow up visit scheduled?   No  If yes, the date is

## 2022-01-17 ENCOUNTER — OFFICE VISIT (OUTPATIENT)
Dept: PODIATRY | Age: 57
End: 2022-01-17

## 2022-01-17 VITALS
DIASTOLIC BLOOD PRESSURE: 75 MMHG | HEART RATE: 67 BPM | SYSTOLIC BLOOD PRESSURE: 115 MMHG | HEIGHT: 66 IN | WEIGHT: 144 LBS | BODY MASS INDEX: 23.14 KG/M2

## 2022-01-17 DIAGNOSIS — Z98.890 POST-OPERATIVE STATE: Primary | ICD-10-CM

## 2022-01-17 DIAGNOSIS — M20.11 HALLUX VALGUS OF RIGHT FOOT: ICD-10-CM

## 2022-01-17 DIAGNOSIS — Z91.199 NONCOMPLIANCE: ICD-10-CM

## 2022-01-17 PROCEDURE — 99024 POSTOP FOLLOW-UP VISIT: CPT | Performed by: PODIATRIST

## 2022-01-17 RX ORDER — CEPHALEXIN 500 MG/1
500 CAPSULE ORAL 3 TIMES DAILY
Qty: 21 CAPSULE | Refills: 0 | Status: SHIPPED | OUTPATIENT
Start: 2022-01-17 | End: 2022-01-24

## 2022-01-17 NOTE — PROGRESS NOTES
[address]  [address] [place] [address] [address] , [address]  Tel: [phone]  Fax: [phone]    Subjective     CC: post op right silver bunionectomy    Interval history:  Patient returns to clinic for post op visit. She has been non-compliant with keeping dressing intact and has been getting her foot wet. Has been wearing surgical shoe. Last time, Keflex was prescribed due to increased erythema to the surgical site. She denies no pain. HPI:  Ceferino Roldan is a 61y.o. year old female who presents to clinic today for postoperative visit from 12/27/2021 right silver bunionectomy. Pt states she missed her appointment from last week. Pt states she has been showering. She states she has transitioned from surgical shoe to her boots. She denies any nausea, vomiting, fever, chills or SOB. Primary care physician is Daksha Zee MD.    ROS:    Constitutional: Denies nausea, vomiting, fever, chills. Neurologic: Denies numbness, tingling, and burning in the feet. Vascular: Denies symptoms of lower extremity claudication. Skin: Denies open wounds. Otherwise negative except as noted in the HPI. PMH:  Past Medical History:   Diagnosis Date    Acne     Dr. Monica Vogel Dermatology. annual visits    Vargas's esophagus     for 20 yrs on and off. Radha [address]    Bipolar disorder [facility])     recently diagnosed and placed on meds. No longer an issue since sobriety    Bunion of great toe of left foot     Bunion of great toe of right foot     Dr. Davis Severe CPM    Chronic obstructive pulmonary disease (COPD) ([facility] [address]     Dr. Susan Torres Pulmonology. last visit 7/2021    Depression     On Meds. Dr. Lex Doshi on [address] Dyspnea on exertion     Eating disorder     bulemia    Empyema ([facility] [address] 02/17/2014    left.  ICU hospitalization 2/2014 for 3 weeks    Fatigue due to depression     GERD (gastroesophageal reflux disease)     H/O chronic respiratory failure     Moderate persistent asthma     Pneumonia 2014    3 week hospitalization    Seasonal allergies     Smoking     Substance abuse ([facility] [address]     quit 2014    Wellness examination 2021    [facility]. next appointment 2021       Surgical History:   Past Surgical History:   Procedure Laterality Date    BUNIONECTOMY Right 2021    SILVER BUNIONECTOMY GREAT TOE (Right )    BUNIONECTOMY Right 2021    SILVER BUNIONECTOMY GREAT TOE performed by Yanely Aragon DPM at [facility] [address]    THORACOSCOPY  2014    Lt. VATS w/complete decortication    UPPER GASTROINTESTINAL ENDOSCOPY  10/13/2014    GE junction biopsy    UPPER GASTROINTESTINAL ENDOSCOPY N/A 11/10/2020    EGD BIOPSY performed by Audra Estrada MD at [place] [address] History:  Social History     Tobacco Use    Smoking status: Former Smoker     Packs/day: 1.00     Years: 34.00     Pack years: 34.00     Types: Cigarettes     Start date: 1977     Quit date: 2014     Years since quittin.8    Smokeless tobacco: Never Used   Vaping Use    Vaping Use: Never used   Substance Use Topics    Alcohol use: Not Currently     Comment: quit 2214    Drug use: No     Types: Cocaine     Comment: past hx of cocaine, marijuana, denies any IVDA       Medications:  Prior to Admission medications    Medication Sig Start Date End Date Taking?  Authorizing Provider   cephALEXin (KEFLEX) 500 MG capsule Take 1 capsule by mouth 3 times daily for 7 days 22 Yes Beatrice Blankenship DPM   ondansetron (ZOFRAN) 4 MG tablet Take 1 tablet by mouth every 8 hours as needed for Nausea 21  Yes Parker Cole DPM   atorvastatin (LIPITOR) 20 MG tablet Take 1 tablet by mouth daily 21  Yes Page Darnell MD   omeprazole (PRILOSEC) 40 MG delayed release capsule Take 1 capsule by mouth daily 21 Yes Page Darnell MD   ibuprofen (ADVIL;MOTRIN) 800 MG tablet Take 1 tablet by mouth 2 times daily as needed for Pain 12/13/21  Yes Annetta Levy MD   senna-docusate (RA P COL-RITE) 8.6-50 MG per tablet take 2 tablets by mouth once daily if needed for constipation 12/13/21  Yes Annetta Levy MD   tazarotene (AVAGE) 0.1 % cream Apply topically nightly. 12/8/21  Yes Shay Grigsby MD   ARNUITY ELLIPTA 200 MCG/ACT AEPB inhale 1 puff by mouth and INTO THE LUNGS once daily 7/26/21  Yes Cherelle Partida MD   INCRUSE ELLIPTA 62.5 MCG/INH AEPB inhale 1 puff by mouth and INTO THE LUNGS once daily 7/26/21  Yes Cherelle Partida MD   diphenhydrAMINE (BENADRYL) 25 MG capsule Take 2 capsules by mouth every 6 hours as needed for Itching 6/20/21  Yes Linda Cruz MD   famotidine (PEPCID) 20 MG tablet Take 1 tablet by mouth 2 times daily 6/20/21  Yes Linda Cruz MD   tiZANidine (ZANAFLEX) 4 MG tablet take 1 tablet by mouth three times a day 5/11/21  Yes Prabhu Jansen MD   RA ALLERGY RELIEF 10 MG tablet take 1 tablet by mouth once daily 12/15/18  Yes Regis Fonseca MD   EPINEPHrine (EPIPEN 2-CRUZ) 0.3 MG/0.3ML SOAJ injection Use as directed for allergic reaction 12/21/17  Yes Fabiano Soliz MD   albuterol sulfate HFA (VENTOLIN HFA) 108 (90 Base) MCG/ACT inhaler Inhale 2 puffs into the lungs every 6 hours as needed for Wheezing 10/11/17  Yes Cherelle Partida MD   Cholecalciferol (VITAMIN D3) 5000 units TABS Take 1 tablet by mouth daily   Yes Historical Provider, MD   ferrous sulfate 325 (65 FE) MG tablet take 1 tablet by mouth once daily WITH BREAKFAST 5/22/17  Yes Fabiano Soliz MD   TRINTELLIX 20 MG TABS tablet 30 mg  9/19/16  Yes Historical Provider, MD   aspirin 325 MG EC tablet Take 1 tablet by mouth daily 8/21/19 6/20/21  Jerilyn Zhang MD       Objective     Vitals:    01/17/22 1301   BP: 115/75   Pulse: 67       Lab Results   Component Value Date    LABA1C 5.0 05/11/2021       Physical Exam:  General:  Alert and oriented x3. In no acute distress.      Lower Extremity Physical Exam:    Vascular: DP and PT pulses are intact and palpable, Bilateral. CFT <3 seconds to all digits, Bilateral.  Mild edema appreciated to the right great toe at the incision site. Hair growth is absent to the level of the digits, Bilateral.     Neuro: Saph/sural/SP/DP/plantar sensation intact to light touch. Protective sensation is intact to 10/10 sites as tested with a 5.07g SWMF, Bilateral.     Musculoskeletal: EHL/FHL/GS/TA gross motor intact. Mild tenderness to palpation to the incision site of the right great toe. Dermatologic: surgical site is well co-apted. Mild erythema and swelling appreciated to the right incision site of the right foot, improved from last visit  Interdigital maceration absent, Bilateral.  Nails 1-10 are well trimmed. Imaging:                 Assessment   Anay Black is a 61 y.o. female with     Diagnosis Orders   1. Post-operative state     2. Hallux valgus of right foot     3. Noncompliance          Plan   · Patient examined and evaluated  · Diagnosis and treatment options discussed in detail  · Patient instructed to monitor for signs and symptoms of infection, including but not limited to increased erythema, increased edema, increased pain, purulent drainage, f/n/v/c/SOB/chest pain, and to call the clinic for an urgent appointment or to report to the ED should they experience these symptoms. The patient verbalized understanding  · The patient was educated on clinical findings, diagnosis and treatment plans. Patient states that she understands all that has been explained and all questions were answered to her apparent satisfaction. · Suture removed without complication  · Betadine and steri-strip was applied to the surgical site  · Patient is educated to change dressing daily. · Rx Keflex.    · Patient to RTC in 1 week  · Please, call the office with any questions or concerns     Orders Placed This Encounter   Medications    cephALEXin (KEFLEX) 500 MG capsule Sig: Take 1 capsule by mouth 3 times daily for 7 days     Dispense:  21 capsule     Refill:  0     No orders of the defined types were placed in this encounter.     Cornelio Mosher DPM   Podiatric Medicine & Surgery   1/17/2022 at 2:57 PM

## 2022-01-24 NOTE — PROGRESS NOTES
Patient instructed to remove shoes and socks and instructed to sit in exam chair. Current PCP is Farnaz Schwartz MD and date of last visit was 12/13/21. Do you have a follow up visit scheduled?   No  If yes, the date is

## 2022-01-31 ENCOUNTER — HOSPITAL ENCOUNTER (OUTPATIENT)
Dept: GENERAL RADIOLOGY | Age: 57
Discharge: HOME | End: 2022-02-02
Payer: MEDICARE

## 2022-01-31 ENCOUNTER — OFFICE VISIT (OUTPATIENT)
Dept: PODIATRY | Age: 57
End: 2022-01-31

## 2022-01-31 ENCOUNTER — HOSPITAL ENCOUNTER (OUTPATIENT)
Age: 57
Discharge: HOME | End: 2022-02-02
Payer: MEDICARE

## 2022-01-31 VITALS
WEIGHT: 145 LBS | HEIGHT: 66 IN | BODY MASS INDEX: 23.3 KG/M2 | HEART RATE: 85 BPM | SYSTOLIC BLOOD PRESSURE: 114 MMHG | DIASTOLIC BLOOD PRESSURE: 75 MMHG

## 2022-01-31 DIAGNOSIS — Z98.890 STATUS POST RIGHT FOOT SURGERY: ICD-10-CM

## 2022-01-31 DIAGNOSIS — M20.11 HALLUX VALGUS OF RIGHT FOOT: ICD-10-CM

## 2022-01-31 DIAGNOSIS — Z98.890 STATUS POST RIGHT FOOT SURGERY: Primary | ICD-10-CM

## 2022-01-31 PROCEDURE — 73630 X-RAY EXAM OF FOOT: CPT

## 2022-01-31 PROCEDURE — 99024 POSTOP FOLLOW-UP VISIT: CPT | Performed by: PODIATRIST

## 2022-02-14 RX ORDER — FLUTICASONE FUROATE 200 UG/1
POWDER RESPIRATORY (INHALATION)
Qty: 1 EACH | Refills: 4 | Status: SHIPPED | OUTPATIENT
Start: 2022-02-14 | End: 2022-06-14 | Stop reason: ALTCHOICE

## 2022-02-14 RX ORDER — UMECLIDINIUM 62.5 UG/1
AEROSOL, POWDER ORAL
Qty: 1 EACH | Refills: 4 | Status: SHIPPED | OUTPATIENT
Start: 2022-02-14 | End: 2022-07-08

## 2022-02-14 NOTE — TELEPHONE ENCOUNTER
Dr Allegra Davies, patient is current and is scheduled for follow up on 4/19/22. Per last dictation patient is to continue these medications. Please sign for refill if ok. Thank you.

## 2022-03-09 ENCOUNTER — NURSE TRIAGE (OUTPATIENT)
Dept: OTHER | Facility: CLINIC | Age: 57
End: 2022-03-09

## 2022-03-09 NOTE — TELEPHONE ENCOUNTER
Received call from Brian Dean at [facility] with [employer]. Subjective: Caller states \"Fatigue and nausea. \"     Current Symptoms: fatigue, headache, nasal congestion, muscle aches, bloody nose on left side. Onset: 6 days ago; sudden    Associated Symptoms: increased sleepiness    Pain Severity: 0/10; N/A; none    Temperature: Patient denies by unknown method states she had a fever a few days ago    What has been tried: inhaler    LMP: NA Pregnant: NA    Recommended disposition: See in Office Today or Tomorrow    Care advice provided, patient verbalizes understanding; denies any other questions or concerns; instructed to call back for any new or worsening symptoms. Patient/Caller agrees with recommended disposition; writer provided warm transfer to [employer] at [facility] for appointment scheduling     Attention Provider: Thank you for allowing me to participate in the care of your patient. The patient was connected to triage in response to information provided to the ECC/PSC. Please do not respond through this encounter as the response is not directed to a shared pool. Reason for Disposition   Patient wants to be seen    Answer Assessment - Initial Assessment Questions  1. WORST SYMPTOM: \"What is your worst symptom? \" (e.g., cough, runny nose, muscle aches, headache, sore throat, fever)       Fatigue and nausea    2. ONSET: \"When did your flu symptoms start? \"       Thursday last week    3. COUGH: \"How bad is the cough? \"        No cough    4. RESPIRATORY DISTRESS: \"Describe your breathing. \"       It is WNL    5. FEVER: \"Do you have a fever? \" If Yes, ask: \"What is your temperature, how was it measured, and when did it start? \"      A few days ago    6. EXPOSURE: \"Were you exposed to someone with influenza? \"        I was exposed to someone who was sick last week    7. FLU VACCINE: \"Did you get a flu shot this year? \"      Yes    8. HIGH RISK DISEASE: \"Do you any chronic medical problems? \" (e.g., heart or lung disease, asthma, weak immune system, or other HIGH RISK conditions)      COPD,    9. PREGNANCY: \"Is there any chance you are pregnant? \" \"When was your last menstrual period? \"      N/a    10. OTHER SYMPTOMS: \"Do you have any other symptoms? \"  (e.g., runny nose, muscle aches, headache, sore throat)        Fatigue, headache, nasal congestion, muscle aches, bloody nose on left side.     Protocols used: INFLUENZA - SEASONAL-ADULT-OH

## 2022-03-10 ENCOUNTER — TELEMEDICINE (OUTPATIENT)
Dept: FAMILY MEDICINE CLINIC | Age: 57
End: 2022-03-10
Payer: MEDICAID

## 2022-03-10 DIAGNOSIS — J06.9 VIRAL URI: Primary | ICD-10-CM

## 2022-03-10 PROCEDURE — 99213 OFFICE O/P EST LOW 20 MIN: CPT | Performed by: STUDENT IN AN ORGANIZED HEALTH CARE EDUCATION/TRAINING PROGRAM

## 2022-03-10 RX ORDER — FLUTICASONE PROPIONATE 50 MCG
1 SPRAY, SUSPENSION (ML) NASAL DAILY
Qty: 32 G | Refills: 1 | Status: SHIPPED | OUTPATIENT
Start: 2022-03-10

## 2022-03-10 RX ORDER — GUAIFENESIN/DEXTROMETHORPHAN 100-10MG/5
5 SYRUP ORAL 3 TIMES DAILY PRN
Qty: 120 ML | Refills: 1 | Status: SHIPPED | OUTPATIENT
Start: 2022-03-10 | End: 2022-03-20

## 2022-03-10 NOTE — PROGRESS NOTES
Mauro Koroma is a 61 y.o. female evaluated via telephone on 3/10/2022. Consent:  She and/or health care decision maker is aware that that she may receive a bill for this telephone service, which includes applicable co-pays, depending on her insurance coverage, and has provided verbal consent to proceed. Documentation:  I communicated with the patient and/or health care decision maker about  Details of this discussion including any medical advice provided:    Patient is a 70-year-old female seen today via virtual visit. Patient states since last Monday she has had symptoms of a viral URI which seem to get worse with time. Took a Covid test on the weekend which was negative, states she is still having a headache, nonproductive cough, chills, no documented fever. Patient that she has not taken anything for the illness as of yet. We will send patient Flonase and cough syrup. I affirm this is a Patient Initiated Episode with a Patient who has not had a related appointment within my department in the past 7 days or scheduled within the next 24 hours. Patient identification was verified at the start of the visit: Yes    Total Time: minutes: 11-20 minutes    Mauro Koroma was evaluated through a synchronous (real-time) audio encounter. The patient was located at home in a state where the provider was licensed to provide care.     Note: not billable if this call serves to triage the patient into an appointment for the relevant concern      Mani Bolanos MD

## 2022-03-10 NOTE — PROGRESS NOTES
Visit Information    Have you changed or started any medications since your last visit including any over-the-counter medicines, vitamins, or herbal medicines? no   Are you having any side effects from any of your medications? -  no  Have you stopped taking any of your medications? Is so, why? -  no    Have you seen any other physician or provider since your last visit? No  Have you had any other diagnostic tests since your last visit? No  Have you been seen in the emergency room and/or had an admission to a hospital since we last saw you? No  Have you had your routine dental cleaning in the past 6 months? no    Have you activated your [employer] account? If not, what are your barriers?  Yes     Patient Care Team:  José Miguel Lucia MD as PCP - General (Emergency Medicine)  Fernando Mckeon MD as PCP - [facility]  Ken Cheung MD as Surgeon (Cardiothoracic Surgery)  Rigoberto Jordan MD as Consulting Physician (Pulmonology)  Catherine Pulliam RN as Nurse Navigator    Medical History Review  Past Medical, Family, and Social History reviewed and does not contribute to the patient presenting condition    Health Maintenance   Topic Date Due    Breast cancer screen  07/25/2019    Shingles Vaccine (2 of 2) 02/08/2022    Lipid screen  02/11/2022    Low dose CT lung screening  10/18/2022    Depression Screen  12/13/2022    DTaP/Tdap/Td vaccine (2 - Td or Tdap) 04/30/2024    Colorectal Cancer Screen  07/14/2026    Pneumococcal 0-64 years Vaccine (2 of 2 - PPSV23) 07/17/2029    Flu vaccine  Completed    COVID-19 Vaccine  Completed    Hepatitis C screen  Completed    HIV screen  Completed    Hepatitis A vaccine  Aged Out    Hepatitis B vaccine  Aged Out    Hib vaccine  Aged Out    Meningococcal (ACWY) vaccine  Aged Out

## 2022-03-10 NOTE — PATIENT INSTRUCTIONS
Thank you for letting us take care of you today. We hope all your questions were addressed. If a question was overlooked or something else comes to mind after you return home, please contact a member of your Care Team listed below. Your Care Team at [place] [address] is Team #5  Raf Degroot MD (Faculty)  Kaylee Cuello MD (Resident)  Raffaele Nevarez MD (Resident)  Shelia Bolaños MD (Resident)  Tati Sam MD (Resident)  Venkata Drake, DERIK Eng., JEANIE Coughlin., Ricarda Salgado., [facility] office)  Caleb Pandey, [address] (Clinical Practice Manager)  CATARINO Fitch [facility] (Clinical Pharmacist)       Office phone number: [phone]    If you need to get in right away due to illness, please be advised we have \"Same Day\" appointments available Monday-Friday. Please call us at [phone] option #3 to schedule your \"Same Day\" appointment.

## 2022-03-11 NOTE — PROGRESS NOTES
Attending Physician Statement    Wt Readings from Last 3 Encounters:   01/31/22 145 lb (65.8 kg)   01/17/22 144 lb (65.3 kg)   01/10/22 143 lb (64.9 kg)     Temp Readings from Last 3 Encounters:   12/27/21 97.9 °F (36.6 °C) (Temporal)   12/27/21 96.4 °F (35.8 °C)   12/13/21 98.4 °F (36.9 °C) (Infrared)     BP Readings from Last 3 Encounters:   01/31/22 114/75   01/17/22 115/75   01/10/22 112/81     Pulse Readings from Last 3 Encounters:   01/31/22 85   01/17/22 67   01/10/22 75         I have discussed the care of Kyle White, including pertinent history and exam findings with the resident. I have reviewed the key elements of all parts of the encounter with the resident. I agree with the assessment, plan and orders as documented by the resident.   (GE Modifier)

## 2022-04-09 NOTE — PROGRESS NOTES
[address]  [address] [place] [address] [address] [facility], [address]  Tel: [phone]  Fax: [phone]    Subjective     CC: post op right silver bunionectomy    Interval history:  Patient returns to clinic for post op visit. She has been non-compliant with keeping dressing intact. Has been wearing regular. Last time, Keflex was prescribed due to increased erythema to the surgical site. She denies pain. HPI:  Goran Humphries is a 61y.o. year old female who presents to clinic today for postoperative visit from 12/27/2021 right silver bunionectomy. Pt states she missed her appointment from last week. Pt states she has been showering. She states she has transitioned from surgical shoe to her boots. She denies any nausea, vomiting, fever, chills or SOB. Primary care physician is Le Craven MD.    ROS:    Constitutional: Denies nausea, vomiting, fever, chills. Neurologic: Denies numbness, tingling, and burning in the feet. Vascular: Denies symptoms of lower extremity claudication. Skin: Denies open wounds. Otherwise negative except as noted in the HPI. PMH:  Past Medical History:   Diagnosis Date    Acne     Dr. Peggy Simmons Dermatology. annual visits    Vargas's esophagus     for 20 yrs on and off. Radha Moody [address]    Bipolar disorder [facility])     recently diagnosed and placed on meds. No longer an issue since sobriety    Bunion of great toe of left foot     Bunion of great toe of right foot     Dr. Jorge Carmona CPM    Chronic obstructive pulmonary disease (COPD) ([facility] [address]     Dr. Nancy Duong Pulmonology. last visit 7/2021    Depression     On Meds. Dr. Silvia Frazier on [address] Dyspnea on exertion     Eating disorder     bulemia    Empyema ([facility] [address] 02/17/2014    left.  ICU hospitalization 2/2014 for 3 weeks    Fatigue due to depression     GERD (gastroesophageal reflux disease)     H/O chronic respiratory failure     Moderate persistent asthma     Pneumonia 02/2014    3 week hospitalization    Seasonal allergies     Smoking     Substance abuse ([facility] [address]     quit 2014    Wellness examination 2021    [facility]. next appointment 2021       Surgical History:   Past Surgical History:   Procedure Laterality Date    BUNIONECTOMY Right 2021    SILVER BUNIONECTOMY GREAT TOE (Right )    BUNIONECTOMY Right 2021    SILVER BUNIONECTOMY GREAT TOE performed by Daniela Aguirre DPM at [facility]    THORACOSCOPY  2014    Lt. VATS w/complete decortication    UPPER GASTROINTESTINAL ENDOSCOPY  10/13/2014    GE junction biopsy    UPPER GASTROINTESTINAL ENDOSCOPY N/A 11/10/2020    EGD BIOPSY performed by Jah Null MD at [place] [address] History:  Social History     Tobacco Use    Smoking status: Former Smoker     Packs/day: 1.00     Years: 34.00     Pack years: 34.00     Types: Cigarettes     Start date: 1977     Quit date: 2014     Years since quittin.9    Smokeless tobacco: Never Used   Vaping Use    Vaping Use: Never used   Substance Use Topics    Alcohol use: Not Currently     Comment: quit 2214    Drug use: No     Types: Cocaine     Comment: past hx of cocaine, marijuana, denies any IVDA       Medications:  Prior to Admission medications    Medication Sig Start Date End Date Taking?  Authorizing Provider   ondansetron (ZOFRAN) 4 MG tablet Take 1 tablet by mouth every 8 hours as needed for Nausea 21  Yes Mario Blair DPM   omeprazole (PRILOSEC) 40 MG delayed release capsule Take 1 capsule by mouth daily 21 Yes Latina Schwab, MD   ibuprofen (ADVIL;MOTRIN) 800 MG tablet Take 1 tablet by mouth 2 times daily as needed for Pain 21  Yes Latina Schwab, MD   senna-docusate (RA P COL-RITE) 8.6-50 MG per tablet take 2 tablets by mouth once daily if needed for constipation 21  Yes Latina Schwab, MD   tazarotene (AVAGE) 0.1 % cream Apply topically nightly. 12/8/21  Yes Fatimah Klein MD   ARNUITY ELLIPTA 200 MCG/ACT AEPB inhale 1 puff by mouth and INTO THE LUNGS once daily 7/26/21  Yes Ella Mariee MD   INCRUSE ELLIPTA 62.5 MCG/INH AEPB inhale 1 puff by mouth and INTO THE LUNGS once daily 7/26/21  Yes Ella Mariee MD   diphenhydrAMINE (BENADRYL) 25 MG capsule Take 2 capsules by mouth every 6 hours as needed for Itching 6/20/21  Yes Estuardo Olivares MD   famotidine (PEPCID) 20 MG tablet Take 1 tablet by mouth 2 times daily 6/20/21  Yes Estuardo Olivares MD   tiZANidine (ZANAFLEX) 4 MG tablet take 1 tablet by mouth three times a day 5/11/21  Yes Nasrin Macario MD   RA ALLERGY RELIEF 10 MG tablet take 1 tablet by mouth once daily 12/15/18  Yes Elliot Dubois MD   EPINEPHrine (EPIPEN 2-CRUZ) 0.3 MG/0.3ML SOAJ injection Use as directed for allergic reaction 12/21/17  Yes Javier Sauer MD   albuterol sulfate HFA (VENTOLIN HFA) 108 (90 Base) MCG/ACT inhaler Inhale 2 puffs into the lungs every 6 hours as needed for Wheezing 10/11/17  Yes Ella Mariee MD   Cholecalciferol (VITAMIN D3) 5000 units TABS Take 1 tablet by mouth daily   Yes Historical Provider, MD   ferrous sulfate 325 (65 FE) MG tablet take 1 tablet by mouth once daily WITH BREAKFAST 5/22/17  Yes Javier Sauer MD   TRINTELLIX 20 MG TABS tablet 30 mg  9/19/16  Yes Historical Provider, MD   atorvastatin (LIPITOR) 20 MG tablet Take 1 tablet by mouth daily  Patient not taking: Reported on 1/31/2022 12/13/21   Dania Garcia MD   aspirin 325 MG EC tablet Take 1 tablet by mouth daily 8/21/19 6/20/21  Italia Pichardo MD       Objective     Vitals:    01/31/22 1316   BP: 114/75   Pulse: 85       Lab Results   Component Value Date    LABA1C 5.0 05/11/2021       Physical Exam:  General:  Alert and oriented x3. In no acute distress.      Lower Extremity Physical Exam:    Vascular: DP and PT pulses are intact and palpable, Bilateral. CFT <3 seconds to all digits, Bilateral.  Mild edema appreciated to the right great toe at the incision site. Hair growth is absent to the level of the digits, Bilateral.     Neuro: Saph/sural/SP/DP/plantar sensation intact to light touch. Protective sensation is intact to 10/10 sites as tested with a 5.07g SWMF, Bilateral.     Musculoskeletal: EHL/FHL/GS/TA gross motor intact. Mild tenderness to palpation to the incision site of the right great toe. Dermatologic: surgical site is well healed. Mild erythema and swelling appreciated to the right incision site of the right foot, improved from last visit  Interdigital maceration absent, Bilateral.  Nails 1-10 are well trimmed. Imaging:       See media panel          Assessment   Christian Justin is a 61 y.o. female with     Diagnosis Orders   1. Status post right foot surgery  XR FOOT RIGHT (MIN 3 VIEWS)        Plan   · Patient examined and evaluated  · Diagnosis and treatment options discussed in detail  · Patient instructed to monitor for signs and symptoms of infection, including but not limited to increased erythema, increased edema, increased pain, purulent drainage, f/n/v/c/SOB/chest pain, and to call the clinic for an urgent appointment or to report to the ED should they experience these symptoms. The patient verbalized understanding  · The patient was educated on clinical findings, diagnosis and treatment plans. Patient states that she understands all that has been explained and all questions were answered to her apparent satisfaction. · Rx toe spacer  · X-ray of right foot ordered   · Patient to RTC in one month  · Please, call the office with any questions or concerns     No orders of the defined types were placed in this encounter.     Orders Placed This Encounter   Procedures    XR FOOT RIGHT (MIN 3 VIEWS)     Standing Status:   Future     Number of Occurrences:   1     Standing Expiration Date:   1/31/2023     Order Specific Question:   Reason for exam:     Answer:   post op bunionectomy     Niels Homans staple removal

## 2022-04-19 ENCOUNTER — OFFICE VISIT (OUTPATIENT)
Dept: PULMONOLOGY | Age: 57
End: 2022-04-19
Payer: MEDICARE

## 2022-04-19 VITALS
OXYGEN SATURATION: 98 % | HEIGHT: 66 IN | WEIGHT: 145 LBS | HEART RATE: 72 BPM | BODY MASS INDEX: 23.3 KG/M2 | TEMPERATURE: 97.6 F | DIASTOLIC BLOOD PRESSURE: 78 MMHG | SYSTOLIC BLOOD PRESSURE: 112 MMHG

## 2022-04-19 DIAGNOSIS — Z87.891 HISTORY OF SMOKING AT LEAST 1 PACK PER DAY FOR AT LEAST 30 YEARS: ICD-10-CM

## 2022-04-19 DIAGNOSIS — Z87.891 PERSONAL HISTORY OF TOBACCO USE: ICD-10-CM

## 2022-04-19 DIAGNOSIS — J44.9 CHRONIC OBSTRUCTIVE PULMONARY DISEASE, UNSPECIFIED COPD TYPE (HCC): Primary | ICD-10-CM

## 2022-04-19 DIAGNOSIS — J45.30 MILD PERSISTENT ASTHMA WITHOUT COMPLICATION: ICD-10-CM

## 2022-04-19 DIAGNOSIS — Z87.891 HISTORY OF SMOKING 30 OR MORE PACK YEARS: ICD-10-CM

## 2022-04-19 PROCEDURE — 1036F TOBACCO NON-USER: CPT | Performed by: INTERNAL MEDICINE

## 2022-04-19 PROCEDURE — 99214 OFFICE O/P EST MOD 30 MIN: CPT | Performed by: INTERNAL MEDICINE

## 2022-04-19 PROCEDURE — 3017F COLORECTAL CA SCREEN DOC REV: CPT | Performed by: INTERNAL MEDICINE

## 2022-04-19 PROCEDURE — 3023F SPIROM DOC REV: CPT | Performed by: INTERNAL MEDICINE

## 2022-04-19 PROCEDURE — G8427 DOCREV CUR MEDS BY ELIG CLIN: HCPCS | Performed by: INTERNAL MEDICINE

## 2022-04-19 PROCEDURE — G0296 VISIT TO DETERM LDCT ELIG: HCPCS | Performed by: INTERNAL MEDICINE

## 2022-04-19 PROCEDURE — G8420 CALC BMI NORM PARAMETERS: HCPCS | Performed by: INTERNAL MEDICINE

## 2022-04-19 RX ORDER — ALBUTEROL SULFATE 90 UG/1
2 AEROSOL, METERED RESPIRATORY (INHALATION) EVERY 6 HOURS PRN
Qty: 1 EACH | Refills: 11 | Status: SHIPPED | OUTPATIENT
Start: 2022-04-19

## 2022-04-19 ASSESSMENT — SLEEP AND FATIGUE QUESTIONNAIRES
HOW LIKELY ARE YOU TO NOD OFF OR FALL ASLEEP WHILE SITTING INACTIVE IN A PUBLIC PLACE: 0
HOW LIKELY ARE YOU TO NOD OFF OR FALL ASLEEP WHEN YOU ARE A PASSENGER IN A CAR FOR AN HOUR WITHOUT A BREAK: 1
ESS TOTAL SCORE: 7
HOW LIKELY ARE YOU TO NOD OFF OR FALL ASLEEP WHILE SITTING AND TALKING TO SOMEONE: 0
HOW LIKELY ARE YOU TO NOD OFF OR FALL ASLEEP WHILE LYING DOWN TO REST IN THE AFTERNOON WHEN CIRCUMSTANCES PERMIT: 3
HOW LIKELY ARE YOU TO NOD OFF OR FALL ASLEEP WHILE SITTING AND READING: 1
HOW LIKELY ARE YOU TO NOD OFF OR FALL ASLEEP WHILE WATCHING TV: 1
HOW LIKELY ARE YOU TO NOD OFF OR FALL ASLEEP WHILE SITTING QUIETLY AFTER LUNCH WITHOUT ALCOHOL: 1
HOW LIKELY ARE YOU TO NOD OFF OR FALL ASLEEP IN A CAR, WHILE STOPPED FOR A FEW MINUTES IN TRAFFIC: 0

## 2022-04-19 NOTE — LETTER
[id number] [facility], [address] [place] [id number]  Phone: [phone]  Fax: [phone]    Esequiel Perkins MD    April 19, 2022     Ivett Sorto MD  [address]  [address]    Patient: Leda Law   MR Number: [id number]   YOB: 1965   Date of Visit: 4/19/2022       Dear Ivett Sorto: Thank you for referring Rory Alves to me for evaluation/treatment. Below are the relevant portions of my assessment and plan of care. If you have questions, please do not hesitate to call me. I look forward to following Barb Troncoso along with you.     Sincerely,      Esequiel Perkins MD

## 2022-04-19 NOTE — PROGRESS NOTES
PULMONARY OUTPATIENT PROGRESS NOTE        Patient:  Tina Chopra  YOB: 1965    MRN: [id number]     Acct:        Pt seen and Chart reviewed. Mr/Ms Tina Chopra is here in followup for    Diagnosis   1. Chronic obstructive pulmonary disease, unspecified COPD type ([address]    2. Mild persistent asthma without complication    3. Fatigue, unspecified type    4. Psychophysiological insomnia      She is here for follow-up of asthmatic bronchitis/COPD, history of insomnia and chronic fatigue. According to patient recently she started doing communion and she had to go stairs up and she get shortness of breath while using stairs according patient to 3 years ago she was able to use the stairs without significant problem but now she cannot use stairs and stop using stairs when she restarted again recently. She also have cough which she never had according to patient off and on last year or so but denies daily or persistent cough. She does not complain of sputum production and denies wheezing. She denies nocturnal awakening with cough wheezing chest tightness or shortness of breath. She does not complain of chest pain dizziness lightheadedness. She is able to walk on flat surfaces and denies any shortness of breath on those activities she does not have any limitation of activities at this time. She is taking Incruse once daily and she is on Arnuity once daily also she did not use albuterol for some time rarely requires albuterol.     Low-dose CT chest in October 2021 shows stable lung nodules without much change    Last CT scan for screening was done in October 2020 CT scan showed 3 to 4 mm right middle lobe and right lower lobe nodule which has been stable since 2019 and likely present on CT scan from 2016 at that time      Previous history and workup  Her last pulmonary function test showed airway reversibility and normal lung volume and normal diffusion capacity and she most likely have more asthmatic bronchitis than fixed obstruction. H/O pleural empyema and decortication in 2014, h/o asthma / COPD  H/O RML lung nodule and follow up CT did not show nodule and did not require further Ct scan  H/O smoking 37 years 1 PPD and stopped in 2014      Subjective:   Review of Systems -  General ROS: Negative for fatigue negative for weight loss fever night sweats  ENT ROS: Negative for - epistaxis, headaches, nasal congestion, nasal polyps, tinnitus, vertigo or visual changes  Allergy and Immunology ROS: negative for - nasal congestion or postnasal drip  Respiratory ROS: Positive for dyspnea on severe exertion only and negative for - cough, hemoptysis, orthopnea, shortness of breath, sputum changes, tachypnea or wheezing  Cardiovascular ROS:  -Negative for dyspnea on activities, negative for edema, irregular heartbeat, loss of consciousness, murmur, orthopnea, paroxysmal nocturnal dyspnea or rapid heart rate  Gastrointestinal ROS: no abdominal pain, change in bowel habits, or black or bloody stools  Musculoskeletal ROS: negative for - gait disturbance, joint pain or joint stiffness  CNS: negative for dizziness diplopia weakness tingling numbness syncope  Skin and lymphatics: negative for skin rash and skin lesion  Hem/onc: negative for petechia, easy bleeding and bruising    Sleep Medicine 7/27/2021   Sitting and reading 1   Watching TV 1   Sitting, inactive in a public place (e.g. a theatre or a meeting) 0   As a passenger in a car for an hour without a break 0   Lying down to rest in the afternoon when circumstances permit 1   Sitting and talking to someone 0   Sitting quietly after a lunch without alcohol 1   In a car, while stopped for a few minutes in traffic 0   Total score 4       Allergies:   Allergies   Allergen Reactions    Sesame [Oleum Sesami] Anaphylaxis     Sesame seeds    Codeine Nausea And Vomiting       Medications:  Outpatient Encounter Medications as of 4/19/2022   Medication Sig Dispense Refill    fluticasone (FLONASE) 50 MCG/ACT nasal spray 1 spray by Each Nostril route daily 32 g 1    ARNUITY ELLIPTA 200 MCG/ACT AEPB inhale 1 puff by mouth and INTO THE LUNGS once daily 1 each 4    INCRUSE ELLIPTA 62.5 MCG/INH AEPB inhale 1 puff by mouth and INTO THE LUNGS once daily 1 each 4    ondansetron (ZOFRAN) 4 MG tablet Take 1 tablet by mouth every 8 hours as needed for Nausea 20 tablet 0    ibuprofen (ADVIL;MOTRIN) 800 MG tablet Take 1 tablet by mouth 2 times daily as needed for Pain 60 tablet 0    senna-docusate (RA P COL-RITE) 8.6-50 MG per tablet take 2 tablets by mouth once daily if needed for constipation 30 tablet 3    tazarotene (AVAGE) 0.1 % cream Apply topically nightly.  30 g 5    diphenhydrAMINE (BENADRYL) 25 MG capsule Take 2 capsules by mouth every 6 hours as needed for Itching 30 capsule 0    famotidine (PEPCID) 20 MG tablet Take 1 tablet by mouth 2 times daily 10 tablet 0    tiZANidine (ZANAFLEX) 4 MG tablet take 1 tablet by mouth three times a day 30 tablet 0    RA ALLERGY RELIEF 10 MG tablet take 1 tablet by mouth once daily 30 tablet 0    EPINEPHrine (EPIPEN 2-CRUZ) 0.3 MG/0.3ML SOAJ injection Use as directed for allergic reaction 1 each 2    albuterol sulfate HFA (VENTOLIN HFA) 108 (90 Base) MCG/ACT inhaler Inhale 2 puffs into the lungs every 6 hours as needed for Wheezing 1 Inhaler 3    Cholecalciferol (VITAMIN D3) 5000 units TABS Take 1 tablet by mouth daily      ferrous sulfate 325 (65 FE) MG tablet take 1 tablet by mouth once daily WITH BREAKFAST 30 tablet 3    TRINTELLIX 20 MG TABS tablet 30 mg   0    atorvastatin (LIPITOR) 20 MG tablet Take 1 tablet by mouth daily (Patient not taking: Reported on 1/31/2022) 120 tablet 3    omeprazole (PRILOSEC) 40 MG delayed release capsule Take 1 capsule by mouth daily 60 capsule 3    aspirin 325 MG EC tablet Take 1 tablet by mouth daily 42 tablet 0     No facility-administered encounter medications on file as of 4/19/2022. Objective:    Physical Exam:  Vitals:   /78 (Site: Left Upper Arm, Position: Sitting, Cuff Size: Small Adult)   Pulse 72   Temp 97.6 °F (36.4 °C)   Ht 5' 6\" (1.676 m)   Wt 145 lb (65.8 kg)   SpO2 98%   BMI 23.40 kg/m²   Last 3 weights: Wt Readings from Last 3 Encounters:   04/19/22 145 lb (65.8 kg)   01/31/22 145 lb (65.8 kg)   01/17/22 144 lb (65.3 kg)     Body mass index is 23.4 kg/m².     Physical Examination:   General appearance - alert, well appearing, and in no distress  Mental status - alert, oriented to person, place, and time  Eyes - pupils equal and reactive, extraocular eye movements intact  Ears - right ear normal, left ear normal  Nose - normal and patent, no erythema, discharge or polyps  Mouth - mucous membranes moist, pharynx normal without lesions  Neck - supple, no significant adenopathy  Chest -slightly distant breath sounds but present bilaterally clear to auscultation, no wheezes, rales or rhonchi, symmetric air entry  Heart - normal rate, regular rhythm, normal S1, S2, no murmurs, rubs, clicks or gallops  Abdomen - soft, nontender, nondistended, no masses or organomegaly  Neurological - alert, oriented, normal speech, no focal findings or movement disorder noted  Extremities - peripheral pulses normal, no pedal edema, no clubbing or cyanosis  Skin - normal coloration and turgor, no rashes, no suspicious skin lesions noted       Labs:  None    CBC:   WBC   Date Value Ref Range Status   10/21/2020 6.0 3.5 - 11.0 k/uL Final     Hemoglobin   Date Value Ref Range Status   10/21/2020 13.0 12.0 - 16.0 g/dL Final     Platelets   Date Value Ref Range Status   10/21/2020 302 140 - 450 k/uL Final     BMP:   Sodium   Date Value Ref Range Status   05/12/2021 144 135 - 144 mmol/L Final     Potassium   Date Value Ref Range Status   05/12/2021 4.0 3.7 - 5.3 mmol/L Final     Chloride   Date Value Ref Range Status   05/12/2021 105 98 - 107 mmol/L Final     CO2   Date Value Ref Range Status   05/12/2021 24 20 - 31 mmol/L Final     BUN   Date Value Ref Range Status   12/13/2021 12 6 - 20 mg/dL Final     CREATININE   Date Value Ref Range Status   12/13/2021 0.63 0.50 - 0.90 mg/dL Final   05/12/2021 0.64 0.50 - 0.90 mg/dL Final   10/21/2020 0.60 0.50 - 0.90 mg/dL Final     Glucose   Date Value Ref Range Status   12/13/2021 77 70 - 99 mg/dL Final     S. Calcium:   Calcium   Date Value Ref Range Status   05/12/2021 9.6 8.6 - 10.4 mg/dL Final     S. Ionized Calcium: No results found for: IONCA  S. Magnesium: No results found for: MG  S. Phosphorus: No results found for: PHOS  S. Glucose:   POC Glucose   Date Value Ref Range Status   02/19/2014 79 65 - 105 mg/dL Final   02/18/2014 126 (H) 65 - 105 mg/dL Final   02/17/2014 117 (H) 65 - 105 mg/dL Final     Glycosylated hemoglobin A1C:   Hemoglobin A1C   Date Value Ref Range Status   05/11/2021 5.0 % Final       Pulmonary Functions Testing Results:  01/23/17 FEV1 2.07 76%, post BD 86%(12% improvement), FVC 2.94 88%, TLC 4.65 87%, DLCO 17.67 86%    8/3/15: FEV1 1.72 63%,Post BD 83%  and 32% improvemnent, FVC PBD 3.19 88%, YLO1OWV 93% TLC 1.70 85% , DLCO 4.12 86%    Blood Gases: No results found for: PH, PCO2, PO2, HCO3, O2SAT    Radiological reports:    CXR 8/12/2014  IMPRESSION:       No acute radiographic abnormality, or significant interval change. Mild    bibasilar subsegmental atelectasis and/or scarring. CT Scans    Low-dose CT chest 10/18/2021.  1. No change in right middle and right lower lobe pulmonary nodules. 2. Mild emphysematous changes. 3. No change in a hypodense lesion in the left lobe of the liver. 4. No mediastinal adenopathy.  Granulomatous changes.           Low-dose CT chest 10/16/2020  1. Stable 3-4 mm pulmonary nodules in the right lower lobe and right middle   lobe.  Mild emphysema. 2. Old granulomatous disease. 3. Moderate hiatal hernia. 4. Trace pericardial fluid. 5. Stable low-density lesion measuring 1 cm in the left hepatic lobe,   unchanged from 09/10/2019. Low-dose CT chest 09/10/2019  Mediastinum:  Heart size at the upper limit of normal.  Trace pericardial   effusion.  Thoracic aorta is normal caliber.  No lymphadenopathy.  Calcified   right hilar lymph nodes.  Thyroid and esophagus are unremarkable.       Lungs/Pleura:  No consolidation or pleural effusion.  Mild left apical   scarring.  Mild scarring at the lung bases.  Stable 4 mm nodules in the right   lower lobe on image 69 and right middle lobe on image 67.  Scattered   calcified granulomas. .         04/26/2016  Mild scarring changes similar to May 8, 2014 radiography. Calcified right hilar lymph nodes and right lung granulomas suggesting prior granulomatous disease. Small hiatal hernia.           Immunization History   Administered Date(s) Administered    COVID-19, Pfizer Purple top, DILUTE for use, 12+ yrs, 30mcg/0.3mL dose 02/25/2021, 03/18/2021, 01/22/2022    Influenza Virus Vaccine 02/13/2014    Influenza, Eliza Bhargavner, IM, (6 mo and older Fluzone, Flulaval, Fluarix and 3 yrs and older Afluria) 12/21/2017    Influenza, Eliza Finner, IM, PF (6 mo and older Fluzone, Flulaval, Fluarix, and 3 yrs and older Afluria) 11/20/2018, 01/03/2020, 12/08/2020, 12/13/2021    Influenza, Triv, 3 Years and older, IM (Afluria (5 yrs and older) 11/08/2016    Pneumococcal Conjugate 13-valent ([id number]) 11/08/2016    Pneumococcal Polysaccharide ([id number]) 02/13/2014    Tdap (Boostrix, Adacel) 04/30/2014    Zoster Recombinant (Shingrix) 12/14/2021       Assessment:      1. Chronic obstructive pulmonary disease, unspecified COPD type ([facility] [address]   2. Mild persistent asthma without complication   3. Fatigue, unspecified type improved   4.  Psychophysiological insomnia improve       She is complaining of increasing shortness of breath although shortness of breath is limited mostly to a pill task/walking stairs up otherwise not much change but something she did not have before although for last few years she has not been using stairs and other etiology like deconditioning or cardiac etiology may also contribute. We will get pulmonary function test done and will add LABA to ICS she is taking advised to continue with LAMA. She also complained of cough intermittently for last year or so which she did not have before she had a CT low-dose done in October which did not show any change in nodule there was no other consolidation infiltrate or acute or chronic changes seen at that time. Plan:      Pulmonary function test within next few weeks  Continue with Incruse once daily. Start Breo once daily. Discontinue Arnuity was Yuli Salk was started  Technique of using medication discussed. Albuterol as needed  Medications refills done  She had Covid vaccine already and had booster  Annual Flu vaccine commended in fall  Uptodate on pneumonia vaccine   Maintain an active lifestyle   As she had history of smoking for 37 years and stopped 7 years ago she will need yearly CT low dose for screening CT scan and next one will be October 2022 requested. Regular sleep-wake cycle. Sleep hygiene discussed. Given instruction for sleep hygiene. Adequate sleep duration    RTC 6 months or early if any worsening symptoms  Questions answered to pt's satisfaction      Please note that this chart was generated using voice recognition Dragon dictation software. Although every effort was made to ensure the accuracy of this automated transcription, some errors in transcription may have occurred.     Milagros Briseno MD, MD             4/19/2022, 9:59 AM     Low Dose CT (LDCT) Lung Screening criteria met   Age 50-69   Pack year smoking >30   Still smoking or less than 15 year since quit   No sign or symptoms of lung cancer   > 11 months since last LDCT     Risks and benefits of lung cancer screening with LDCT scans discussed:    Significance of positive screen - False-positive LDCT results often occur. 95% of all positive results do not lead to a diagnosis of cancer. Usually further imaging can resolve most false-positive results; however, some patients may require invasive procedures. Over diagnosis risk - 10% to 12% of screen-detected lung cancer cases are over diagnosed--that is, the cancer would not have been detected in the patient's lifetime without the screening. Need for follow up screens annually to continue lung cancer screening effectiveness     Risks associated with radiation from annual LDCT- Radiation exposure is about the same as for a mammogram, which is about 1/3 of the annual background radiation exposure from everyday life. Starting screening at age 54 is not likely to increase cancer risk from radiation exposure. Patients with comorbidities resulting in life expectancy of < 10 years, or that would preclude treatment of an abnormality identified on CT, should not be screened due to lack of benefit. To obtain maximal benefit from this screening, smoking cessation and long-term abstinence from smoking is critical          Low Dose CT (LDCT) Lung Screening criteria met:     Age 50-77(Medicare) or 50-80 ([facility])   Pack year smoking >20   Still smoking or less than 15 year since quit   No sign or symptoms of lung cancer   > 11 months since last LDCT     Risks and benefits of lung cancer screening with LDCT scans discussed:    Significance of positive screen - False-positive LDCT results often occur. 95% of all positive results do not lead to a diagnosis of cancer. Usually further imaging can resolve most false-positive results; however, some patients may require invasive procedures. Over diagnosis risk - 10% to 12% of screen-detected lung cancer cases are over diagnosed--that is, the cancer would not have been detected in the patient's lifetime without the screening.     Need for follow up screens annually to continue lung cancer screening effectiveness     Risks associated with radiation from annual LDCT- Radiation exposure is about the same as for a mammogram, which is about 1/3 of the annual background radiation exposure from everyday life. Starting screening at age 54 is not likely to increase cancer risk from radiation exposure. Patients with comorbidities resulting in life expectancy of < 10 years, or that would preclude treatment of an abnormality identified on CT, should not be screened due to lack of benefit.     To obtain maximal benefit from this screening, smoking cessation and long-term abstinence from smoking is critical

## 2022-04-20 ENCOUNTER — OFFICE VISIT (OUTPATIENT)
Dept: DERMATOLOGY | Age: 57
End: 2022-04-20
Payer: MEDICARE

## 2022-04-20 ENCOUNTER — HOSPITAL ENCOUNTER (OUTPATIENT)
Age: 57
Setting detail: SPECIMEN
Discharge: HOME | End: 2022-04-20

## 2022-04-20 VITALS
DIASTOLIC BLOOD PRESSURE: 75 MMHG | TEMPERATURE: 98.6 F | SYSTOLIC BLOOD PRESSURE: 130 MMHG | HEART RATE: 74 BPM | WEIGHT: 145.2 LBS | BODY MASS INDEX: 22.79 KG/M2 | OXYGEN SATURATION: 96 % | HEIGHT: 67 IN

## 2022-04-20 DIAGNOSIS — L70.0 ACNE VULGARIS: Primary | ICD-10-CM

## 2022-04-20 DIAGNOSIS — Z79.899 ON ISOTRETINOIN THERAPY: ICD-10-CM

## 2022-04-20 LAB
ALT SERPL-CCNC: 16 U/L (ref 5–33)
AST SERPL-CCNC: 18 U/L
TRIGL SERPL-MCNC: 160 MG/DL

## 2022-04-20 PROCEDURE — 99214 OFFICE O/P EST MOD 30 MIN: CPT | Performed by: DERMATOLOGY

## 2022-04-20 PROCEDURE — G8427 DOCREV CUR MEDS BY ELIG CLIN: HCPCS | Performed by: DERMATOLOGY

## 2022-04-20 PROCEDURE — G8420 CALC BMI NORM PARAMETERS: HCPCS | Performed by: DERMATOLOGY

## 2022-04-20 PROCEDURE — 1036F TOBACCO NON-USER: CPT | Performed by: DERMATOLOGY

## 2022-04-20 PROCEDURE — 3017F COLORECTAL CA SCREEN DOC REV: CPT | Performed by: DERMATOLOGY

## 2022-04-20 RX ORDER — ISOTRETINOIN 30 MG/1
30 CAPSULE ORAL DAILY
Qty: 30 CAPSULE | Refills: 0 | Status: SHIPPED | OUTPATIENT
Start: 2022-04-20 | End: 2022-05-18

## 2022-04-20 NOTE — PROGRESS NOTES
Patient Enrollment Complete. Your patients REMS ID [id number]    Your patient will receive additional information by their preferred method of communication.

## 2022-04-20 NOTE — PROGRESS NOTES
02/19/2014    Hypoxia 02/13/2014    Dental caries 09/17/2013    Smoking 09/17/2013    Vargas's esophagus without dysplasia 05/10/2012    Acne 05/10/2012       CURRENT MEDICATIONS:   Current Outpatient Medications   Medication Sig Dispense Refill    Fluticasone furoate-vilanterol (BREO ELLIPTA) 200-25 MCG/INH AEPB inhaler Inhale 1 puff into the lungs daily 1 each 11    albuterol sulfate HFA (VENTOLIN HFA) 108 (90 Base) MCG/ACT inhaler Inhale 2 puffs into the lungs every 6 hours as needed for Wheezing or Shortness of Breath 1 each 11    fluticasone (FLONASE) 50 MCG/ACT nasal spray 1 spray by Each Nostril route daily 32 g 1    ARNUITY ELLIPTA 200 MCG/ACT AEPB inhale 1 puff by mouth and INTO THE LUNGS once daily 1 each 4    INCRUSE ELLIPTA 62.5 MCG/INH AEPB inhale 1 puff by mouth and INTO THE LUNGS once daily 1 each 4    ondansetron (ZOFRAN) 4 MG tablet Take 1 tablet by mouth every 8 hours as needed for Nausea 20 tablet 0    atorvastatin (LIPITOR) 20 MG tablet Take 1 tablet by mouth daily 120 tablet 3    omeprazole (PRILOSEC) 40 MG delayed release capsule Take 1 capsule by mouth daily 60 capsule 3    ibuprofen (ADVIL;MOTRIN) 800 MG tablet Take 1 tablet by mouth 2 times daily as needed for Pain 60 tablet 0    senna-docusate (RA P COL-RITE) 8.6-50 MG per tablet take 2 tablets by mouth once daily if needed for constipation 30 tablet 3    tazarotene (AVAGE) 0.1 % cream Apply topically nightly.  30 g 5    diphenhydrAMINE (BENADRYL) 25 MG capsule Take 2 capsules by mouth every 6 hours as needed for Itching 30 capsule 0    famotidine (PEPCID) 20 MG tablet Take 1 tablet by mouth 2 times daily 10 tablet 0    tiZANidine (ZANAFLEX) 4 MG tablet take 1 tablet by mouth three times a day 30 tablet 0    aspirin 325 MG EC tablet Take 1 tablet by mouth daily 42 tablet 0    EPINEPHrine (EPIPEN 2-CRUZ) 0.3 MG/0.3ML SOAJ injection Use as directed for allergic reaction 1 each 2    Cholecalciferol (VITAMIN D3) 5000 units TABS Take 1 tablet by mouth daily      ferrous sulfate 325 (65 FE) MG tablet take 1 tablet by mouth once daily WITH BREAKFAST 30 tablet 3    TRINTELLIX 20 MG TABS tablet 30 mg   0     No current facility-administered medications for this visit. ALLERGIES:   Allergies   Allergen Reactions    Sesame [Oleum Sesami] Anaphylaxis     Sesame seeds    Codeine Nausea And Vomiting       SOCIAL HISTORY:  Social History     Tobacco Use    Smoking status: Former Smoker     Packs/day: 1.00     Years: 34.00     Pack years: 34.00     Types: Cigarettes     Start date: 1977     Quit date: 2014     Years since quittin.1    Smokeless tobacco: Never Used   Substance Use Topics    Alcohol use: Not Currently     Comment: quit 2214       Pertinent ROS:  Review of Systems  Skin: Denies any new changing, growing or bleeding lesions or rashes except as described in the HPI   Constitutional: Denies fevers, chills, and malaise. PHYSICAL EXAM:   /75 (Site: Left Upper Arm, Position: Sitting, Cuff Size: Medium Adult)   Pulse 74   Temp 98.6 °F (37 °C)   Ht 5' 7\" (1.702 m)   Wt 145 lb 3.2 oz (65.9 kg)   SpO2 96%   BMI 22.74 kg/m²     The patient is generally well appearing, well nourished, alert and conversational. Affect is normal.    Cutaneous Exam:  Physical Exam  Face and neck only was examined. Facial covering was removed during examination. Diagnoses/exam findings/medical history pertinent to this visit are listed below:    Assessment:   Diagnosis Orders   1. Acne vulgaris     2. On isotretinoin therapy  AST    ALT    Triglyceride        Plan:  Acne vulgaris  - chronic illness with progression and/or exacerbation  - female of non-childbearing potential (s/p hysterectomy)  Isotretinoin start  - Ipledge program consent obtained. The patient signed and initialed all requisite fields as a testament to his understanding of the program requirements.   iPLEDGE program consent forms scanned into chart.  - Goal isotretinoin dosage: 9900 mg  (66 kg) * 150 mg/kg)  - Anticipated initial isotretinoin dose: 30 mg daily  - Patient counseled to not share the medication with anyone nor donate blood while on isotretinoin       - Check baseline AST, ALT, and Triglycerides, then after 1 month of therapy, then repeat only if increasing dose or if abnormal  - Side effects of isotretinoin discussed, including teratogenicity, dryness, nosebleeds, muscle aches and pains, headaches, pseudotumor cerebri (which may manifest as severe headaches and vision changes), liver dysfunction, hypertriglyceridemia (which could lead to acute pancreatitis), mood changes, depression, psychosis, and suicide. They understand that they cannot donate blood while on isotretinoin and not to share the drug with anyone. Patient is to avoid tetracyclines and vitamin A supplements while on isotretinoin. Patient will not have any cosmetic procedures while on isotretinoin due to increased risk of scarring. The patient should discontinue isotretinoin and report to the ER for vision change or headaches not relieved by OTC pain relievers. Pt is to inform all other medical providers that pt is on isotretinoin. Patient counseled to immediately notify provider of any concerning side effects  - despite mental health history, patient is quite stable on psych meds and per her report, has been cleared by psychiatrist to start isotretinoin    On isotretinoin therapy  - complete labs as ordered    RTC 1 month with Carlos Ortiz    Future Appointments   Date Time Provider Zane Schaefer   5/18/2022 10:30 AM SCHEDULE, [facility] SPEC Resp Spec [email or web address]   10/19/2022 11:00 AM [facility] CT ROOM 1 [facility] CT SCAN ST Radiolog   10/25/2022  9:45 AM Joseph De Dios MD Resp Spec [email or web address]         Patient Instructions   Complete labs as ordered    Patient Education Regarding Isotretinion    Isotretinoin is a good medication for many people struggling with severe acne.   Most people tolerate it well. However, isotretinoin is a medication that does have some potentially serious side effects. The most serious side effect occurs when someone who is pregnant takes isotretinoin; therefore, someone who is pregnant must never be exposed to isotretinoin. Taking isotretinoin while pregnant has a high chance of causing severe birth defects or deformities in the baby. Because of this serious effect, a national program called I-pledge was developed to closely monitor the use of Isotretinoin. Everyone started on isotretinoin, male or female, must be enrolled in the I pledge program which pledges to prevent pregnancy in those taking isotretinoin. I-pledge Counseling Reviewed: If you are a female and become pregnant on isotretinoin, there is a high likelihood that the baby will have serious birth defects. Therefore, in order to be started on isotretinoin, females must be on 2 forms of birth control. The types of birth control acceptable for isotretinoin use will be discussed with you by your physician. It is important that you remain on the 2 forms of birth control the entire time that you are on isotretinoin and for one month after stopping isotretinoin. If you have sexual intercourse without using the 2 forms of birth control or if there is any chance that you could be pregnant, it is important that you immediately stop your isotretinoin and notify your physician. You must also have a pregnancy test monthly. While on isotretinoin, you must never share your medication with anyone else. You must also never donate your blood while on isotretinoin and for one month after. Prior to filling your prescription each month, you will need to take an online test to verify your knowledge regarding the dangers of becoming pregnant while on isotretinoin. If you are a male, you should use condoms if you are sexually active to prevent pregnancy in your partner while on isotretinoin.  A small amount of the isotretinoin drug is present in the semen of men who take it, and it is important not to expose females to this isotretinoin during sexual intercourse. You must never share your medication with anyone else. You must also never donate your blood while on isotretinoin and for one month after. Other potential side effects:  Common side effects that may occur during the course of treatment include severely chapped lips, nose bleeds, eye dryness, dry skin, hair thinning, joint aches, and muscle aches. While patients are on isotretinoin, their skin may heal poorly or more slowly. Patients are also very sensitive to the sun and at risk of having severe sunburns while taking isotretinoin. Regular use of a lip balm, Vaseline, or Aquaphor to the lips is important to prevent excess chapping. Vaseline can be put in the nostrils at night to prevent nose bleeds. Facial moisturizers that are noncomedogenic (wont clog pores) can be used on the face and regular moisturizers can be used on the body. Patients can sometimes not use their contact lenses while on isotretinoin and may need to use eye lubricants or artificial tears. Patients can sometimes experience nearsightedness when driving at night. Over the counter ibuprofen is fine to take for muscle and joint pain. Avoid Tylenol or acetaminophen. Please notify your physician if muscle or joint pain is not controlled with over the counter ibuprofen. Avoid body piercing, and elective procedures that involve cutting or lasering the skin while on isotretinoin. Avoid prolonged sun exposure and wear sunscreen and sun protective clothing to prevent sunburns especially during spring and summer months. Other rare but more serious side effects may occur on isotretinoin.  These include depression or other mood disorders, increased production of spinal fluid, inflammation of the liver, inflammation of the pancreas, elevated cholesterol or triglyceride levels, and blood cell abnormalities. Although these side effects are rare and most patients do not develop them, patients on isotretinoin need to be monitored closely with monthly labs and monthly visits with your doctor. It is important to never drink alcohol while on isotretinoin as this may increase the likelihood of inflammation of the liver. It is important to be honest with us about any changes in your mood, depression, or suicidal thoughts while on isotretinion. We also need to be made aware of recurrent or severe headaches that do not respond to over the counter medications or are associated with blurry vision. The labs must be obtained after fasting, meaning no food or drink other than water for 12 hours before the test. We cannot refill your isotretinoin unless you return for your monthly appointments and have your monthly labs performed. If you have inflammatory bowel disease, taking isotretinoin can worsen your symptoms. Some people have developed inflammatory bowel disease while on isotretinoin or after stopping it. Studies have not concluded that there is a direct causative relationship between isotretinoin and inflammatory bowel disease. Other medications:   Patients should stop all other acne medications while on isotretinoin including both oral and topical medications. Your dermatologist will review your other medications to make sure these are safe to continue while on isotretinoin. Please notify all physicians that treat you that you are on isotretinoin so that they are aware of this when prescribing new medications. Do not take a multivitamin or vitamin A supplement while on isotretinoin. I, Yaritza Prieto, personally scribed the services dictated to me by Dr. Samira Brito in this documentation. I, Dr. Samira Brito, personally performed the services described in this documentation, as scribed by Anuradha Gillis in my presence, and it is both accurate and complete.     Electronically signed by Alban Fox Mundo Jade MD on 4/20/2022 at 9:45 AM

## 2022-05-17 ENCOUNTER — OFFICE VISIT (OUTPATIENT)
Dept: DERMATOLOGY | Age: 57
End: 2022-05-17
Payer: MEDICARE

## 2022-05-17 ENCOUNTER — HOSPITAL ENCOUNTER (OUTPATIENT)
Age: 57
Setting detail: SPECIMEN
Discharge: HOME | End: 2022-05-17
Payer: MEDICARE

## 2022-05-17 VITALS
HEART RATE: 79 BPM | OXYGEN SATURATION: 98 % | TEMPERATURE: 97 F | HEIGHT: 67 IN | BODY MASS INDEX: 24.17 KG/M2 | DIASTOLIC BLOOD PRESSURE: 78 MMHG | SYSTOLIC BLOOD PRESSURE: 107 MMHG | WEIGHT: 154 LBS

## 2022-05-17 DIAGNOSIS — L70.0 ACNE VULGARIS: ICD-10-CM

## 2022-05-17 DIAGNOSIS — L70.0 ACNE VULGARIS: Primary | ICD-10-CM

## 2022-05-17 LAB
ALBUMIN SERPL-MCNC: 4.5 G/DL (ref 3.5–5.2)
ALBUMIN/GLOBULIN RATIO: 1.9 (ref 1–2.5)
ALP BLD-CCNC: 66 U/L (ref 35–104)
ALT SERPL-CCNC: 16 U/L (ref 5–33)
AST SERPL-CCNC: 22 U/L
BILIRUB SERPL-MCNC: 0.21 MG/DL (ref 0.3–1.2)
BILIRUBIN DIRECT: <0.08 MG/DL
BILIRUBIN, INDIRECT: ABNORMAL MG/DL (ref 0–1)
CHOLESTEROL, FASTING: 179 MG/DL
CHOLESTEROL/HDL RATIO: 3.1
HDLC SERPL-MCNC: 58 MG/DL
LDL CHOLESTEROL: 103 MG/DL (ref 0–130)
TOTAL PROTEIN: 6.9 G/DL (ref 6.4–8.3)
TRIGLYCERIDE, FASTING: 89 MG/DL

## 2022-05-17 PROCEDURE — G8427 DOCREV CUR MEDS BY ELIG CLIN: HCPCS | Performed by: PHYSICIAN ASSISTANT

## 2022-05-17 PROCEDURE — G8420 CALC BMI NORM PARAMETERS: HCPCS | Performed by: PHYSICIAN ASSISTANT

## 2022-05-17 PROCEDURE — 3017F COLORECTAL CA SCREEN DOC REV: CPT | Performed by: PHYSICIAN ASSISTANT

## 2022-05-17 PROCEDURE — 36415 COLL VENOUS BLD VENIPUNCTURE: CPT

## 2022-05-17 PROCEDURE — 80061 LIPID PANEL: CPT

## 2022-05-17 PROCEDURE — 1036F TOBACCO NON-USER: CPT | Performed by: PHYSICIAN ASSISTANT

## 2022-05-17 PROCEDURE — 80076 HEPATIC FUNCTION PANEL: CPT

## 2022-05-17 PROCEDURE — 99214 OFFICE O/P EST MOD 30 MIN: CPT | Performed by: PHYSICIAN ASSISTANT

## 2022-05-17 NOTE — PROGRESS NOTES
Dermatology Patient Note  [address]  [place] [id number]  Dept: [phone]  Dept Fax: [phone]      VISITDATE: 5/17/2022   REFERRING PROVIDER: No ref. provider found      Real Falls is a 61 y.o. female  who presents today in the office for:    Follow-up (pt states she has been doing well and has been clearing up, minimal breakouts. )      HISTORY OF PRESENT ILLNESS:  Chet Villarreal is on Isotretinoin. She is seen today for her monthly follow-up evaluation. Interim disease course: acne is greatly improved    The patient is unable to become pregnant due to total hysterectomy    Patient was queried about side effects from isotretinoin. She specifically denied mood changes, thoughts of wanting to hurt self or others, severe headaches or vision changes. She also denied any other side effects aside from dryness and photosensitivity.     MEDICAL PROBLEMS:  Patient Active Problem List    Diagnosis Date Noted    Hallux abducto valgus, right     Pain in right foot     Polyuria 05/11/2021    Urinary frequency 05/11/2021    Gastroesophageal reflux disease with esophagitis 12/08/2020    Chronic bilateral low back pain without sciatica 12/08/2020    Gingivitis 12/08/2020    Bulimia nervosa, purging type 05/26/2020    Ear fullness, right 06/19/2018    High cholesterol 04/19/2018    Constipation 04/19/2018    Viral URI 08/22/2017    Medication refill 08/23/2016    Seasonal allergies 08/23/2016    Acne comedone 08/23/2016    Bunion 08/23/2016    Orthostatic hypotension 03/09/2015    COPD (chronic obstructive pulmonary disease) ([facility] 08/07/2014    H/O chronic respiratory failure 07/03/2014    GERD (gastroesophageal reflux disease) 04/30/2014    Empyema ([facility] [address] 02/19/2014    Hypoxia 02/13/2014    Dental caries 09/17/2013    Smoking 09/17/2013    Vargas's esophagus without dysplasia 05/10/2012    Acne 05/10/2012       CURRENT MEDICATIONS:   Current Outpatient Medications   Medication Sig Dispense Refill    ISOtretinoin (ACCUTANE) 30 MG chemo capsule Take 1 capsule by mouth daily 30 capsule 0    Fluticasone furoate-vilanterol (BREO ELLIPTA) 200-25 MCG/INH AEPB inhaler Inhale 1 puff into the lungs daily 1 each 11    albuterol sulfate HFA (VENTOLIN HFA) 108 (90 Base) MCG/ACT inhaler Inhale 2 puffs into the lungs every 6 hours as needed for Wheezing or Shortness of Breath 1 each 11    fluticasone (FLONASE) 50 MCG/ACT nasal spray 1 spray by Each Nostril route daily 32 g 1    ARNUITY ELLIPTA 200 MCG/ACT AEPB inhale 1 puff by mouth and INTO THE LUNGS once daily 1 each 4    INCRUSE ELLIPTA 62.5 MCG/INH AEPB inhale 1 puff by mouth and INTO THE LUNGS once daily 1 each 4    ondansetron (ZOFRAN) 4 MG tablet Take 1 tablet by mouth every 8 hours as needed for Nausea 20 tablet 0    atorvastatin (LIPITOR) 20 MG tablet Take 1 tablet by mouth daily 120 tablet 3    ibuprofen (ADVIL;MOTRIN) 800 MG tablet Take 1 tablet by mouth 2 times daily as needed for Pain 60 tablet 0    senna-docusate (RA P COL-RITE) 8.6-50 MG per tablet take 2 tablets by mouth once daily if needed for constipation 30 tablet 3    tazarotene (AVAGE) 0.1 % cream Apply topically nightly.  30 g 5    diphenhydrAMINE (BENADRYL) 25 MG capsule Take 2 capsules by mouth every 6 hours as needed for Itching 30 capsule 0    famotidine (PEPCID) 20 MG tablet Take 1 tablet by mouth 2 times daily 10 tablet 0    tiZANidine (ZANAFLEX) 4 MG tablet take 1 tablet by mouth three times a day 30 tablet 0    EPINEPHrine (EPIPEN 2-CRUZ) 0.3 MG/0.3ML SOAJ injection Use as directed for allergic reaction 1 each 2    Cholecalciferol (VITAMIN D3) 5000 units TABS Take 1 tablet by mouth daily      ferrous sulfate 325 (65 FE) MG tablet take 1 tablet by mouth once daily WITH BREAKFAST 30 tablet 3    TRINTELLIX 20 MG TABS tablet 30 mg   0    omeprazole (PRILOSEC) 40 MG delayed release capsule Take 1 capsule by mouth daily 60 capsule 3    aspirin 325 MG EC tablet Take 1 tablet by mouth daily 42 tablet 0     No current facility-administered medications for this visit. ALLERGIES:   Allergies   Allergen Reactions    Sesame [Oleum Sesami] Anaphylaxis     Sesame seeds    Codeine Nausea And Vomiting       SOCIAL HISTORY:  Social History     Tobacco Use    Smoking status: Former Smoker     Packs/day: 1.00     Years: 34.00     Pack years: 34.00     Types: Cigarettes     Start date: 1977     Quit date: 2014     Years since quittin.2    Smokeless tobacco: Never Used   Substance Use Topics    Alcohol use: Not Currently     Comment: quit 2214       Pertinent ROS:  Review of Systems  Skin: Denies any new changing, growing or bleeding lesions or rashes except as described in the HPI   Constitutional: Denies fevers, chills, and malaise. PHYSICAL EXAM:   /78   Pulse 79   Temp 97 °F (36.1 °C) (Temporal)   Ht 5' 7\" (1.702 m)   Wt 154 lb (69.9 kg)   SpO2 98%   Breastfeeding No   BMI 24.12 kg/m²     The patient is generally well appearing, well nourished, alert and conversational. Affect is normal.    Cutaneous Exam:  Physical Exam  Face and neck only was examined. Facial covering was removed during examination. Diagnoses/exam findings/medical history pertinent to this visit are listed below:    Assessment:   Diagnosis Orders   1. Acne vulgaris  Hepatic Function Panel    Lipid, Fasting        Plan:  1.  Acne vulgaris  - chronic illness, responding to treatment but not yet at goal  - Hepatic Function Panel; Standing  - Lipid, Fasting; Standing  Isotretinoin Continuation  - Isotretinoin start date: 2022  - Months of completed treatment: 1  - Current isotretinoin dose: 30 mg/day  - Anticipated isotretinoin dose for upcoming month: 60 mg/kg  - Cumulative isotretinoin dose: 900 mg  - Goal isotretinoin dosage: 9900 mg  (66 kg * 150 mg/kg)  - Pt had total hysterectomy  - Patient counseled to immediately notify provider of any concerning side effects  - Patient counseled to not share the medication with anyone nor donate blood while on isotretinoin       - Check baseline AST, ALT, and Triglycerides, then after 1 month of therapy, then repeat only if increasing dose or if abnormal    RTC 1M    Future Appointments   Date Time Provider Zane Aguilerai   5/18/2022 10:30 AM SCHEDULE, [facility] RESP[facility]AT[facility] SPEC Resp Spec [email or web address]   6/14/2022 10:00 AM Barb Harris PA-C  derm [email or web address]   6/16/2022 10:15 AM MD Angelica Machuca FP [facility]   10/19/2022 11:00 AM [facility]V CT ROOM 1 [facility] CT SCAN [facility] Radiolog   10/25/2022  9:45 AM Milagros Briseno MD Resp Spec [email or web address]         There are no Patient Instructions on file for this visit.       Electronically signed by Barb Harris PA-C on 5/17/22 at 4:12 PM EDT

## 2022-05-18 ENCOUNTER — OFFICE VISIT (OUTPATIENT)
Dept: PULMONOLOGY | Age: 57
End: 2022-05-18
Payer: MEDICARE

## 2022-05-18 VITALS — HEIGHT: 67 IN | WEIGHT: 154 LBS | HEART RATE: 76 BPM | BODY MASS INDEX: 24.17 KG/M2 | OXYGEN SATURATION: 98 %

## 2022-05-18 DIAGNOSIS — J44.9 CHRONIC OBSTRUCTIVE PULMONARY DISEASE, UNSPECIFIED COPD TYPE (HCC): Primary | ICD-10-CM

## 2022-05-18 DIAGNOSIS — L70.0 ACNE VULGARIS: Primary | ICD-10-CM

## 2022-05-18 PROCEDURE — 94010 BREATHING CAPACITY TEST: CPT | Performed by: INTERNAL MEDICINE

## 2022-05-18 PROCEDURE — 94726 PLETHYSMOGRAPHY LUNG VOLUMES: CPT | Performed by: INTERNAL MEDICINE

## 2022-05-18 PROCEDURE — 94729 DIFFUSING CAPACITY: CPT | Performed by: INTERNAL MEDICINE

## 2022-05-18 RX ORDER — ISOTRETINOIN 30 MG/1
30 CAPSULE ORAL 2 TIMES DAILY
Qty: 60 CAPSULE | Refills: 0 | Status: SHIPPED | OUTPATIENT
Start: 2022-05-18 | End: 2022-06-15

## 2022-05-18 NOTE — PROGRESS NOTES
Good effort and understanding. Transcutaneous Hb 13.2.   Patient stated she is noticing an improve with her starting [facility]

## 2022-05-19 NOTE — PROGRESS NOTES
Pulmonary function test  Date of study 05/18/2022. Spirometry shows FVC is 2.9 to 89% predicted. FEV1 is 2.18 83% predicted both are within normal limit. FEV1 FVC ratio is consistent with mild obstructive ventilatory impairment. Lung volume shows residual volume is 4.68 to 237% predicted. Total lung capacity 7.52 141.9% predicted both consistent with severe airway trapping/hyperinflation. Diffusion capacity is 19.79 120% predicted which is within normal limit. Impression: This pulmonary function test is consistent with mild obstructive ventilatory impairment according to FEV1 FVC ratio. There is severe airway trapping/hyperinflation present suggestive of emphysema.   Diffusion capacity is normal.  Dictation completed

## 2022-06-14 ENCOUNTER — HOSPITAL ENCOUNTER (OUTPATIENT)
Age: 57
Setting detail: SPECIMEN
Discharge: HOME | End: 2022-06-14

## 2022-06-14 ENCOUNTER — OFFICE VISIT (OUTPATIENT)
Dept: DERMATOLOGY | Age: 57
End: 2022-06-14
Payer: MEDICARE

## 2022-06-14 VITALS
HEIGHT: 66 IN | SYSTOLIC BLOOD PRESSURE: 99 MMHG | BODY MASS INDEX: 22.68 KG/M2 | WEIGHT: 141.1 LBS | TEMPERATURE: 97.1 F | DIASTOLIC BLOOD PRESSURE: 70 MMHG | OXYGEN SATURATION: 97 % | HEART RATE: 70 BPM

## 2022-06-14 DIAGNOSIS — Z79.899 ON ISOTRETINOIN THERAPY: ICD-10-CM

## 2022-06-14 DIAGNOSIS — L71.9 ROSACEA: ICD-10-CM

## 2022-06-14 DIAGNOSIS — L70.0 ACNE VULGARIS: Primary | ICD-10-CM

## 2022-06-14 LAB
ALBUMIN SERPL-MCNC: 4.4 G/DL (ref 3.5–5.2)
ALBUMIN/GLOBULIN RATIO: 1.9 (ref 1–2.5)
ALP BLD-CCNC: 63 U/L (ref 35–104)
ALT SERPL-CCNC: 20 U/L (ref 5–33)
AST SERPL-CCNC: 24 U/L
BILIRUB SERPL-MCNC: 0.18 MG/DL (ref 0.3–1.2)
BILIRUBIN DIRECT: <0.08 MG/DL
BILIRUBIN, INDIRECT: ABNORMAL MG/DL (ref 0–1)
CHOLESTEROL, FASTING: 181 MG/DL
CHOLESTEROL/HDL RATIO: 2.9
HDLC SERPL-MCNC: 62 MG/DL
LDL CHOLESTEROL: 100 MG/DL (ref 0–130)
TOTAL PROTEIN: 6.7 G/DL (ref 6.4–8.3)
TRIGLYCERIDE, FASTING: 96 MG/DL

## 2022-06-14 PROCEDURE — G8420 CALC BMI NORM PARAMETERS: HCPCS | Performed by: PHYSICIAN ASSISTANT

## 2022-06-14 PROCEDURE — 99214 OFFICE O/P EST MOD 30 MIN: CPT | Performed by: PHYSICIAN ASSISTANT

## 2022-06-14 PROCEDURE — 1036F TOBACCO NON-USER: CPT | Performed by: PHYSICIAN ASSISTANT

## 2022-06-14 PROCEDURE — G8427 DOCREV CUR MEDS BY ELIG CLIN: HCPCS | Performed by: PHYSICIAN ASSISTANT

## 2022-06-14 PROCEDURE — 3017F COLORECTAL CA SCREEN DOC REV: CPT | Performed by: PHYSICIAN ASSISTANT

## 2022-06-14 RX ORDER — IVERMECTIN 10 MG/G
CREAM TOPICAL
Qty: 45 G | Refills: 1 | Status: SHIPPED | OUTPATIENT
Start: 2022-06-14 | End: 2022-06-15

## 2022-06-14 NOTE — PROGRESS NOTES
Dermatology Patient Note  Maude  [address]  [place] [address]  Dept: [phone]  Dept Fax: [phone]      VISITDATE: 6/14/2022   REFERRING PROVIDER: No ref. provider found      Sarah Oliveira is a 61 y.o. female  who presents today in the office for:    Acne (No side effects with accutane. She states that she had an outbreak this month. )      HISTORY OF PRESENT ILLNESS:  As above. Right check, 2 erythematous papules that are non-tender. Shelby Vazquez is on Isotretinoin. She was seen today for her monthly follow-up evaluation. Interim disease course: Acne on chin is improving    Patient was queried about side effects from isotretinoin. He specifically denied mood changes, thoughts of wanting to hurt self or others, severe headaches or vision changes. He also denied any other side effects.     MEDICAL PROBLEMS:  Patient Active Problem List    Diagnosis Date Noted    Hallux abducto valgus, right     Pain in right foot     Polyuria 05/11/2021    Urinary frequency 05/11/2021    Gastroesophageal reflux disease with esophagitis 12/08/2020    Chronic bilateral low back pain without sciatica 12/08/2020    Gingivitis 12/08/2020    Bulimia nervosa, purging type 05/26/2020    Ear fullness, right 06/19/2018    High cholesterol 04/19/2018    Constipation 04/19/2018    Viral URI 08/22/2017    Medication refill 08/23/2016    Seasonal allergies 08/23/2016    Acne comedone 08/23/2016    Bunion 08/23/2016    Orthostatic hypotension 03/09/2015    COPD (chronic obstructive pulmonary disease) ([facility] 08/07/2014    H/O chronic respiratory failure 07/03/2014    GERD (gastroesophageal reflux disease) 04/30/2014    Empyema ([facility] 02/19/2014    Hypoxia 02/13/2014    Dental caries 09/17/2013    Smoking 09/17/2013    Vargas's esophagus without dysplasia 05/10/2012    Acne 05/10/2012       CURRENT MEDICATIONS:   Current Outpatient Medications   Medication Sig Dispense Refill    Ivermectin 1 % CREA Apply a pea sized amount, to central face, nightly.  45 g 1    ISOtretinoin (ACCUTANE) 30 MG chemo capsule Take 1 capsule by mouth 2 times daily 60 capsule 0    Fluticasone furoate-vilanterol (BREO ELLIPTA) 200-25 MCG/INH AEPB inhaler Inhale 1 puff into the lungs daily 1 each 11    albuterol sulfate HFA (VENTOLIN HFA) 108 (90 Base) MCG/ACT inhaler Inhale 2 puffs into the lungs every 6 hours as needed for Wheezing or Shortness of Breath 1 each 11    fluticasone (FLONASE) 50 MCG/ACT nasal spray 1 spray by Each Nostril route daily 32 g 1    INCRUSE ELLIPTA 62.5 MCG/INH AEPB inhale 1 puff by mouth and INTO THE LUNGS once daily 1 each 4    ondansetron (ZOFRAN) 4 MG tablet Take 1 tablet by mouth every 8 hours as needed for Nausea 20 tablet 0    atorvastatin (LIPITOR) 20 MG tablet Take 1 tablet by mouth daily 120 tablet 3    omeprazole (PRILOSEC) 40 MG delayed release capsule Take 1 capsule by mouth daily 60 capsule 3    ibuprofen (ADVIL;MOTRIN) 800 MG tablet Take 1 tablet by mouth 2 times daily as needed for Pain 60 tablet 0    senna-docusate (RA P COL-RITE) 8.6-50 MG per tablet take 2 tablets by mouth once daily if needed for constipation 30 tablet 3    diphenhydrAMINE (BENADRYL) 25 MG capsule Take 2 capsules by mouth every 6 hours as needed for Itching 30 capsule 0    famotidine (PEPCID) 20 MG tablet Take 1 tablet by mouth 2 times daily 10 tablet 0    tiZANidine (ZANAFLEX) 4 MG tablet take 1 tablet by mouth three times a day 30 tablet 0    aspirin 325 MG EC tablet Take 1 tablet by mouth daily 42 tablet 0    EPINEPHrine (EPIPEN 2-CRUZ) 0.3 MG/0.3ML SOAJ injection Use as directed for allergic reaction 1 each 2    Cholecalciferol (VITAMIN D3) 5000 units TABS Take 1 tablet by mouth daily      ferrous sulfate 325 (65 FE) MG tablet take 1 tablet by mouth once daily WITH BREAKFAST 30 tablet 3    TRINTELLIX 20 MG TABS tablet 30 mg 0    tazarotene (AVAGE) 0.1 % cream Apply topically nightly. (Patient not taking: Reported on 2022) 30 g 5     No current facility-administered medications for this visit. ALLERGIES:   Allergies   Allergen Reactions    Sesame [Oleum Sesami] Anaphylaxis     Sesame seeds    Codeine Nausea And Vomiting       SOCIAL HISTORY:  Social History     Tobacco Use    Smoking status: Former Smoker     Packs/day: 1.00     Years: 34.00     Pack years: 34.00     Types: Cigarettes     Start date: 1977     Quit date: 2014     Years since quittin.2    Smokeless tobacco: Never Used   Substance Use Topics    Alcohol use: Not Currently     Comment: quit 2214       Pertinent ROS:  Review of Systems  Skin: Denies any new changing, growing or bleeding lesions or rashes except as described in the HPI   Constitutional: Denies fevers, chills, and malaise. PHYSICAL EXAM:   BP 99/70 (Site: Left Upper Arm, Position: Sitting, Cuff Size: Medium Adult)   Pulse 70   Temp 97.1 °F (36.2 °C) (Temporal)   Ht 5' 6\" (1.676 m)   Wt 141 lb 1.6 oz (64 kg)   SpO2 97%   BMI 22.77 kg/m²     The patient is generally well appearing, well nourished, alert and conversational. Affect is normal.    Cutaneous Exam:  Physical Exam  Face and neck only was examined. Facial covering was removed during examination. Diagnoses/exam findings/medical history pertinent to this visit are listed below:    Assessment:   Diagnosis Orders   1. Acne vulgaris     2. On isotretinoin therapy  Hepatic Function Panel    Lipid, Fasting   3. Rosacea  Ivermectin 1 % CREA        Plan:  1.  Acne vulgaris  Isotretinoin Continuation  - Isotretinoin start date: 22  - Months of completed treatment: 2  - Current isotretinoin dose: 60 mg/day  - Anticipated isotretinoin dose for upcoming month: 60 mg/kg  - Cumulative isotretinoin dose: 2700 mg  - Goal isotretinoin dosage: 9900 mg  (66 kg * 150 mg/kg)  - Pt had total hysterectomy  - Patient counseled to immediately notify provider of any concerning side effects  - Patient counseled to not share the medication with anyone nor donate blood while on isotretinoin       - Check baseline AST, ALT, and Triglycerides, then after 1 month of therapy, then repeat only if increasing dose or if abnormal  - Check urine pregnancy test today and at monthly follow-up. 2. On isotretinoin therapy  - Hepatic Function Panel; Future  - Lipid, Fasting; Future    3. Rosacea  - Ivermectin 1 % CREA; Apply a pea sized amount, to central face, nightly. Dispense: 45 g; Refill: 1      RTC 1M    Future Appointments   Date Time Provider Zane Schaefer   6/22/2022  2:30 PM Elizabeth Kelley MD [email or web address]   7/12/2022  9:00 AM Dorothea Weiss PA-C  derm [facility]   10/19/2022 11:00 AM [facility] CT ROOM 1 [facility] CT SCAN ST Radiolog   10/25/2022  9:45 AM Naveen Dee MD Resp Spec [facility]         There are no Patient Instructions on file for this visit.       Electronically signed by Dorothea Weiss PA-C on 6/14/22 at 11:00 AM EDT

## 2022-06-15 DIAGNOSIS — L71.9 ROSACEA: Primary | ICD-10-CM

## 2022-06-15 DIAGNOSIS — L70.0 ACNE VULGARIS: Primary | ICD-10-CM

## 2022-06-15 RX ORDER — ISOTRETINOIN 30 MG/1
30 CAPSULE ORAL 2 TIMES DAILY
Qty: 60 CAPSULE | Refills: 0 | Status: SHIPPED | OUTPATIENT
Start: 2022-06-15 | End: 2022-07-12

## 2022-06-29 DIAGNOSIS — L70.0 ACNE VULGARIS: ICD-10-CM

## 2022-06-29 RX ORDER — TAZAROTENE 1 MG/G
CREAM TOPICAL
Qty: 30 G | Refills: 5 | Status: SHIPPED | OUTPATIENT
Start: 2022-06-29

## 2022-07-08 RX ORDER — UMECLIDINIUM 62.5 UG/1
AEROSOL, POWDER ORAL
Qty: 1 EACH | Refills: 8 | Status: SHIPPED | OUTPATIENT
Start: 2022-07-08

## 2022-07-08 NOTE — TELEPHONE ENCOUNTER
LAST VISIT: 4/19/22  NEXT VISIT: 10/25/22    Per last dictation patient is on this medication. Please sign for refill if ok. Thank you.

## 2022-07-12 ENCOUNTER — TELEPHONE (OUTPATIENT)
Dept: DERMATOLOGY | Age: 57
End: 2022-07-12

## 2022-07-12 ENCOUNTER — OFFICE VISIT (OUTPATIENT)
Dept: DERMATOLOGY | Age: 57
End: 2022-07-12
Payer: MEDICARE

## 2022-07-12 VITALS
TEMPERATURE: 97 F | HEIGHT: 66 IN | OXYGEN SATURATION: 96 % | SYSTOLIC BLOOD PRESSURE: 116 MMHG | HEART RATE: 77 BPM | DIASTOLIC BLOOD PRESSURE: 78 MMHG | BODY MASS INDEX: 22.47 KG/M2 | WEIGHT: 139.8 LBS

## 2022-07-12 DIAGNOSIS — L71.9 ROSACEA: ICD-10-CM

## 2022-07-12 DIAGNOSIS — L70.0 ACNE VULGARIS: Primary | ICD-10-CM

## 2022-07-12 PROCEDURE — 3017F COLORECTAL CA SCREEN DOC REV: CPT | Performed by: PHYSICIAN ASSISTANT

## 2022-07-12 PROCEDURE — 1036F TOBACCO NON-USER: CPT | Performed by: PHYSICIAN ASSISTANT

## 2022-07-12 PROCEDURE — G8420 CALC BMI NORM PARAMETERS: HCPCS | Performed by: PHYSICIAN ASSISTANT

## 2022-07-12 PROCEDURE — 99214 OFFICE O/P EST MOD 30 MIN: CPT | Performed by: PHYSICIAN ASSISTANT

## 2022-07-12 PROCEDURE — G8427 DOCREV CUR MEDS BY ELIG CLIN: HCPCS | Performed by: PHYSICIAN ASSISTANT

## 2022-07-12 RX ORDER — IVERMECTIN 10 MG/G
CREAM TOPICAL
Qty: 45 G | Refills: 2 | Status: SHIPPED | OUTPATIENT
Start: 2022-07-12 | End: 2022-07-13 | Stop reason: SDUPTHER

## 2022-07-12 RX ORDER — ISOTRETINOIN 30 MG/1
30 CAPSULE ORAL 2 TIMES DAILY
Qty: 60 CAPSULE | Refills: 0 | Status: SHIPPED | OUTPATIENT
Start: 2022-07-12 | End: 2022-08-11

## 2022-07-12 NOTE — TELEPHONE ENCOUNTER
Pt states she called the pharmacy and was not able to  her Accutane. Pharmacy was contacted and prescription was filled and able to be picked up.

## 2022-07-12 NOTE — PROGRESS NOTES
Dermatology Patient Note  Maude  [address]  [place] [address]  Dept: [phone]  Dept Fax: [phone]      VISITDATE: 7/12/2022   REFERRING PROVIDER: No ref. provider found      Rachele Tucker is a 61 y.o. female  who presents today in the office for:    Follow-up (pt states her face has cleared up. she has some breaking  out but not much. pt states she stopped taking the metrocream )      HISTORY OF PRESENT ILLNESS:  As above. Metrocream caused severe irritation to skin. Acne is better, but still getting occasional non-tener bums on central face. Tender lesions on chin have improved. Jacqueline Yanez is on Isotretinoin. She is seen today for her monthly follow-up evaluation. Interim disease course: Acne is improving    Patient was queried about side effects from isotretinoin. She specifically denied mood changes, thoughts of wanting to hurt self or others, severe headaches or vision changes. She also denied any other side effects aside from dryness.     MEDICAL PROBLEMS:  Patient Active Problem List    Diagnosis Date Noted    Hallux abducto valgus, right     Pain in right foot     Polyuria 05/11/2021    Urinary frequency 05/11/2021    Gastroesophageal reflux disease with esophagitis 12/08/2020    Chronic bilateral low back pain without sciatica 12/08/2020    Gingivitis 12/08/2020    Bulimia nervosa, purging type 05/26/2020    Ear fullness, right 06/19/2018    High cholesterol 04/19/2018    Constipation 04/19/2018    Viral URI 08/22/2017    Medication refill 08/23/2016    Seasonal allergies 08/23/2016    Acne comedone 08/23/2016    Bunion 08/23/2016    Orthostatic hypotension 03/09/2015    COPD (chronic obstructive pulmonary disease) ([facility] 08/07/2014    H/O chronic respiratory failure 07/03/2014    GERD (gastroesophageal reflux disease) 04/30/2014    Empyema ([facility] 02/19/2014    Hypoxia 02/13/2014    Dental caries 09/17/2013    Smoking 09/17/2013    Vargas's esophagus without dysplasia 05/10/2012    Acne 05/10/2012       CURRENT MEDICATIONS:   Current Outpatient Medications   Medication Sig Dispense Refill    ISOtretinoin (ACCUTANE) 30 MG chemo capsule Take 1 capsule by mouth 2 times daily 60 capsule 0    Ivermectin 1 % CREA Apply a pea sized amount, to central face and chin, nightly 45 g 2    INCRUSE ELLIPTA 62.5 MCG/INH AEPB inhale 1 puff by mouth and INTO THE LUNGS once daily 1 each 8    tazarotene (AVAGE) 0.1 % cream apply to affected area topically every evening 30 g 5    Fluticasone furoate-vilanterol (BREO ELLIPTA) 200-25 MCG/INH AEPB inhaler Inhale 1 puff into the lungs daily 1 each 11    albuterol sulfate HFA (VENTOLIN HFA) 108 (90 Base) MCG/ACT inhaler Inhale 2 puffs into the lungs every 6 hours as needed for Wheezing or Shortness of Breath 1 each 11    fluticasone (FLONASE) 50 MCG/ACT nasal spray 1 spray by Each Nostril route daily 32 g 1    ondansetron (ZOFRAN) 4 MG tablet Take 1 tablet by mouth every 8 hours as needed for Nausea 20 tablet 0    atorvastatin (LIPITOR) 20 MG tablet Take 1 tablet by mouth daily 120 tablet 3    ibuprofen (ADVIL;MOTRIN) 800 MG tablet Take 1 tablet by mouth 2 times daily as needed for Pain 60 tablet 0    senna-docusate (RA P COL-RITE) 8.6-50 MG per tablet take 2 tablets by mouth once daily if needed for constipation 30 tablet 3    diphenhydrAMINE (BENADRYL) 25 MG capsule Take 2 capsules by mouth every 6 hours as needed for Itching 30 capsule 0    famotidine (PEPCID) 20 MG tablet Take 1 tablet by mouth 2 times daily 10 tablet 0    tiZANidine (ZANAFLEX) 4 MG tablet take 1 tablet by mouth three times a day 30 tablet 0    EPINEPHrine (EPIPEN 2-CRUZ) 0.3 MG/0.3ML SOAJ injection Use as directed for allergic reaction 1 each 2    ferrous sulfate 325 (65 FE) MG tablet take 1 tablet by mouth once daily WITH BREAKFAST 30 tablet 3    TRINTELLIX 20 MG TABS tablet 30 mg   0    omeprazole (PRILOSEC) 40 MG delayed release capsule Take 1 capsule by mouth daily 60 capsule 3    aspirin 325 MG EC tablet Take 1 tablet by mouth daily 42 tablet 0     No current facility-administered medications for this visit. ALLERGIES:   Allergies   Allergen Reactions    Sesame [Oleum Sesami] Anaphylaxis     Sesame seeds    Codeine Nausea And Vomiting       SOCIAL HISTORY:  Social History     Tobacco Use    Smoking status: Former Smoker     Packs/day: 1.00     Years: 34.00     Pack years: 34.00     Types: Cigarettes     Start date: 1977     Quit date: 2014     Years since quittin.3    Smokeless tobacco: Never Used   Substance Use Topics    Alcohol use: Not Currently     Comment: quit 2214       Pertinent ROS:  Review of Systems  Skin: Denies any new changing, growing or bleeding lesions or rashes except as described in the HPI   Constitutional: Denies fevers, chills, and malaise. PHYSICAL EXAM:   /78   Pulse 77   Temp 97 °F (36.1 °C) (Temporal)   Ht 5' 6\" (1.676 m)   Wt 139 lb 12.8 oz (63.4 kg)   SpO2 96%   Breastfeeding No   BMI 22.56 kg/m²     The patient is generally well appearing, well nourished, alert and conversational. Affect is normal.    Cutaneous Exam:  Physical Exam  Face and neck only was examined. Facial covering was removed during examination. Diagnoses/exam findings/medical history pertinent to this visit are listed below:    Assessment:   Diagnosis Orders   1. Acne vulgaris  ISOtretinoin (ACCUTANE) 30 MG chemo capsule   2. Rosacea  Ivermectin 1 % CREA        Plan:  1.  Acne vulgaris  Isotretinoin Continuation  - Isotretinoin start date: 22  - Months of completed treatment: 3  - Current isotretinoin dose: 60 mg/day  - Anticipated isotretinoin dose for upcoming month: 60 mg/kg  - Cumulative isotretinoin dose: 4500 mg  - Goal isotretinoin dosage: 9900 mg  (66 kg * 150 mg/kg)  - 2 forms of contraception include: Postmenopausal  - Patient counseled to immediately notify provider of any concerning side effects  - Patient counseled to not share the medication with anyone nor donate blood while on isotretinoin       - Check baseline AST, ALT, and Triglycerides, then after 1 month of therapy, then repeat only if increasing dose or if abnormal  - Check urine pregnancy test today and at monthly follow-up.  - ISOtretinoin (ACCUTANE) 30 MG chemo capsule; Take 1 capsule by mouth 2 times daily  Dispense: 60 capsule; Refill: 0    2. Rosacea  - Ivermectin 1 % CREA; Apply a pea sized amount, to central face and chin, nightly  Dispense: 45 g; Refill: 2      RTC 1M    Future Appointments   Date Time Provider Zane Mckeonisti   7/26/2022  1:30 PM Champ Garner MD [facility]   8/11/2022  1:30 PM John Wagner PA-C  derm [facility]   10/19/2022 11:00 AM ST CT ROOM 1 [facility] CT SCAN [facility] Radiolog   10/25/2022  9:45 AM Danita Heart MD Resp Spec [facility]         There are no Patient Instructions on file for this visit.       Electronically signed by John Wagner PA-C on 7/12/22 at 11:26 AM EDT

## 2022-07-13 ENCOUNTER — TELEPHONE (OUTPATIENT)
Dept: DERMATOLOGY | Age: 57
End: 2022-07-13

## 2022-07-13 DIAGNOSIS — L71.9 ROSACEA: Primary | ICD-10-CM

## 2022-07-13 RX ORDER — IVERMECTIN 10 MG/G
CREAM TOPICAL
Qty: 45 G | Refills: 2 | Status: SHIPPED | OUTPATIENT
Start: 2022-07-13

## 2022-07-13 NOTE — TELEPHONE ENCOUNTER
Sent to [address] Pt will likely have to pay out of pocket, but this will be the cheapest price. Please advise her that they will be contacting her from a 330 area code.

## 2022-07-13 NOTE — TELEPHONE ENCOUNTER
Insurance denied ivermectin, they want the patient to try and fail Finacea form or Azelaic acid OR tell them why the covered drugs are not appropriate for this patient

## 2022-07-14 NOTE — TELEPHONE ENCOUNTER
Left detailed message on pt VM letting her know that we sent medication to [facility] and she will likely have to pay out of pocket but it will be the cheapest and they will be contacting her

## 2022-07-26 ENCOUNTER — OFFICE VISIT (OUTPATIENT)
Dept: FAMILY MEDICINE CLINIC | Age: 57
End: 2022-07-26
Payer: MEDICARE

## 2022-07-26 VITALS
SYSTOLIC BLOOD PRESSURE: 108 MMHG | HEART RATE: 70 BPM | BODY MASS INDEX: 22.31 KG/M2 | DIASTOLIC BLOOD PRESSURE: 81 MMHG | TEMPERATURE: 92.3 F | WEIGHT: 138.8 LBS | HEIGHT: 66 IN

## 2022-07-26 DIAGNOSIS — H53.9 VISION CHANGES: ICD-10-CM

## 2022-07-26 DIAGNOSIS — T78.2XXS ANAPHYLAXIS, SEQUELA: ICD-10-CM

## 2022-07-26 DIAGNOSIS — K22.70 BARRETT'S ESOPHAGUS WITHOUT DYSPLASIA: ICD-10-CM

## 2022-07-26 DIAGNOSIS — Z12.31 ENCOUNTER FOR SCREENING MAMMOGRAM FOR MALIGNANT NEOPLASM OF BREAST: ICD-10-CM

## 2022-07-26 DIAGNOSIS — Z76.0 MEDICATION REFILL: Primary | ICD-10-CM

## 2022-07-26 DIAGNOSIS — E78.00 HIGH CHOLESTEROL: ICD-10-CM

## 2022-07-26 PROCEDURE — G8427 DOCREV CUR MEDS BY ELIG CLIN: HCPCS | Performed by: STUDENT IN AN ORGANIZED HEALTH CARE EDUCATION/TRAINING PROGRAM

## 2022-07-26 PROCEDURE — 1036F TOBACCO NON-USER: CPT | Performed by: STUDENT IN AN ORGANIZED HEALTH CARE EDUCATION/TRAINING PROGRAM

## 2022-07-26 PROCEDURE — 3017F COLORECTAL CA SCREEN DOC REV: CPT | Performed by: STUDENT IN AN ORGANIZED HEALTH CARE EDUCATION/TRAINING PROGRAM

## 2022-07-26 PROCEDURE — 99213 OFFICE O/P EST LOW 20 MIN: CPT | Performed by: STUDENT IN AN ORGANIZED HEALTH CARE EDUCATION/TRAINING PROGRAM

## 2022-07-26 PROCEDURE — G8420 CALC BMI NORM PARAMETERS: HCPCS | Performed by: STUDENT IN AN ORGANIZED HEALTH CARE EDUCATION/TRAINING PROGRAM

## 2022-07-26 RX ORDER — ATORVASTATIN CALCIUM 20 MG/1
20 TABLET, FILM COATED ORAL DAILY
Qty: 120 TABLET | Refills: 3 | Status: SHIPPED | OUTPATIENT
Start: 2022-07-26

## 2022-07-26 RX ORDER — OMEPRAZOLE 40 MG/1
40 CAPSULE, DELAYED RELEASE ORAL DAILY
Qty: 60 CAPSULE | Refills: 3 | Status: SHIPPED | OUTPATIENT
Start: 2022-07-26 | End: 2022-11-23

## 2022-07-26 RX ORDER — EPINEPHRINE 0.3 MG/.3ML
INJECTION SUBCUTANEOUS
Qty: 1 EACH | Refills: 2 | Status: SHIPPED | OUTPATIENT
Start: 2022-07-26

## 2022-07-26 SDOH — ECONOMIC STABILITY: FOOD INSECURITY: WITHIN THE PAST 12 MONTHS, THE FOOD YOU BOUGHT JUST DIDN'T LAST AND YOU DIDN'T HAVE MONEY TO GET MORE.: NEVER TRUE

## 2022-07-26 SDOH — ECONOMIC STABILITY: FOOD INSECURITY: WITHIN THE PAST 12 MONTHS, YOU WORRIED THAT YOUR FOOD WOULD RUN OUT BEFORE YOU GOT MONEY TO BUY MORE.: NEVER TRUE

## 2022-07-26 ASSESSMENT — PATIENT HEALTH QUESTIONNAIRE - PHQ9
1. LITTLE INTEREST OR PLEASURE IN DOING THINGS: 0
SUM OF ALL RESPONSES TO PHQ QUESTIONS 1-9: 0
2. FEELING DOWN, DEPRESSED OR HOPELESS: 0
SUM OF ALL RESPONSES TO PHQ QUESTIONS 1-9: 0
SUM OF ALL RESPONSES TO PHQ9 QUESTIONS 1 & 2: 0

## 2022-07-26 ASSESSMENT — ENCOUNTER SYMPTOMS
COLOR CHANGE: 0
SINUS PRESSURE: 0
VOMITING: 0
SINUS PAIN: 0
DIARRHEA: 0
ABDOMINAL PAIN: 0
CONSTIPATION: 0
NAUSEA: 0
COUGH: 0
SHORTNESS OF BREATH: 0
WHEEZING: 0
BLOOD IN STOOL: 0
BACK PAIN: 0

## 2022-07-26 ASSESSMENT — SOCIAL DETERMINANTS OF HEALTH (SDOH): HOW HARD IS IT FOR YOU TO PAY FOR THE VERY BASICS LIKE FOOD, HOUSING, MEDICAL CARE, AND HEATING?: NOT HARD AT ALL

## 2022-07-26 NOTE — PROGRESS NOTES
Subjective:    Bon Hensley is a 61 y.o. female with  has a past medical history of Acne, Vargas's esophagus, Bipolar disorder ([address] Bunion of great toe of left foot, Bunion of great toe of right foot, Chronic obstructive pulmonary disease (COPD) ([address] Depression, Dyspnea on exertion, Eating disorder, Empyema ([address] Fatigue due to depression, GERD (gastroesophageal reflux disease), H/O chronic respiratory failure, Moderate persistent asthma, Pneumonia, Seasonal allergies, Smoking, Substance abuse ([address] and Wellness examination. Presented to the office today for:  Chief Complaint   Patient presents with    Check-Up    Referral - General     Eye doctor        HPI  This is a 54-year-old female with a history of COPD, Vargas's esophagus, left lung cortication status post VATS procedure in 2014 came in today for medication refill. Patient recently had a PFTs, results were reviewed. Patient is established with pulmonologist and is currently on Breo and Incruse per pulmonology. Patient is asymptomatic at this time, patient is only due for her mammogram last one was done 2017 which was unremarkable. Review of Systems   Constitutional:  Negative for chills and fever. HENT:  Negative for congestion, sinus pressure and sinus pain. Respiratory:  Negative for cough, shortness of breath and wheezing. Cardiovascular:  Negative for chest pain, palpitations and leg swelling. Gastrointestinal:  Negative for abdominal pain, blood in stool, constipation, diarrhea, nausea and vomiting. Genitourinary:  Negative for difficulty urinating, flank pain and hematuria. Musculoskeletal:  Negative for arthralgias, back pain and myalgias. Skin:  Negative for color change and wound. Neurological:  Negative for dizziness, light-headedness and headaches. Psychiatric/Behavioral:  Negative for agitation and confusion. ROS negative except what mentioned in HPI.         The patient has a   Family History Problem Relation Age of Onset    Asthma Mother     Stroke Father     Heart Attack Father     Cancer Maternal Grandmother         skin cancer    Diabetes Maternal Uncle        Objective:    /81 (Site: Left Upper Arm, Position: Sitting, Cuff Size: Medium Adult) Comment: machine  Pulse 70   Temp (!) 92.3 °F (33.5 °C) (Temporal)   Ht 5' 5.98\" (1.676 m)   Wt 138 lb 12.8 oz (63 kg)   BMI 22.41 kg/m²    BP Readings from Last 3 Encounters:   07/26/22 108/81   07/12/22 116/78   06/14/22 99/70       Physical Exam  Vitals and nursing note reviewed. Constitutional:       Appearance: Normal appearance. HENT:      Mouth/Throat:      Mouth: Mucous membranes are moist.   Eyes:      Conjunctiva/sclera: Conjunctivae normal.   Cardiovascular:      Rate and Rhythm: Normal rate and regular rhythm. Pulmonary:      Effort: Pulmonary effort is normal.      Breath sounds: Normal breath sounds. Abdominal:      General: Bowel sounds are normal. There is no distension. Palpations: Abdomen is soft. There is no mass. Tenderness: There is no abdominal tenderness. There is no guarding. Musculoskeletal:      Right lower leg: No edema. Left lower leg: No edema. Neurological:      General: No focal deficit present. Mental Status: She is alert and oriented to person, place, and time.    Psychiatric:         Mood and Affect: Mood normal.         Behavior: Behavior normal.       Lab Results   Component Value Date    WBC 6.0 10/21/2020    HGB 13.0 10/21/2020    HCT 39.4 10/21/2020     10/21/2020    CHOL 181 02/11/2021    TRIG 160 (H) 04/20/2022    HDL 62 06/14/2022    ALT 20 06/14/2022    AST 24 06/14/2022     05/12/2021    K 4.0 05/12/2021     05/12/2021    CREATININE 0.63 12/13/2021    BUN 12 12/13/2021    CO2 24 05/12/2021    TSH 0.43 02/18/2020    INR 1.2 02/21/2014    LABA1C 5.0 05/11/2021     Lab Results   Component Value Date    CALCIUM 9.6 05/12/2021     Lab Results   Component Value Date    LDLCHOLESTEROL 100 06/14/2022       Examination including this and mentioned above in HPI. Assessment and Plan:    1. COPD:   -Controlled, continue rescue and maintenance inhaler. -PFT reviewed. -CT lung screening ordered. -CT done on 10/19/2021 shows mild emphysematous changes. - EPINEPHrine (EPIPEN 2-CRUZ) 0.3 MG/0.3ML SOAJ injection; Use as directed for allergic reaction  Dispense: 1 each; Refill: 2    2. Vargas's esophagus without dysplasia  -EGD done in 2020, counseled patient to reestablish care with gastroenterologist.   -We will continue PPI still the plan patient see gastroenterologist.  Biopsy is negative for metaplasia. - omeprazole (PRILOSEC) 40 MG delayed release capsule; Take 1 capsule by mouth in the morning. Dispense: 60 capsule; Refill: 3    3. High cholesterol/ASCVD  The 10-year ASCVD risk score (Varinder Vernon, et al., 2013) is: 1.6%    Values used to calculate the score:      Age: 61 years      Sex: Female      Is Non- : No      Diabetic: No      Tobacco smoker: No      Systolic Blood Pressure: 159 mmHg      Is BP treated: No      HDL Cholesterol: 62 mg/dL      Total Cholesterol: 181 mg/dL  - atorvastatin (LIPITOR) 20 MG tablet; Take 1 tablet by mouth in the morning. Dispense: 120 tablet; Refill: 3    4. Encounter for screening mammogram for malignant neoplasm of breast  -Mammogram in 2017 was unremarkable. Repeat ordered today  - [facility] DIGITAL SCREEN W OR WO CAD BILATERAL; Future    5. Last colonoscopy was done in 2016 per Dr. Arthur Pel will not patient need a repeat in 2021. Chart review also mentioned colonoscopy in 2016, will discuss with the patient on the next encounter.             Requested Prescriptions     Signed Prescriptions Disp Refills    EPINEPHrine (EPIPEN 2-CRUZ) 0.3 MG/0.3ML SOAJ injection 1 each 2     Sig: Use as directed for allergic reaction    omeprazole (PRILOSEC) 40 MG delayed release capsule 60 capsule 3     Sig: Take 1 capsule by mouth in the morning. atorvastatin (LIPITOR) 20 MG tablet 120 tablet 3     Sig: Take 1 tablet by mouth in the morning. Medications Discontinued During This Encounter   Medication Reason    aspirin 325 MG EC tablet LIST CLEANUP    ondansetron (ZOFRAN) 4 MG tablet LIST CLEANUP    tiZANidine (ZANAFLEX) 4 MG tablet LIST CLEANUP    famotidine (PEPCID) 20 MG tablet LIST CLEANUP    EPINEPHrine (EPIPEN 2-CRUZ) 0.3 MG/0.3ML SOAJ injection REORDER    atorvastatin (LIPITOR) 20 MG tablet REORDER    omeprazole (PRILOSEC) 40 MG delayed release capsule REORDER       Laurel received counseling on the following healthy behaviors: nutrition, exercise and medication adherence      Patient was counseled about importance of taking medication which were prescribed today and also which he is already using during today conversation. Discussed use,benefit, and side effects of prescribed medications. Barriers to medication compliance addressed. Patient was also counseled that lifestyle changes including diet, smoking and use of less alcohol will benefit their health in long term. All the question asked by the patient were answered in non-medical terms and to be best of writer knowledge. Patient understands and agree to the plan. Teach back method was used while having the conversation. All patient questions answered. Pt voiced understanding. Return in about 2 months (around 9/26/2022) for H pylori testing if PPI use > 8. Disclaimer: Some oral of this note was transcribed using voice-recognition software. This may cause typographical errors occasionally, please review it with the context of the note. Although all effort is made to fix these errors, please do not hesitate to contact our office if there [place] concern with the understanding of this note.

## 2022-07-26 NOTE — PROGRESS NOTES
Visit Information    Have you changed or started any medications since your last visit including any over-the-counter medicines, vitamins, or herbal medicines? no   Have you stopped taking any of your medications? Is so, why? -  no  Are you having any side effects from any of your medications? - no    Have you seen any other physician or provider since your last visit?  no   Have you had any other diagnostic tests since your last visit?  no   Have you been seen in the emergency room and/or had an admission in a hospital since we last saw you?  no   Have you had your routine dental cleaning in the past 6 months?  yes -      Do you have an active MyChart account? If no, what is the barrier?   Yes    Patient Care Team:  Kt Lechuga MD as PCP - General (Family Medicine)  Ramos Vivas MD as Surgeon (Cardiothoracic Surgery)  Serafin Marques MD as Consulting Physician (Pulmonology)  [address] [place] [address] Road History Review  Past Medical, Family, and Social History reviewed and does not contribute to the patient presenting condition    Health Maintenance   Topic Date Due    Breast cancer screen  07/25/2019    COVID-19 Vaccine (4 - Booster for Pfizer series) 05/22/2022    Flu vaccine (1) 09/01/2022    Low dose CT lung screening  10/18/2022    Depression Screen  12/13/2022    Annual Wellness Visit (AWV)  12/14/2022    Lipids  06/14/2023    DTaP/Tdap/Td vaccine (2 - Td or Tdap) 04/30/2024    Colorectal Cancer Screen  07/14/2026    Pneumococcal 0-64 years Vaccine (3 - PPSV23 or PCV20) 07/17/2029    Shingles vaccine  Completed    Hepatitis C screen  Completed    HIV screen  Completed    Hepatitis A vaccine  Aged Out    Hepatitis B vaccine  Aged Out    Hib vaccine  Aged Out    Meningococcal (ACWY) vaccine  Aged Out

## 2022-08-11 ENCOUNTER — OFFICE VISIT (OUTPATIENT)
Dept: DERMATOLOGY | Age: 57
End: 2022-08-11
Payer: MEDICARE

## 2022-08-11 VITALS
HEIGHT: 60 IN | SYSTOLIC BLOOD PRESSURE: 115 MMHG | HEART RATE: 78 BPM | BODY MASS INDEX: 27.17 KG/M2 | TEMPERATURE: 97.3 F | DIASTOLIC BLOOD PRESSURE: 78 MMHG | WEIGHT: 138.4 LBS | OXYGEN SATURATION: 95 %

## 2022-08-11 DIAGNOSIS — L71.9 ROSACEA: ICD-10-CM

## 2022-08-11 DIAGNOSIS — L70.0 ACNE VULGARIS: Primary | ICD-10-CM

## 2022-08-11 PROCEDURE — G8427 DOCREV CUR MEDS BY ELIG CLIN: HCPCS | Performed by: PHYSICIAN ASSISTANT

## 2022-08-11 PROCEDURE — G8419 CALC BMI OUT NRM PARAM NOF/U: HCPCS | Performed by: PHYSICIAN ASSISTANT

## 2022-08-11 PROCEDURE — 99214 OFFICE O/P EST MOD 30 MIN: CPT | Performed by: PHYSICIAN ASSISTANT

## 2022-08-11 PROCEDURE — 3017F COLORECTAL CA SCREEN DOC REV: CPT | Performed by: PHYSICIAN ASSISTANT

## 2022-08-11 PROCEDURE — 1036F TOBACCO NON-USER: CPT | Performed by: PHYSICIAN ASSISTANT

## 2022-08-11 RX ORDER — ISOTRETINOIN 30 MG/1
30 CAPSULE ORAL 2 TIMES DAILY
Qty: 60 CAPSULE | Refills: 0 | Status: SHIPPED | OUTPATIENT
Start: 2022-08-11 | End: 2022-09-09 | Stop reason: SDUPTHER

## 2022-08-11 NOTE — PROGRESS NOTES
Dermatology Patient Note  [address]  [place] [address]  Dept: [phone]  Dept Fax: [phone]      VISITDATE: 8/11/2022   REFERRING PROVIDER: No ref. provider found      Nicolas Morales is a 61 y.o. female  who presents today in the office for:    Follow-up and Acne (F/u Acne Pt states her acne isn't improving much )      HISTORY OF PRESENT ILLNESS:  As above. Pt notes that the tender lesions on her face have not recurred. She states that she still gets few non tender pustules, to central face. She is using isotretinoin and Soolantra. Mary Morel is on Isotretinoin. She is seen today for her monthly follow-up evaluation. Interim disease course: Acne is improving    Patient was queried about side effects from isotretinoin. She specifically denied mood changes, thoughts of wanting to hurt self or others, severe headaches or vision changes. She also denied any other side effects aside from dryness.      MEDICAL PROBLEMS:  Patient Active Problem List    Diagnosis Date Noted    Hallux abducto valgus, right     Pain in right foot     Polyuria 05/11/2021    Urinary frequency 05/11/2021    Gastroesophageal reflux disease with esophagitis 12/08/2020    Chronic bilateral low back pain without sciatica 12/08/2020    Gingivitis 12/08/2020    Bulimia nervosa, purging type 05/26/2020    Ear fullness, right 06/19/2018    High cholesterol 04/19/2018    Constipation 04/19/2018    Viral URI 08/22/2017    Medication refill 08/23/2016    Seasonal allergies 08/23/2016    Acne comedone 08/23/2016    Bunion 08/23/2016    Orthostatic hypotension 03/09/2015    COPD (chronic obstructive pulmonary disease) ([facility]) 08/07/2014    H/O chronic respiratory failure 07/03/2014    GERD (gastroesophageal reflux disease) 04/30/2014    Empyema ([facility]) 02/19/2014    Hypoxia 02/13/2014    Dental caries 09/17/2013    Smoking 09/17/2013    Vargas's esophagus without dysplasia 05/10/2012    Acne 05/10/2012       CURRENT MEDICATIONS:   Current Outpatient Medications   Medication Sig Dispense Refill    ISOtretinoin (ACCUTANE) 30 MG chemo capsule Take 1 capsule by mouth in the morning and 1 capsule before bedtime. 60 capsule 0    EPINEPHrine (EPIPEN 2-CRUZ) 0.3 MG/0.3ML SOAJ injection Use as directed for allergic reaction 1 each 2    omeprazole (PRILOSEC) 40 MG delayed release capsule Take 1 capsule by mouth in the morning. 60 capsule 3    atorvastatin (LIPITOR) 20 MG tablet Take 1 tablet by mouth in the morning. 120 tablet 3    Ivermectin 1 % CREA Apply a pea sized amount, to central face and chin, nightly 45 g 2    INCRUSE ELLIPTA 62.5 MCG/INH AEPB inhale 1 puff by mouth and INTO THE LUNGS once daily 1 each 8    tazarotene (AVAGE) 0.1 % cream apply to affected area topically every evening 30 g 5    Fluticasone furoate-vilanterol (BREO ELLIPTA) 200-25 MCG/INH AEPB inhaler Inhale 1 puff into the lungs daily 1 each 11    albuterol sulfate HFA (VENTOLIN HFA) 108 (90 Base) MCG/ACT inhaler Inhale 2 puffs into the lungs every 6 hours as needed for Wheezing or Shortness of Breath 1 each 11    fluticasone (FLONASE) 50 MCG/ACT nasal spray 1 spray by Each Nostril route daily 32 g 1    ibuprofen (ADVIL;MOTRIN) 800 MG tablet Take 1 tablet by mouth 2 times daily as needed for Pain 60 tablet 0    senna-docusate (RA P COL-RITE) 8.6-50 MG per tablet take 2 tablets by mouth once daily if needed for constipation 30 tablet 3    diphenhydrAMINE (BENADRYL) 25 MG capsule Take 2 capsules by mouth every 6 hours as needed for Itching 30 capsule 0    ferrous sulfate 325 (65 FE) MG tablet take 1 tablet by mouth once daily WITH BREAKFAST 30 tablet 3    TRINTELLIX 20 MG TABS tablet 30 mg   0     No current facility-administered medications for this visit.        ALLERGIES:   Allergies   Allergen Reactions    Sesame [Oleum Sesami] Anaphylaxis     Sesame seeds    Codeine Nausea And Vomiting SOCIAL HISTORY:  Social History     Tobacco Use    Smoking status: Former     Packs/day: 1.00     Years: 34.00     Pack years: 34.00     Types: Cigarettes     Start date: 1977     Quit date: 2014     Years since quittin.4    Smokeless tobacco: Never   Substance Use Topics    Alcohol use: Not Currently     Comment: quit 2214       Pertinent ROS:  Review of Systems  Skin: Denies any new changing, growing or bleeding lesions or rashes except as described in the HPI   Constitutional: Denies fevers, chills, and malaise. PHYSICAL EXAM:   /78   Pulse 78   Temp 97.3 °F (36.3 °C) (Temporal)   Ht 5' (1.524 m)   Wt 138 lb 6.4 oz (62.8 kg)   SpO2 95%   BMI 27.03 kg/m²     The patient is generally well appearing, well nourished, alert and conversational. Affect is normal.    Cutaneous Exam:  Physical Exam  Focused exam of face was performed    Facial covering was removed during examination. Diagnoses/exam findings/medical history pertinent to this visit are listed below:    Assessment:   Diagnosis Orders   1. Acne vulgaris  ISOtretinoin (ACCUTANE) 30 MG chemo capsule      2. Rosacea             Plan:  1.  Acne vulgaris  - chronic illness, responding to treatment but not yet at goal   Isotretinoin Continuation  - Isotretinoin start date: 22  - Months of completed treatment: 4  - Current isotretinoin dose: 60 mg/day  - Anticipated isotretinoin dose for upcoming month: 60 mg/kg  - Cumulative isotretinoin dose: 6300 mg  - Goal isotretinoin dosage: 9900 mg  (66 kg * 150 mg/kg)  - 2 forms of contraception include: Postmenopausal  - Patient counseled to immediately notify provider of any concerning side effects  - Patient counseled to not share the medication with anyone nor donate blood while on isotretinoin       - Check baseline AST, ALT, and Triglycerides, then after 1 month of therapy, then repeat only if increasing dose or if abnormal  - Check urine pregnancy test today and at monthly follow-up.  - ISOtretinoin (ACCUTANE) 30 MG chemo capsule; Take 1 capsule by mouth 2 times daily  Dispense: 60 capsule; Refill: 0     - ISOtretinoin (ACCUTANE) 30 MG chemo capsule; Take 1 capsule by mouth in the morning and 1 capsule before bedtime. Dispense: 60 capsule; Refill: 0    2. Rosacea  - chronic illness, responding to treatment but not yet at goal   - Continue Soolantra      RTC 1M    Future Appointments   Date Time Provider Zane Olive   9/9/2022 11:30 AM Estephania Vera PA-C  derm [facility]   10/19/2022 11:00 AM [facility]V CT ROOM 1 [facility] CT SCAN [facility] Radiolog   10/25/2022  9:45 AM Ludin Szymanski MD Resp Spec [facility]         There are no Patient Instructions on file for this visit.       Electronically signed by Estephania Vera PA-C on 8/11/22 at 2:09 PM EDT

## 2022-09-09 ENCOUNTER — OFFICE VISIT (OUTPATIENT)
Dept: DERMATOLOGY | Age: 57
End: 2022-09-09
Payer: MEDICARE

## 2022-09-09 VITALS
OXYGEN SATURATION: 96 % | DIASTOLIC BLOOD PRESSURE: 70 MMHG | SYSTOLIC BLOOD PRESSURE: 91 MMHG | HEART RATE: 71 BPM | TEMPERATURE: 89.6 F | HEIGHT: 66 IN | BODY MASS INDEX: 22.18 KG/M2 | WEIGHT: 138 LBS

## 2022-09-09 DIAGNOSIS — L71.9 ROSACEA: Primary | ICD-10-CM

## 2022-09-09 DIAGNOSIS — L70.0 ACNE VULGARIS: ICD-10-CM

## 2022-09-09 PROCEDURE — 99214 OFFICE O/P EST MOD 30 MIN: CPT | Performed by: PHYSICIAN ASSISTANT

## 2022-09-09 PROCEDURE — 1036F TOBACCO NON-USER: CPT | Performed by: PHYSICIAN ASSISTANT

## 2022-09-09 PROCEDURE — G8420 CALC BMI NORM PARAMETERS: HCPCS | Performed by: PHYSICIAN ASSISTANT

## 2022-09-09 PROCEDURE — G8427 DOCREV CUR MEDS BY ELIG CLIN: HCPCS | Performed by: PHYSICIAN ASSISTANT

## 2022-09-09 PROCEDURE — 3017F COLORECTAL CA SCREEN DOC REV: CPT | Performed by: PHYSICIAN ASSISTANT

## 2022-09-09 RX ORDER — ISOTRETINOIN 30 MG/1
30 CAPSULE ORAL 2 TIMES DAILY
Qty: 60 CAPSULE | Refills: 0 | Status: SHIPPED | OUTPATIENT
Start: 2022-09-09 | End: 2022-10-07 | Stop reason: SDUPTHER

## 2022-09-09 NOTE — PROGRESS NOTES
Dermatology Patient Note  [address]  [facility]  Dept: [phone]  Dept Fax: [phone]      VISITDATE: 9/9/2022   REFERRING PROVIDER: No ref. provider found      Maria Del Carmen Nix is a 61 y.o. female  who presents today in the office for:    Follow-up (Pt says she sees some improvement, pt states she started using her soolantra as well )      HISTORY OF PRESENT ILLNESS:  As above. Princess Smith is on Isotretinoin. She was seen today for her monthly follow-up evaluation. Interim disease course: Improving    Patient was queried about side effects from isotretinoin. She specifically denied mood changes, thoughts of wanting to hurt self or others, severe headaches or vision changes. She also denied any other side effects aside from dryness.      MEDICAL PROBLEMS:  Patient Active Problem List    Diagnosis Date Noted    Hallux abducto valgus, right     Pain in right foot     Polyuria 05/11/2021    Urinary frequency 05/11/2021    Gastroesophageal reflux disease with esophagitis 12/08/2020    Chronic bilateral low back pain without sciatica 12/08/2020    Gingivitis 12/08/2020    Bulimia nervosa, purging type 05/26/2020    Ear fullness, right 06/19/2018    High cholesterol 04/19/2018    Constipation 04/19/2018    Viral URI 08/22/2017    Medication refill 08/23/2016    Seasonal allergies 08/23/2016    Acne comedone 08/23/2016    Bunion 08/23/2016    Orthostatic hypotension 03/09/2015    COPD (chronic obstructive pulmonary disease) ([facility] 08/07/2014    H/O chronic respiratory failure 07/03/2014    GERD (gastroesophageal reflux disease) 04/30/2014    Empyema ([facility] 02/19/2014    Hypoxia 02/13/2014    Dental caries 09/17/2013    Smoking 09/17/2013    Avrgas's esophagus without dysplasia 05/10/2012    Acne 05/10/2012       CURRENT MEDICATIONS:   Current Outpatient Medications   Medication Sig Dispense Refill    ISOtretinoin (ACCUTANE) 30 MG chemo capsule Take 1 capsule by mouth 2 times daily 60 capsule 0    EPINEPHrine (EPIPEN 2-CRUZ) 0.3 MG/0.3ML SOAJ injection Use as directed for allergic reaction 1 each 2    omeprazole (PRILOSEC) 40 MG delayed release capsule Take 1 capsule by mouth in the morning. 60 capsule 3    atorvastatin (LIPITOR) 20 MG tablet Take 1 tablet by mouth in the morning. 120 tablet 3    Ivermectin 1 % CREA Apply a pea sized amount, to central face and chin, nightly 45 g 2    INCRUSE ELLIPTA 62.5 MCG/INH AEPB inhale 1 puff by mouth and INTO THE LUNGS once daily 1 each 8    tazarotene (AVAGE) 0.1 % cream apply to affected area topically every evening 30 g 5    Fluticasone furoate-vilanterol (BREO ELLIPTA) 200-25 MCG/INH AEPB inhaler Inhale 1 puff into the lungs daily 1 each 11    albuterol sulfate HFA (VENTOLIN HFA) 108 (90 Base) MCG/ACT inhaler Inhale 2 puffs into the lungs every 6 hours as needed for Wheezing or Shortness of Breath 1 each 11    fluticasone (FLONASE) 50 MCG/ACT nasal spray 1 spray by Each Nostril route daily 32 g 1    ibuprofen (ADVIL;MOTRIN) 800 MG tablet Take 1 tablet by mouth 2 times daily as needed for Pain 60 tablet 0    senna-docusate (RA P COL-RITE) 8.6-50 MG per tablet take 2 tablets by mouth once daily if needed for constipation 30 tablet 3    diphenhydrAMINE (BENADRYL) 25 MG capsule Take 2 capsules by mouth every 6 hours as needed for Itching 30 capsule 0    ferrous sulfate 325 (65 FE) MG tablet take 1 tablet by mouth once daily WITH BREAKFAST 30 tablet 3    TRINTELLIX 20 MG TABS tablet 30 mg   0     No current facility-administered medications for this visit.        ALLERGIES:   Allergies   Allergen Reactions    Sesame [Oleum Sesami] Anaphylaxis     Sesame seeds    Codeine Nausea And Vomiting       SOCIAL HISTORY:  Social History     Tobacco Use    Smoking status: Former     Packs/day: 1.00     Years: 34.00     Pack years: 34.00     Types: Cigarettes     Start date: 6/30/1977     Quit date: 2014     Years since quittin.5    Smokeless tobacco: Never   Substance Use Topics    Alcohol use: Not Currently     Comment: quit 2214       Pertinent ROS:  Review of Systems  Skin: Denies any new changing, growing or bleeding lesions or rashes except as described in the HPI   Constitutional: Denies fevers, chills, and malaise. PHYSICAL EXAM:   BP 91/70   Pulse 71   Temp (!) 89.6 °F (32 °C) (Skin)   Ht 5' 6\" (1.676 m)   Wt 138 lb (62.6 kg)   SpO2 96%   BMI 22.27 kg/m²     The patient is generally well appearing, well nourished, alert and conversational. Affect is normal.    Cutaneous Exam:  Physical Exam  Face and neck only was examined. Marked facial erythema noted    Facial covering was removed during examination. Diagnoses/exam findings/medical history pertinent to this visit are listed below:    Assessment:   Diagnosis Orders   1. Rosacea  External Referral To Dermatology      2. Acne vulgaris  ISOtretinoin (ACCUTANE) 30 MG chemo capsule           Plan:  1. Acne vulgaris  - chronic illness, responding to treatment but not yet at goal   Isotretinoin Continuation  - Isotretinoin start date: 22  - Months of completed treatment: 5  - Current isotretinoin dose: 60 mg/day  - Anticipated isotretinoin dose for upcoming month: 60 mg/kg - LAST MONTH OF Tx UPCOMING  - Cumulative isotretinoin dose: 8100 mg  - Goal isotretinoin dosage: 9900 mg  (66 kg * 150 mg/kg)  - 2 forms of contraception include: Postmenopausal  - Patient counseled to immediately notify provider of any concerning side effects  - Patient counseled to not share the medication with anyone nor donate blood while on isotretinoin       - Check baseline AST, ALT, and Triglycerides, then after 1 month of therapy, then repeat only if increasing dose or if abnormal  - Check urine pregnancy test today and at monthly follow-up.  - ISOtretinoin (ACCUTANE) 30 MG chemo capsule;  Take 1 capsule by mouth 2 times daily  Dispense: 60 capsule; Refill: 0  - ISOtretinoin (ACCUTANE) 30 MG chemo capsule; Take 1 capsule by mouth 2 times daily  Dispense: 60 capsule; Refill: 0    2. Rosacea  - chronic illness, responding to treatment but not yet at goal   - Compounded oxymetazoline/ivermectin ([employer]) in reserve  - External Referral To Dermatology      RTC 1M    Future Appointments   Date Time Provider Zane Olive   10/7/2022 10:00 AM John Wagner PA-C  derm [facility]   10/19/2022 11:00 AM STV CT ROOM 1 [facility] CT SCAN ST Radiolog   10/25/2022  9:45 AM Danita Heart MD Resp Spec [email or web address]         There are no Patient Instructions on file for this visit.       Electronically signed by John Wagner PA-C on 9/9/22 at 1:09 PM EDT

## 2022-09-22 ENCOUNTER — TELEPHONE (OUTPATIENT)
Dept: ONCOLOGY | Age: 57
End: 2022-09-22

## 2022-10-07 ENCOUNTER — OFFICE VISIT (OUTPATIENT)
Dept: DERMATOLOGY | Age: 57
End: 2022-10-07
Payer: MEDICARE

## 2022-10-07 VITALS
HEART RATE: 66 BPM | DIASTOLIC BLOOD PRESSURE: 78 MMHG | SYSTOLIC BLOOD PRESSURE: 113 MMHG | HEIGHT: 66 IN | OXYGEN SATURATION: 98 % | TEMPERATURE: 97.3 F | WEIGHT: 137 LBS | BODY MASS INDEX: 22.02 KG/M2

## 2022-10-07 DIAGNOSIS — Z79.899 ON ISOTRETINOIN THERAPY: ICD-10-CM

## 2022-10-07 DIAGNOSIS — L70.0 ACNE VULGARIS: Primary | ICD-10-CM

## 2022-10-07 DIAGNOSIS — L71.9 ROSACEA: ICD-10-CM

## 2022-10-07 PROCEDURE — G8420 CALC BMI NORM PARAMETERS: HCPCS | Performed by: PHYSICIAN ASSISTANT

## 2022-10-07 PROCEDURE — 99214 OFFICE O/P EST MOD 30 MIN: CPT | Performed by: PHYSICIAN ASSISTANT

## 2022-10-07 PROCEDURE — 3017F COLORECTAL CA SCREEN DOC REV: CPT | Performed by: PHYSICIAN ASSISTANT

## 2022-10-07 PROCEDURE — G8427 DOCREV CUR MEDS BY ELIG CLIN: HCPCS | Performed by: PHYSICIAN ASSISTANT

## 2022-10-07 PROCEDURE — 1036F TOBACCO NON-USER: CPT | Performed by: PHYSICIAN ASSISTANT

## 2022-10-07 PROCEDURE — G8484 FLU IMMUNIZE NO ADMIN: HCPCS | Performed by: PHYSICIAN ASSISTANT

## 2022-10-07 RX ORDER — ISOTRETINOIN 30 MG/1
30 CAPSULE ORAL 2 TIMES DAILY
Qty: 60 CAPSULE | Refills: 0 | Status: SHIPPED | OUTPATIENT
Start: 2022-10-07 | End: 2022-11-06

## 2022-10-07 NOTE — PROGRESS NOTES
capsule Take 1 capsule by mouth 2 times daily 60 capsule 0    EPINEPHrine (EPIPEN 2-CRUZ) 0.3 MG/0.3ML SOAJ injection Use as directed for allergic reaction 1 each 2    omeprazole (PRILOSEC) 40 MG delayed release capsule Take 1 capsule by mouth in the morning. 60 capsule 3    atorvastatin (LIPITOR) 20 MG tablet Take 1 tablet by mouth in the morning. 120 tablet 3    Ivermectin 1 % CREA Apply a pea sized amount, to central face and chin, nightly 45 g 2    INCRUSE ELLIPTA 62.5 MCG/INH AEPB inhale 1 puff by mouth and INTO THE LUNGS once daily 1 each 8    tazarotene (AVAGE) 0.1 % cream apply to affected area topically every evening 30 g 5    Fluticasone furoate-vilanterol (BREO ELLIPTA) 200-25 MCG/INH AEPB inhaler Inhale 1 puff into the lungs daily 1 each 11    albuterol sulfate HFA (VENTOLIN HFA) 108 (90 Base) MCG/ACT inhaler Inhale 2 puffs into the lungs every 6 hours as needed for Wheezing or Shortness of Breath 1 each 11    fluticasone (FLONASE) 50 MCG/ACT nasal spray 1 spray by Each Nostril route daily 32 g 1    ibuprofen (ADVIL;MOTRIN) 800 MG tablet Take 1 tablet by mouth 2 times daily as needed for Pain 60 tablet 0    senna-docusate (RA P COL-RITE) 8.6-50 MG per tablet take 2 tablets by mouth once daily if needed for constipation 30 tablet 3    diphenhydrAMINE (BENADRYL) 25 MG capsule Take 2 capsules by mouth every 6 hours as needed for Itching 30 capsule 0    ferrous sulfate 325 (65 FE) MG tablet take 1 tablet by mouth once daily WITH BREAKFAST 30 tablet 3    TRINTELLIX 20 MG TABS tablet 30 mg   0     No current facility-administered medications for this visit.        ALLERGIES:   Allergies   Allergen Reactions    Sesame [Oleum Sesami] Anaphylaxis     Sesame seeds    Codeine Nausea And Vomiting       SOCIAL HISTORY:  Social History     Tobacco Use    Smoking status: Former     Packs/day: 1.00     Years: 34.00     Pack years: 34.00     Types: Cigarettes     Start date: 6/30/1977     Quit date: 2/28/2014     Years since quittin.6    Smokeless tobacco: Never   Substance Use Topics    Alcohol use: Not Currently     Comment: quit 2214       Pertinent ROS:  Review of Systems  Skin: Denies any new changing, growing or bleeding lesions or rashes except as described in the HPI   Constitutional: Denies fevers, chills, and malaise. PHYSICAL EXAM:   /78   Pulse 66   Temp 97.3 °F (36.3 °C)   Ht 5' 6\" (1.676 m)   Wt 137 lb (62.1 kg)   SpO2 98%   BMI 22.11 kg/m²     The patient is generally well appearing, well nourished, alert and conversational. Affect is normal.    Cutaneous Exam:  Physical Exam  Face and neck only was examined. Facial covering was removed during examination. Diagnoses/exam findings/medical history pertinent to this visit are listed below:    Assessment:   Diagnosis Orders   1. Acne vulgaris  ISOtretinoin (ACCUTANE) 30 MG chemo capsule      2. Rosacea        3. On isotretinoin therapy             Plan:  1. Acne vulgaris  - chronic illness, responding to treatment but not yet at goal   Isotretinoin Continuation  - Isotretinoin start date: 22  - Months of completed treatment: 6  - Current isotretinoin dose: 60 mg/day  - Anticipated isotretinoin dose for upcoming month: 60 mg/kg - LAST MONTH OF Tx UPCOMING  - Cumulative isotretinoin dose: 9900 mg  - Goal isotretinoin dosage: 9900 mg  (66 kg * 150 mg/kg)  - 2 forms of contraception include: Postmenopausal  - Patient counseled to immediately notify provider of any concerning side effects  - Patient counseled to not share the medication with anyone nor donate blood while on isotretinoin       - ISOtretinoin (ACCUTANE) 30 MG chemo capsule; Take 1 capsule by mouth 2 times daily  Dispense: 60 capsule; Refill: 0    2. Rosacea  - chronic illness, responding to treatment but not yet at goal   - [place] compounded cream 1% Ivermectin/0.6%Oxymetazoline 1-2 times daily    3.  On isotretinoin therapy        RTC 1M    Future Appointments   Date Time Provider Zane Schaefer   10/19/2022 11:00 AM STV CT ROOM 1 [facility] CT SCAN [facility] Radiolog   10/25/2022  9:45 AM Raheel Austin [place], MD Resp Spec [email or web address]   11/7/2022 10:30 [facility] Mort[facility]         There are no Patient Instructions on file for this visit.       Electronically signed by Geary Mortimer, PA-C on 10/7/22 at 4:49 PM EDT

## 2022-10-13 ENCOUNTER — TELEPHONE (OUTPATIENT)
Dept: DERMATOLOGY | Age: 57
End: 2022-10-13

## 2022-10-13 NOTE — TELEPHONE ENCOUNTER
[facility]   [phone]  [facility]      [phone]    Managing nausea    Some people have an upset stomach after surgery. This is often because of anesthesia, pain, or pain medicine, or the stress of surgery. These tips will help you handle nausea and eat healthy foods as you get better. If you were on a special food plan before surgery, ask your healthcare provider if you should follow it while you get better. These tips may help:  Do not push yourself to eat. Your body will tell you when to eat and how much.  Start off with clear liquids and soup. They are easier to digest.  Next try semi-solid foods, such as mashed potatoes, applesauce, and gelatin, as you feel ready.  Slowly move to solid foods. Dont eat fatty, rich, or spicy foods at first.  Do not force yourself to have 3 large meals a day. Instead eat smaller amounts more often.  Take pain medicines with a small amount of solid food, such as crackers or toast, to avoid nausea.  ·     Patient wants to speak to Chrissy Bush, [address]. Patient hasn't received the new medication discussed at her Dermatology Appointment.

## 2022-10-18 ENCOUNTER — TELEPHONE (OUTPATIENT)
Dept: FAMILY MEDICINE CLINIC | Age: 57
End: 2022-10-18

## 2022-10-18 NOTE — TELEPHONE ENCOUNTER
Patient called office upset that her PCP was a resident that she hasn't met before, placed patient on hold the look at her chart, patient was seen by Dr Elisa Madrigal  in July and states that is who she prefers to see, went back to line and patient hung up, writer called patient back and left a message to inform patient that I have some openings for Dr Elisa Madrigal in November and left the office number for her to call.

## 2022-10-19 ENCOUNTER — HOSPITAL ENCOUNTER (OUTPATIENT)
Dept: CT IMAGING | Age: 57
Discharge: HOME | End: 2022-10-21
Payer: MEDICARE

## 2022-10-19 DIAGNOSIS — Z87.891 PERSONAL HISTORY OF TOBACCO USE: ICD-10-CM

## 2022-10-19 PROCEDURE — 71271 CT THORAX LUNG CANCER SCR C-: CPT

## 2022-10-25 ENCOUNTER — OFFICE VISIT (OUTPATIENT)
Dept: PULMONOLOGY | Age: 57
End: 2022-10-25
Payer: MEDICARE

## 2022-10-25 VITALS
TEMPERATURE: 98.1 F | DIASTOLIC BLOOD PRESSURE: 82 MMHG | HEIGHT: 66 IN | HEART RATE: 73 BPM | OXYGEN SATURATION: 97 % | WEIGHT: 135 LBS | BODY MASS INDEX: 21.69 KG/M2 | SYSTOLIC BLOOD PRESSURE: 110 MMHG

## 2022-10-25 DIAGNOSIS — R91.1 LUNG NODULE: ICD-10-CM

## 2022-10-25 DIAGNOSIS — Z87.891 HISTORY OF SMOKING AT LEAST 1 PACK PER DAY FOR AT LEAST 30 YEARS: ICD-10-CM

## 2022-10-25 DIAGNOSIS — J44.9 CHRONIC OBSTRUCTIVE PULMONARY DISEASE, UNSPECIFIED COPD TYPE (HCC): Primary | ICD-10-CM

## 2022-10-25 DIAGNOSIS — J45.30 MILD PERSISTENT ASTHMA WITHOUT COMPLICATION: ICD-10-CM

## 2022-10-25 PROCEDURE — G8420 CALC BMI NORM PARAMETERS: HCPCS | Performed by: INTERNAL MEDICINE

## 2022-10-25 PROCEDURE — 3023F SPIROM DOC REV: CPT | Performed by: INTERNAL MEDICINE

## 2022-10-25 PROCEDURE — 3017F COLORECTAL CA SCREEN DOC REV: CPT | Performed by: INTERNAL MEDICINE

## 2022-10-25 PROCEDURE — 1036F TOBACCO NON-USER: CPT | Performed by: INTERNAL MEDICINE

## 2022-10-25 PROCEDURE — G8484 FLU IMMUNIZE NO ADMIN: HCPCS | Performed by: INTERNAL MEDICINE

## 2022-10-25 PROCEDURE — G8427 DOCREV CUR MEDS BY ELIG CLIN: HCPCS | Performed by: INTERNAL MEDICINE

## 2022-10-25 PROCEDURE — 99214 OFFICE O/P EST MOD 30 MIN: CPT | Performed by: INTERNAL MEDICINE

## 2022-10-25 NOTE — PROGRESS NOTES
PULMONARY OUTPATIENT PROGRESS NOTE        Patient:  Wanda Hightower  YOB: 1965    MRN: [id number]     Acct:        Pt seen and Chart reviewed. Mr/Ms Wanda Hightower is here in followup for    Diagnosis   1. Chronic obstructive pulmonary disease, unspecified COPD type ([address]    2. Mild persistent asthma without complication    3. Fatigue, unspecified type    4. Psychophysiological insomnia      Seizure was seen last time she did not have any exacerbation of chronic bronchitis/COPD. She did not require antibiotic or steroid use. When she was seen last time Breo was added and according to patient since Terrye Kosta was added she is feeling better. She think that she has slight more energy also along with improvement in her breathing. She did use 1 time albuterol when she had acute feeling of chest tightness and shortness of breath and after using albuterol it had improved. She has been taking Incruse once daily. When she has more humidity weather changes she has more chest tightness. Currently patient she has heartburn intermittently she had some weight loss as she cut down her nighttime meal.  After she take Breo she brings some sputum but denies otherwise any change in her sputum or increase in sputum or change in color. She is able to do her regular activities she denies shortness of breath except on exertional activity. She denies nocturnal awakening with cough wheezing chest tightness or shortness of breath. Recently especially few weeks ago when there is weather changes then she had occasional cough otherwise denies daily or persistent cough. She does not take naps does not doze off during the daytime she usually go to sleep around 830 and wake up around 5:30 in the morning when she wake up she usually feels fresh.     She had a low-dose CT scan of the chest done on 10/19/2022 on review of the CT scan and left lateral pleural-based scarlike nodule which had been stable. Right middle lobe lung nodule about 4 mm in right lower lobe nodule the same as it was before as compared to CT in October 2021 and also from October 2020 and 2019. She had pulmonary function test done on 05/18/2022 which shows mild obstruction with normal FEV1 but shows significant airway trapping as evidenced by both increase residual volume to 237% and total lung capacity of 141%. Previous history and workup  Her last pulmonary function test showed airway reversibility and normal lung volume and normal diffusion capacity and she most likely have more asthmatic bronchitis than fixed obstruction.   H/O pleural empyema and decortication in 2014, h/o asthma / COPD  H/O RML lung nodule and follow up CT did not show nodule and did not require further Ct scan  H/O smoking 37 years 1 PPD and stopped in 2014      Subjective:   Review of Systems -  General ROS: Negative for fatigue negative for weight loss fever night sweats  ENT ROS: Negative for - epistaxis, headaches, nasal congestion, nasal polyps, tinnitus, vertigo or visual changes  Allergy and Immunology ROS: negative for - nasal congestion or postnasal drip  Respiratory ROS: Positive for dyspnea on severe exertion only and mild occasional cough, negative for hemoptysis, orthopnea, shortness of breath, sputum changes, tachypnea or wheezing  Cardiovascular ROS:  -Negative for dyspnea on activities, negative for edema, irregular heartbeat, loss of consciousness, murmur, orthopnea, paroxysmal nocturnal dyspnea or rapid heart rate  Gastrointestinal ROS: no abdominal pain, change in bowel habits, or black or bloody stools  Musculoskeletal ROS: negative for - gait disturbance, joint pain or joint stiffness  CNS: negative for dizziness diplopia weakness tingling numbness syncope  Skin and lymphatics: negative for skin rash and skin lesion  Hem/onc: negative for petechia, easy bleeding and bruising    Sleep Medicine 7/27/2021 Sitting and reading 1   Watching TV 1   Sitting, inactive in a public place (e.g. a theatre or a meeting) 0   As a passenger in a car for an hour without a break 0   Lying down to rest in the afternoon when circumstances permit 1   Sitting and talking to someone 0   Sitting quietly after a lunch without alcohol 1   In a car, while stopped for a few minutes in traffic 0   Total score 4       Allergies: Allergies   Allergen Reactions    Sesame [Oleum Sesami] Anaphylaxis     Sesame seeds    Codeine Nausea And Vomiting       Medications:  Outpatient Encounter Medications as of 10/25/2022   Medication Sig Dispense Refill    ISOtretinoin (ACCUTANE) 30 MG chemo capsule Take 1 capsule by mouth 2 times daily 60 capsule 0    EPINEPHrine (EPIPEN 2-CRUZ) 0.3 MG/0.3ML SOAJ injection Use as directed for allergic reaction 1 each 2    omeprazole (PRILOSEC) 40 MG delayed release capsule Take 1 capsule by mouth in the morning. 60 capsule 3    atorvastatin (LIPITOR) 20 MG tablet Take 1 tablet by mouth in the morning.  120 tablet 3    Ivermectin 1 % CREA Apply a pea sized amount, to central face and chin, nightly 45 g 2    INCRUSE ELLIPTA 62.5 MCG/INH AEPB inhale 1 puff by mouth and INTO THE LUNGS once daily 1 each 8    tazarotene (AVAGE) 0.1 % cream apply to affected area topically every evening 30 g 5    Fluticasone furoate-vilanterol (BREO ELLIPTA) 200-25 MCG/INH AEPB inhaler Inhale 1 puff into the lungs daily 1 each 11    albuterol sulfate HFA (VENTOLIN HFA) 108 (90 Base) MCG/ACT inhaler Inhale 2 puffs into the lungs every 6 hours as needed for Wheezing or Shortness of Breath 1 each 11    fluticasone (FLONASE) 50 MCG/ACT nasal spray 1 spray by Each Nostril route daily 32 g 1    ibuprofen (ADVIL;MOTRIN) 800 MG tablet Take 1 tablet by mouth 2 times daily as needed for Pain 60 tablet 0    senna-docusate (RA P COL-RITE) 8.6-50 MG per tablet take 2 tablets by mouth once daily if needed for constipation 30 tablet 3    diphenhydrAMINE (BENADRYL) 25 MG capsule Take 2 capsules by mouth every 6 hours as needed for Itching 30 capsule 0    ferrous sulfate 325 (65 FE) MG tablet take 1 tablet by mouth once daily WITH BREAKFAST 30 tablet 3    TRINTELLIX 20 MG TABS tablet 30 mg   0     No facility-administered encounter medications on file as of 10/25/2022. Objective:    Physical Exam:  Vitals:   /82 (Site: Left Upper Arm, Position: Sitting, Cuff Size: Medium Adult)   Pulse 73   Temp 98.1 °F (36.7 °C)   Ht 5' 6\" (1.676 m)   Wt 135 lb (61.2 kg)   SpO2 97%   BMI 21.79 kg/m²   Last 3 weights: Wt Readings from Last 3 Encounters:   10/25/22 135 lb (61.2 kg)   10/07/22 137 lb (62.1 kg)   09/09/22 138 lb (62.6 kg)     Body mass index is 21.79 kg/m².     Physical Examination:   General appearance - alert, well appearing, and in no distress  Mental status - alert, oriented to person, place, and time  Eyes - pupils equal and reactive, extraocular eye movements intact  Ears - right ear normal, left ear normal  Nose - normal and patent, no erythema, discharge or polyps  Mouth - mucous membranes moist, pharynx normal without lesions  Neck - supple, no significant adenopathy  Chest -slightly distant breath sounds but present bilaterally clear to auscultation, no wheezes, rales or rhonchi, symmetric air entry  Heart - normal rate, regular rhythm, normal S1, S2, no murmurs, rubs, clicks or gallops  Abdomen - soft, nontender, nondistended, no masses or organomegaly  Neurological - alert, oriented, normal speech, no focal findings or movement disorder noted  Extremities - peripheral pulses normal, no pedal edema, no clubbing or cyanosis  Skin - normal coloration and turgor, no rashes, no suspicious skin lesions noted       Labs:  None    CBC:   WBC   Date Value Ref Range Status   10/21/2020 6.0 3.5 - 11.0 k/uL Final     Hemoglobin   Date Value Ref Range Status   10/21/2020 13.0 12.0 - 16.0 g/dL Final     Platelets   Date Value Ref Range Status 10/21/2020 302 140 - 450 k/uL Final     BMP:   Sodium   Date Value Ref Range Status   05/12/2021 144 135 - 144 mmol/L Final     Potassium   Date Value Ref Range Status   05/12/2021 4.0 3.7 - 5.3 mmol/L Final     Chloride   Date Value Ref Range Status   05/12/2021 105 98 - 107 mmol/L Final     CO2   Date Value Ref Range Status   05/12/2021 24 20 - 31 mmol/L Final     BUN   Date Value Ref Range Status   12/13/2021 12 6 - 20 mg/dL Final     Creatinine   Date Value Ref Range Status   12/13/2021 0.63 0.50 - 0.90 mg/dL Final   05/12/2021 0.64 0.50 - 0.90 mg/dL Final   10/21/2020 0.60 0.50 - 0.90 mg/dL Final     Glucose   Date Value Ref Range Status   12/13/2021 77 70 - 99 mg/dL Final     S. Calcium:   Calcium   Date Value Ref Range Status   05/12/2021 9.6 8.6 - 10.4 mg/dL Final     S. Ionized Calcium: No results found for: IONCA  S. Magnesium: No results found for: MG  S. Phosphorus: No results found for: PHOS  S. Glucose:   POC Glucose   Date Value Ref Range Status   02/19/2014 79 65 - 105 mg/dL Final   02/18/2014 126 (H) 65 - 105 mg/dL Final   02/17/2014 117 (H) 65 - 105 mg/dL Final     Glycosylated hemoglobin A1C:   Hemoglobin A1C   Date Value Ref Range Status   05/11/2021 5.0 % Final       Pulmonary Functions Testing Results:    05/18/2022. Pulmonary function test  Date of study 05/18/2022. Spirometry shows FVC is 2.9 to 89% predicted. FEV1 is 2.18 83% predicted both are within normal limit. FEV1 FVC ratio is consistent with mild obstructive ventilatory impairment. Lung volume shows residual volume is 4.68 to 237% predicted. Total lung capacity 7.52 141.9% predicted both consistent with severe airway trapping/hyperinflation.   Diffusion capacity is 19.79 120% predicted which is within normal limit.        01/23/17 FEV1 2.07 76%, post BD 86%(12% improvement), FVC 2.94 88%, TLC 4.65 87%, DLCO 17.67 86%    8/3/15: FEV1 1.72 63%,Post BD 83%  and 32% improvemnent, FVC PBD 3.19 88%, [id number] 93% TLC 1.70 85% , DLCO 4.12 86%    Blood Gases: No results found for: PH, PCO2, PO2, HCO3, O2SAT    Radiological reports:    CXR 8/12/2014  IMPRESSION:       No acute radiographic abnormality, or significant interval change. Mild    bibasilar subsegmental atelectasis and/or scarring. CT Scans    Low-dose CT chest 10/19/2022. No new lung nodule or mass. Stable prior sub 5 mm nodules 1 of which now   shows calcification in the middle lobe. Low-dose CT chest 10/18/2021.  1. No change in right middle and right lower lobe pulmonary nodules. 2. Mild emphysematous changes. 3. No change in a hypodense lesion in the left lobe of the liver. 4. No mediastinal adenopathy. Granulomatous changes. Low-dose CT chest 10/16/2020  1. Stable 3-4 mm pulmonary nodules in the right lower lobe and right middle   lobe. Mild emphysema. 2. Old granulomatous disease. 3. Moderate hiatal hernia. 4. Trace pericardial fluid. 5. Stable low-density lesion measuring 1 cm in the left hepatic lobe,   unchanged from 09/10/2019. Low-dose CT chest 09/10/2019  Mediastinum:  Heart size at the upper limit of normal.  Trace pericardial   effusion. Thoracic aorta is normal caliber. No lymphadenopathy. Calcified   right hilar lymph nodes. Thyroid and esophagus are unremarkable. Lungs/Pleura:  No consolidation or pleural effusion. Mild left apical   scarring. Mild scarring at the lung bases. Stable 4 mm nodules in the right   lower lobe on image 69 and right middle lobe on image 67. Scattered   calcified granulomas. Raina Scott 04/26/2016  Mild scarring changes similar to May 8, 2014 radiography. Calcified right hilar lymph nodes and right lung granulomas suggesting prior granulomatous disease. Small hiatal hernia.            Immunization History   Administered Date(s) Administered    COVID-19, PFIZER PURPLE top, DILUTE for use, (age 15 y+), 30mcg/0.3mL 02/25/2021, 03/18/2021, 01/22/2022    Influenza Virus Vaccine 02/13/2014    Influenza, AFLURIA (age 1 yrs+), FLUZONE, (age 10 mo+), MDV, 0.5mL 12/21/2017    Influenza, FLUARIX, FLULAVAL, FLUZONE (age 10 mo+) AND AFLURIA, (age 1 y+), PF, 0.5mL 11/20/2018, 01/03/2020, 12/08/2020, 12/13/2021    Influenza, Triv, 3 Years and older, IM (Afluria (5 yrs and older) 11/08/2016    Pneumococcal Conjugate 13-valent ([id number]) 11/08/2016    Pneumococcal Polysaccharide ([id number]) 02/13/2014    Tdap (Boostrix, Adacel) 04/30/2014    Zoster Recombinant (Shingrix) 12/14/2021, 04/15/2022       Assessment:      1. Chronic obstructive pulmonary disease, unspecified COPD type ([facility] [address]   2. Mild persistent asthma without complication   3. Fatigue, unspecified type improved   4. Psychophysiological insomnia improve       She was complaining of increasing shortness of breath although shortness of breath symptoms improve after ICS is changed to ICS/LABA and she is taking Breo doing better. She also had intermittent cough which is also better. She had pulmonary function test done on 05/18/2022 which shows mild obstruction with normal FEV1 but shows significant airway trapping as evidenced by both increase residual volume to 237% and total lung capacity of 141%. She had a CT low-dose done in October 22 which did not show any change in nodule there was no other consolidation infiltrate or acute or chronic changes seen at that time. Plan:      Pulmonary function test results seen from 05/18/2022  Continue with Incruse once daily. Continue Breo once daily. Technique of using medication discussed. Albuterol as needed  She had Covid vaccine already and had booster  Annual Flu vaccine she had it this year  Uptodate on pneumonia vaccine   Maintain an active lifestyle   As she had history of smoking for 37 years and stopped 7 years ago she will need yearly CT low dose for screening CT scan and next one will be October 2023     Regular sleep-wake cycle. Sleep hygiene discussed.   Given instruction for sleep hygiene. Adequate sleep duration    RTC 6 months or early if any worsening symptoms  Questions answered to pt's satisfaction      Please note that this chart was generated using voice recognition Dragon dictation software. Although every effort was made to ensure the accuracy of this automated transcription, some errors in transcription may have occurred.     Karoline Galarza MD, MD             10/25/2022, 10:16 AM

## 2022-11-22 ENCOUNTER — OFFICE VISIT (OUTPATIENT)
Dept: DERMATOLOGY | Age: 57
End: 2022-11-22
Payer: MEDICARE

## 2022-11-22 VITALS
DIASTOLIC BLOOD PRESSURE: 82 MMHG | HEIGHT: 66 IN | TEMPERATURE: 99.3 F | BODY MASS INDEX: 21.73 KG/M2 | WEIGHT: 135.2 LBS | OXYGEN SATURATION: 97 % | HEART RATE: 86 BPM | SYSTOLIC BLOOD PRESSURE: 119 MMHG

## 2022-11-22 DIAGNOSIS — L71.9 ROSACEA: ICD-10-CM

## 2022-11-22 DIAGNOSIS — L70.0 ACNE VULGARIS: Primary | ICD-10-CM

## 2022-11-22 PROCEDURE — 3017F COLORECTAL CA SCREEN DOC REV: CPT | Performed by: PHYSICIAN ASSISTANT

## 2022-11-22 PROCEDURE — G8427 DOCREV CUR MEDS BY ELIG CLIN: HCPCS | Performed by: PHYSICIAN ASSISTANT

## 2022-11-22 PROCEDURE — 1036F TOBACCO NON-USER: CPT | Performed by: PHYSICIAN ASSISTANT

## 2022-11-22 PROCEDURE — G8484 FLU IMMUNIZE NO ADMIN: HCPCS | Performed by: PHYSICIAN ASSISTANT

## 2022-11-22 PROCEDURE — G8420 CALC BMI NORM PARAMETERS: HCPCS | Performed by: PHYSICIAN ASSISTANT

## 2022-11-22 PROCEDURE — 99213 OFFICE O/P EST LOW 20 MIN: CPT | Performed by: PHYSICIAN ASSISTANT

## 2022-11-22 RX ORDER — TRETINOIN 0.5 MG/G
CREAM TOPICAL
Qty: 45 G | Refills: 3 | Status: SHIPPED | OUTPATIENT
Start: 2022-11-22

## 2022-11-22 NOTE — PATIENT INSTRUCTIONS
Tips for rosacea sufferers:    What to look for in sunscreen:  Zinc oxide, titanium dioxide, or both  Silicone (may be listed as dimethicone, orcyclomethicone, or cyclomethicone)  No fragrance (label may say fragrance free, but if it says unscented choose another sunscreen)  Broad-spectrum protection  SPF 30 or higher    What to look for in moisturizers & sunscreen:  Hyaluronic acid  Niacinamide  Licorice (glycyrrhiza glabra, Licochalcone A)   Ceramides  Silicone (may be listed as dimethicone, orcyclomethicone, or cyclomethicone)    What to avoid in products  Fragrance  Menthol  Eucalyptus   Essential oils  Camphor  Alcohol   Peppermint  Witch Hazel      Some fragrance names  Balsam of peru (myroxylon pereirae)  Cinnamic aldehyde  Cinnamyl alcohol  Hydroxycitronellal  Isoeugenol  Eugenol  Oak cunha absolute  Oil of Bergamot  alpha-amyl cinnamic aldehyde  geraniol  Linalool  Lavender  Perfumes  Toilet water  Colognes  Masking perfumes  Unscented perfumes  Aroma chemicals  Essential oils  [place] oil  Cinnamon  Cloves  Citronella   Ethylene bassylate  Sweet Orange  Sweet Basil  Citrus   Lemon peel  Limonene   ROSACEA    What is rosacea? Rosacea is a common skin condition, usually occurring on the face, which predominantly affects fair-skinned but may affect all skin types in people aged 36to 61years old. It is more common in women but when affecting men, it may be more severe. It is a chronic condition, and can persist for a long time and, in any individual, the severity tends to fluctuate. Rosacea tends to affect the cheeks, forehead, chin and nose, and is characterised by persistent redness caused by dilated blood vessels, small bumps and pus-filled spots similar to acne There may also be uncomfortable inflammation of the surface of the eyes and eyelids. Rosacea is classified into 4 subtypes that may overlap. Your doctor will advise you of the type you have. What causes rosacea?       The cause of rosacea is not fully understood. Your genetics, immune system factors, and environmental factors may all play a part. Factors that trigger rosacea cause the blood vessels in the skin of the face to enlarge (dilate). The theory that rosacea is due to bacteria on the skin or in the gut has not been proven. However, antibiotics have proven helpful to treat rosacea. This is because of their anti-inflammatory effect. Rosacea is not contagious. There are a variety of triggers that may make rosacea worse. These include alcohol, exercise, high and low temperatures, hot drinks, spicy foods and stress. Rosacea can be sun sensitive. Is rosacea hereditary? Rosacea does seem to run in some families, but there is no clear genetic link. What are the symptoms of rosacea? The rash and the blushing associated with rosacea can lead to embarrassment, lowered self-esteem and self-confidence, anxiety and even depression. Furthermore, the skin of the face is often sensitive, and the affected area can feel very hot or sting. Some people with rosacea have eye symptoms. A few patients with rosacea may develop more serious eye problems, such as painful inflammation involving the front part of the eye (rosacea keratitis) and this may cause blurred vision. It is important that you consult a dermatologist or an    if you develop symptoms affecting the eyes. What does rosacea look like? Rosacea usually starts with a tendency to blush easily. After a while, the central areas of the face become a permanent deeper shade of red, with small dilated blood vessels, bumps and pus-filled spots. Occasionally, there may be some swelling of the facial skin (lymphoedema), especially around the eyes. Occasionally, an overgrowth of the oil-secreting glands on the nose may cause the nose to become enlarged, bulbous and red (called rhinophyma). Rhinophyma is more common in men than women.     How will rosacea be diagnosed? Rosacea can be diagnosed by its appearance. Specific tests are not usually required. Can rosacea be cured? No, but long-term treatments can be helpful. How can rosacea be treated? The inflammation that accompanies rosacea can be treated with preparations applied to the skin or taken by mouth; however, not all these will help the redness or blushing that may be associated with rosacea. Local applications: The inflammatory element of rosacea may be controlled with a medication applied to the affected areas It takes at least 8 weeks for their effect to become evident and some applications work specifically to reduce the redness associated with rosacea     Oral antibiotics: These are helpful for the inflammatory element of moderate or severe rosacea. The most commonly used antibiotics belong to the tetracycline group and include tetracycline, oxytetracycline, doxycycline, lymecycline and minocycline. Erythromycin is another commonly used antibiotic. The duration of an antibiotic course depends on your response. Your doctor may suggest that you use a cream and oral treatment together. Other treatments: An eye specialist should manage the severe types of eye involvement. A bulbous nose affected by rhinophyma can be reduced by a dermatologist or a plastic surgeon  Redness and dilated blood vessels can be treated with laser therapy. by a dermatologist.  A beta-blocker tablet or clonidine may be prescribed by the dermatologist it may help if blushing is a significant problem. Isotretinoin tablets are sometimes prescribed by a dermatologist for severe rosacea. Self Care (What can I do?)    --Protect your skin from the sun by using a sun block (with a sun protection factor of at least 30) on your face every day and needs re-applying frequently if outdoors. --Do not rub or scrub your face when cleansing as this can make rosacea worse.   --Do not use perfumed soap as this can make rosacea worse. --Use a soap substitute (emollient) to cleanse your face. --Use an unperfumed moisturiser on a regular basis if your skin is dry or sensitive. --Consider the lifestyle factors that can worsen rosacea   and avoid them; a written record of your flare-ups may help. --Some Cosmetics can often cover up rosacea effectively, and some rosacea patients may benefit from the use of skin camouflage. This may help hide excessive redness. --Unless they are specifically recommended to you by your dermatologist it may be best to avoid some treatments for acne, as they can irritate skin that is prone to rosacea. --Do not use topical preparations containing corticosteroids, unless specifically recommended by your dermatologist as these may make rosacea worse in the long run. --If your eyes are affected, do not ignore them - consult your dermatologist or an eye specialist doctor. --Some drugs can aggravate blushing, and your doctor or dermatologist may make appropriate changes to your medication.     Source: [facility]

## 2022-11-23 NOTE — PROGRESS NOTES
Dermatology Patient Note  Maude  [address]  [facility]  Dept: [phone]  Dept Fax: [phone]      VISITDATE: 11/22/2022   REFERRING PROVIDER: No ref. provider found      Nadir Cabrera is a 61 y.o. female  who presents today in the office for:    Acne (Pt states her acne is much better ), Follow-up, and Medication Refill (Taztrotene Ivermectin )      HISTORY OF PRESENT ILLNESS:  As above. Finished course of isotretinoin, but has not been able to tolerate Tazarotene d/t dryness. Pt states that when she discontinues Tazarotene, her acne flares. Also using topical ivermectin compounded for central face rosacea.     MEDICAL PROBLEMS:  Patient Active Problem List    Diagnosis Date Noted    Hallux abducto valgus, right     Pain in right foot     Polyuria 05/11/2021    Urinary frequency 05/11/2021    Gastroesophageal reflux disease with esophagitis 12/08/2020    Chronic bilateral low back pain without sciatica 12/08/2020    Gingivitis 12/08/2020    Bulimia nervosa, purging type 05/26/2020    Ear fullness, right 06/19/2018    High cholesterol 04/19/2018    Constipation 04/19/2018    Viral URI 08/22/2017    Medication refill 08/23/2016    Seasonal allergies 08/23/2016    Acne comedone 08/23/2016    Bunion 08/23/2016    Orthostatic hypotension 03/09/2015    COPD (chronic obstructive pulmonary disease) ([facility] 08/07/2014    H/O chronic respiratory failure 07/03/2014    GERD (gastroesophageal reflux disease) 04/30/2014    Empyema ([facility] 02/19/2014    Hypoxia 02/13/2014    Dental caries 09/17/2013    Smoking 09/17/2013    Vargas's esophagus without dysplasia 05/10/2012    Acne 05/10/2012       CURRENT MEDICATIONS:   Current Outpatient Medications   Medication Sig Dispense Refill    tretinoin (RETIN-A) 0.05 % cream Apply a pea sized amount to face nightly 45 g 3    EPINEPHrine (EPIPEN 2-CRUZ) 0.3 MG/0.3ML SOAJ injection Use as directed for allergic reaction 1 each 2    omeprazole (PRILOSEC) 40 MG delayed release capsule Take 1 capsule by mouth in the morning. 60 capsule 3    atorvastatin (LIPITOR) 20 MG tablet Take 1 tablet by mouth in the morning. 120 tablet 3    Ivermectin 1 % CREA Apply a pea sized amount, to central face and chin, nightly 45 g 2    INCRUSE ELLIPTA 62.5 MCG/INH AEPB inhale 1 puff by mouth and INTO THE LUNGS once daily 1 each 8    tazarotene (AVAGE) 0.1 % cream apply to affected area topically every evening 30 g 5    Fluticasone furoate-vilanterol (BREO ELLIPTA) 200-25 MCG/INH AEPB inhaler Inhale 1 puff into the lungs daily 1 each 11    albuterol sulfate HFA (VENTOLIN HFA) 108 (90 Base) MCG/ACT inhaler Inhale 2 puffs into the lungs every 6 hours as needed for Wheezing or Shortness of Breath 1 each 11    fluticasone (FLONASE) 50 MCG/ACT nasal spray 1 spray by Each Nostril route daily 32 g 1    ibuprofen (ADVIL;MOTRIN) 800 MG tablet Take 1 tablet by mouth 2 times daily as needed for Pain 60 tablet 0    senna-docusate (RA P COL-RITE) 8.6-50 MG per tablet take 2 tablets by mouth once daily if needed for constipation 30 tablet 3    diphenhydrAMINE (BENADRYL) 25 MG capsule Take 2 capsules by mouth every 6 hours as needed for Itching 30 capsule 0    ferrous sulfate 325 (65 FE) MG tablet take 1 tablet by mouth once daily WITH BREAKFAST 30 tablet 3    TRINTELLIX 20 MG TABS tablet 30 mg   0     No current facility-administered medications for this visit.        ALLERGIES:   Allergies   Allergen Reactions    Sesame [Oleum Sesami] Anaphylaxis     Sesame seeds    Codeine Nausea And Vomiting       SOCIAL HISTORY:  Social History     Tobacco Use    Smoking status: Former     Packs/day: 1.00     Years: 34.00     Pack years: 34.00     Types: Cigarettes     Start date: 1977     Quit date: 2014     Years since quittin.7    Smokeless tobacco: Never   Substance Use Topics    Alcohol use: Not Currently     Comment: quit 2/06/2214       Pertinent ROS:  Review of Systems  Skin: Denies any new changing, growing or bleeding lesions or rashes except as described in the HPI   Constitutional: Denies fevers, chills, and malaise. PHYSICAL EXAM:   /82   Pulse 86   Temp 99.3 °F (37.4 °C) (Temporal)   Ht 5' 6\" (1.676 m)   Wt 135 lb 3.2 oz (61.3 kg)   SpO2 97%   BMI 21.82 kg/m²     The patient is generally well appearing, well nourished, alert and conversational. Affect is normal.    Cutaneous Exam:  Physical Exam  Face and neck only was examined. Facial covering was removed during examination. Diagnoses/exam findings/medical history pertinent to this visit are listed below:    Assessment:   Diagnosis Orders   1. Acne vulgaris  tretinoin (RETIN-A) 0.05 % cream      2. Rosacea             Plan:  1. Acne vulgaris  - chronic illness, responding to treatment but not yet at goal   - D/C tazarotene and change to tretinoin 0.05%cream  - tretinoin (RETIN-A) 0.05 % cream; Apply a pea sized amount to face nightly  Dispense: 45 g; Refill: 3    2.  Rosacea  - stable chronic illness   - Continue compounded ivermectin       RTC 6M    Future Appointments   Date Time Provider Zane Olive   12/13/2022  1:45 PM STA DIAG MAMMO RM 3 STAZ MAMMO STA Radiolog   12/19/2022  1:30 PM [facility]   4/25/2023 10:30 AM Joseph Hidalgo MD Resp Spec Caleb Slava   5/23/2023  2:00 PM Johan Bruno PA-C  derm [facility]         Patient Instructions   Tips for rosacea sufferers:    What to look for in sunscreen:  Zinc oxide, titanium dioxide, or both  Silicone (may be listed as dimethicone, orcyclomethicone, or cyclomethicone)  No fragrance (label may say fragrance free, but if it says unscented choose another sunscreen)  Broad-spectrum protection  SPF 30 or higher    What to look for in moisturizers & sunscreen:  Hyaluronic acid  Niacinamide  Licorice (glycyrrhiza glabra, Licochalcone A)   Ceramides  Silicone (may be listed as dimethicone, orcyclomethicone, or cyclomethicone)    What to avoid in products  Fragrance  Menthol  Eucalyptus   Essential oils  Camphor  Alcohol   Peppermint  Witch Hazel      Some fragrance names  Balsam of peru (myroxylon pereirae)  Cinnamic aldehyde  Cinnamyl alcohol  Hydroxycitronellal  Isoeugenol  Eugenol  Oak cunha absolute  Oil of Bergamot  alpha-amyl cinnamic aldehyde  geraniol  Linalool  Lavender  Perfumes  Toilet water  Colognes  Masking perfumes  Unscented perfumes  Aroma chemicals  Essential oils  [place]owndes oil  Cinnamon  Cloves  Citronella   Ethylene bassylate  Sweet Orange  Sweet Basil  Citrus   Lemon peel  Limonene   ROSACEA    What is rosacea? Rosacea is a common skin condition, usually occurring on the face, which predominantly affects fair-skinned but may affect all skin types in people aged 36to 61years old. It is more common in women but when affecting men, it may be more severe. It is a chronic condition, and can persist for a long time and, in any individual, the severity tends to fluctuate. Rosacea tends to affect the cheeks, forehead, chin and nose, and is characterised by persistent redness caused by dilated blood vessels, small bumps and pus-filled spots similar to acne There may also be uncomfortable inflammation of the surface of the eyes and eyelids. Rosacea is classified into 4 subtypes that may overlap. Your doctor will advise you of the type you have. What causes rosacea? The cause of rosacea is not fully understood. Your genetics, immune system factors, and environmental factors may all play a part. Factors that trigger rosacea cause the blood vessels in the skin of the face to enlarge (dilate). The theory that rosacea is due to bacteria on the skin or in the gut has not been proven. However, antibiotics have proven helpful to treat rosacea. This is because of their anti-inflammatory effect. Rosacea is not contagious.     There are a variety of triggers that may make medication applied to the affected areas It takes at least 8 weeks for their effect to become evident and some applications work specifically to reduce the redness associated with rosacea     Oral antibiotics: These are helpful for the inflammatory element of moderate or severe rosacea. The most commonly used antibiotics belong to the tetracycline group and include tetracycline, oxytetracycline, doxycycline, lymecycline and minocycline. Erythromycin is another commonly used antibiotic. The duration of an antibiotic course depends on your response. Your doctor may suggest that you use a cream and oral treatment together. Other treatments: An eye specialist should manage the severe types of eye involvement. A bulbous nose affected by rhinophyma can be reduced by a dermatologist or a plastic surgeon  Redness and dilated blood vessels can be treated with laser therapy. by a dermatologist.  A beta-blocker tablet or clonidine may be prescribed by the dermatologist it may help if blushing is a significant problem. Isotretinoin tablets are sometimes prescribed by a dermatologist for severe rosacea. Self Care (What can I do?)    --Protect your skin from the sun by using a sun block (with a sun protection factor of at least 30) on your face every day and needs re-applying frequently if outdoors. --Do not rub or scrub your face when cleansing as this can make rosacea worse. --Do not use perfumed soap as this can make rosacea worse. --Use a soap substitute (emollient) to cleanse your face. --Use an unperfumed moisturiser on a regular basis if your skin is dry or sensitive. --Consider the lifestyle factors that can worsen rosacea   and avoid them; a written record of your flare-ups may help. --Some Cosmetics can often cover up rosacea effectively, and some rosacea patients may benefit from the use of skin camouflage. This may help hide excessive redness.   --Unless they are specifically recommended to you by your dermatologist it may be best to avoid some treatments for acne, as they can irritate skin that is prone to rosacea. --Do not use topical preparations containing corticosteroids, unless specifically recommended by your dermatologist as these may make rosacea worse in the long run. --If your eyes are affected, do not ignore them - consult your dermatologist or an eye specialist doctor. --Some drugs can aggravate blushing, and your doctor or dermatologist may make appropriate changes to your medication.     Source: [facility] Dermat[facility]       Electronically signed by Belinda Finley PA-C on 11/23/22 at 10:34 AM EST

## 2022-12-19 ENCOUNTER — OFFICE VISIT (OUTPATIENT)
Dept: FAMILY MEDICINE CLINIC | Age: 57
End: 2022-12-19
Payer: MEDICARE

## 2022-12-19 VITALS
TEMPERATURE: 98.4 F | WEIGHT: 135.4 LBS | HEIGHT: 66 IN | HEART RATE: 84 BPM | SYSTOLIC BLOOD PRESSURE: 108 MMHG | DIASTOLIC BLOOD PRESSURE: 71 MMHG | BODY MASS INDEX: 21.76 KG/M2

## 2022-12-19 DIAGNOSIS — K59.00 CONSTIPATION, UNSPECIFIED CONSTIPATION TYPE: ICD-10-CM

## 2022-12-19 DIAGNOSIS — T78.2XXS ANAPHYLAXIS, SEQUELA: ICD-10-CM

## 2022-12-19 DIAGNOSIS — Z76.0 MEDICATION REFILL: ICD-10-CM

## 2022-12-19 DIAGNOSIS — M54.50 CHRONIC BILATERAL LOW BACK PAIN WITHOUT SCIATICA: ICD-10-CM

## 2022-12-19 DIAGNOSIS — E78.00 HIGH CHOLESTEROL: ICD-10-CM

## 2022-12-19 DIAGNOSIS — K22.70 BARRETT'S ESOPHAGUS WITHOUT DYSPLASIA: ICD-10-CM

## 2022-12-19 DIAGNOSIS — G89.29 CHRONIC BILATERAL LOW BACK PAIN WITHOUT SCIATICA: ICD-10-CM

## 2022-12-19 DIAGNOSIS — Z12.31 ENCOUNTER FOR SCREENING MAMMOGRAM FOR BREAST CANCER: Primary | ICD-10-CM

## 2022-12-19 DIAGNOSIS — J44.9 CHRONIC OBSTRUCTIVE PULMONARY DISEASE, UNSPECIFIED COPD TYPE (HCC): ICD-10-CM

## 2022-12-19 PROCEDURE — 99213 OFFICE O/P EST LOW 20 MIN: CPT | Performed by: STUDENT IN AN ORGANIZED HEALTH CARE EDUCATION/TRAINING PROGRAM

## 2022-12-19 PROCEDURE — 3023F SPIROM DOC REV: CPT | Performed by: STUDENT IN AN ORGANIZED HEALTH CARE EDUCATION/TRAINING PROGRAM

## 2022-12-19 PROCEDURE — 1036F TOBACCO NON-USER: CPT | Performed by: STUDENT IN AN ORGANIZED HEALTH CARE EDUCATION/TRAINING PROGRAM

## 2022-12-19 PROCEDURE — G8484 FLU IMMUNIZE NO ADMIN: HCPCS | Performed by: STUDENT IN AN ORGANIZED HEALTH CARE EDUCATION/TRAINING PROGRAM

## 2022-12-19 PROCEDURE — G8427 DOCREV CUR MEDS BY ELIG CLIN: HCPCS | Performed by: STUDENT IN AN ORGANIZED HEALTH CARE EDUCATION/TRAINING PROGRAM

## 2022-12-19 PROCEDURE — 3017F COLORECTAL CA SCREEN DOC REV: CPT | Performed by: STUDENT IN AN ORGANIZED HEALTH CARE EDUCATION/TRAINING PROGRAM

## 2022-12-19 PROCEDURE — G8420 CALC BMI NORM PARAMETERS: HCPCS | Performed by: STUDENT IN AN ORGANIZED HEALTH CARE EDUCATION/TRAINING PROGRAM

## 2022-12-19 RX ORDER — DIPHENHYDRAMINE HCL 25 MG
50 CAPSULE ORAL EVERY 6 HOURS PRN
Qty: 30 CAPSULE | Refills: 0 | Status: SHIPPED | OUTPATIENT
Start: 2022-12-19

## 2022-12-19 RX ORDER — EPINEPHRINE 0.3 MG/.3ML
INJECTION SUBCUTANEOUS
Qty: 1 EACH | Refills: 2 | Status: SHIPPED | OUTPATIENT
Start: 2022-12-19

## 2022-12-19 RX ORDER — ATORVASTATIN CALCIUM 20 MG/1
20 TABLET, FILM COATED ORAL DAILY
Qty: 120 TABLET | Refills: 3 | Status: SHIPPED | OUTPATIENT
Start: 2022-12-19

## 2022-12-19 RX ORDER — ALBUTEROL SULFATE 90 UG/1
2 AEROSOL, METERED RESPIRATORY (INHALATION) EVERY 6 HOURS PRN
Qty: 1 EACH | Refills: 11 | Status: SHIPPED | OUTPATIENT
Start: 2022-12-19

## 2022-12-19 RX ORDER — OMEPRAZOLE 40 MG/1
40 CAPSULE, DELAYED RELEASE ORAL DAILY
Qty: 60 CAPSULE | Refills: 3 | Status: SHIPPED | OUTPATIENT
Start: 2022-12-19 | End: 2023-04-18

## 2022-12-19 RX ORDER — IBUPROFEN 800 MG/1
800 TABLET ORAL 2 TIMES DAILY PRN
Qty: 60 TABLET | Refills: 0 | Status: SHIPPED | OUTPATIENT
Start: 2022-12-19

## 2022-12-19 NOTE — PROGRESS NOTES
Attending Physician Statement  I have discussed the care of Yin Rios 61 y.o. female, including pertinent history and exam findings, with the resident Dr. Marlon Monet MD.    History and Exam:   Chief Complaint   Patient presents with    Medication Refill     Follow up on med refills     Knee Pain     Left knee is painful        Past Medical History:   Diagnosis Date    Acne     Dr. Morenita Lemus Dermat[facility]. annual visits    Vargas's esophagus     for 20 yrs on and off. [address]    Bipolar disorder [facility])     recently diagnosed and placed on meds. No longer an issue since sobriety    Bunion of great toe of left foot     Bunion of great toe of right foot     Dr. Rip Hudson CPM    Chronic obstructive pulmonary disease (COPD) ([facility]     Dr. Obey Leal Pulmonology. last visit 7/2021    Depression     On Meds. Dr. Archibald Odor on [place]    Dyspnea on exertion     Eating disorder     bulemia    Empyema ([facility] 02/17/2014    left. ICU hospitalization 2/2014 for 3 weeks    Fatigue due to depression     GERD (gastroesophageal reflux disease)     H/O chronic respiratory failure     Moderate persistent asthma     Pneumonia 02/2014    3 week hospitalization    Seasonal allergies     Smoking     Substance abuse ([facility]     quit 02/2014    Wellness examination 09/2021    [facility]. next appointment 12/13/2021     Allergies   Allergen Reactions    Sesame [Oleum Sesami] Anaphylaxis     Sesame seeds    Codeine Nausea And Vomiting      I have seen and examined the patient and the key elements of the encounter have been performed by me.   BP Readings from Last 3 Encounters:   12/19/22 108/71   11/22/22 119/82   10/25/22 110/82     /71 (Site: Left Upper Arm, Position: Sitting, Cuff Size: Medium Adult)   Pulse 84   Temp 98.4 °F (36.9 °C) (Oral)   Ht 5' 5.98\" (1.676 m)   Wt 135 lb 6.4 oz (61.4 kg)   BMI 21.86 kg/m²   Lab Results   Component Value Date    WBC 6.0 10/21/2020    HGB 13.0 10/21/2020    HCT 39.4 10/21/2020     10/21/2020    CHOL 181 02/11/2021    TRIG 160 (H) 04/20/2022    HDL 62 06/14/2022    ALT 20 06/14/2022    AST 24 06/14/2022     05/12/2021    K 4.0 05/12/2021     05/12/2021    CREATININE 0.63 12/13/2021    BUN 12 12/13/2021    CO2 24 05/12/2021    TSH 0.43 02/18/2020    INR 1.2 02/21/2014    LABA1C 5.0 05/11/2021     Lab Results   Component Value Date    LABALBU 4.4 06/14/2022     Lab Results   Component Value Date    IRON 17 (L) 02/20/2014    TIBC 147 (L) 02/20/2014     Lab Results   Component Value Date    LDLCHOLESTEROL 100 06/14/2022     I agree with the assessment, plan and the diagnosis of    Diagnosis Orders   1. Encounter for screening mammogram for breast cancer  YOVANA Digital Screen Bilateral      2. Vargas's esophagus without dysplasia  omeprazole (PRILOSEC) 40 MG delayed release capsule      3. High cholesterol  atorvastatin (LIPITOR) 20 MG tablet      4. Medication refill  EPINEPHrine (EPIPEN 2-CRUZ) 0.3 MG/0.3ML SOAJ injection      5. Anaphylaxis, sequela  diphenhydrAMINE (BENADRYL) 25 MG capsule    EPINEPHrine (EPIPEN 2-CRUZ) 0.3 MG/0.3ML SOAJ injection      6. Chronic bilateral low back pain without sciatica  ibuprofen (ADVIL;MOTRIN) 800 MG tablet      7. Constipation, unspecified constipation type        8. Chronic obstructive pulmonary disease, unspecified COPD type (HCC)  albuterol sulfate HFA (VENTOLIN HFA) 108 (90 Base) MCG/ACT inhaler       . I agree with orders as documented by the resident. More than 25 minutes spent  in face to face encounter with the patient and more than half in counseling. Patient's questions were answered. Patient Voiced understanding to the counseling. Return in about 8 weeks (around 2/13/2023).    (GC Modifier)-Dr. Thomas Kc MD

## 2022-12-19 NOTE — PROGRESS NOTES
[address]    Family Medicine Residency Program - Outpatient Note      Subjective:      Jodee Howard is a 61 y.o. female  presented to the office on 12/19/22 with complaints of: Left knee pain    This is a 70-year-old female with a history of stage II COPD established with pulmonologist came in with a concern of left knee pain. Cc: Left Knee pain   Onset: 2 weeks ago  Mechanism: unknown  Characteristic: dull and aching  Duration: morning  Worsening/Improving: same  Aggravating factors: using stairs  Relieving factors: rest  Associated symptoms: stiffness  Radiation: none  Intervention tried: ibuprofen   Direct trauma/fall/injury: none  Weakness/numbess/tingling: none          Review of systems (Except what is mentioned in the HPI)  CONSTITUTIONAL: Negative  RESPIRATORY: Negative  CARDIOVASCULAR: Negative  GASTROINTESTINAL: Negative  GENITOURINARY: Negative   MUSCULOSKELETAL: Negative  NEUROLOGICAL: Negative  BEHAVIOR/PSYCH: Negative      Objective:      Vitals:    12/19/22 1320   BP: 108/71   Pulse: 84   Temp: 98.4 °F (36.9 °C)       General Appearance - Alert and oriented x 3  HEENT - No obvious deformity  Lungs - Bilateral good air entry , no wheezes or rales  Cardiovascular - Regular rate and rhythm. No murmur  Abdomen - Soft and nontender  Neurologic - No new focal motor or sensory deficits  Skin - No bruising or bleeding on exposed skin area  MSK -no joint swelling, redness, visible deformity, joint effusion appreciated. Nontender to tibial tuberosity and papilloma. Psych - normal affect       Assessment:        ICD-10-CM    1. Encounter for screening mammogram for breast cancer  Z12.31 YOVANA Digital Screen Bilateral      2. Vargas's esophagus without dysplasia  K22.70 omeprazole (PRILOSEC) 40 MG delayed release capsule      3. High cholesterol  E78.00 atorvastatin (LIPITOR) 20 MG tablet      4. Medication refill  Z76.0 EPINEPHrine (EPIPEN 2-CRUZ) 0.3 MG/0.3ML SOAJ injection      5. Anaphylaxis, sequela  T78. 2XXS EPINEPHrine (EPIPEN 2-CRUZ) 0.3 MG/0.3ML SOAJ injection      6. Chronic bilateral low back pain without sciatica  M54.50 ibuprofen (ADVIL;MOTRIN) 800 MG tablet    G89.29       7. Constipation, unspecified constipation type  K59.00           Plan:    Left knee pain likely secondary to osteoarthritis versus PFS. Patient was provided with PFS exercises, will reevaluate in 8 weeks if needed will order imaging at that time. Refilled all the medication after confirming with the patient, reordered mammogram.  Provided patient with urinary incontinence questionnaire as she is complaining of strong urge to urinate, denies any symptoms of incontinence while coughing or sneezing, no increased frequency of urination, no dysuria, no suprapubic tenderness. Will reassess in the next encounter, suspicion is that patient might have urge incontinence. Return in about 8 weeks (around 2/13/2023).        Requested Prescriptions     Signed Prescriptions Disp Refills    omeprazole (PRILOSEC) 40 MG delayed release capsule 60 capsule 3     Sig: Take 1 capsule by mouth daily    albuterol sulfate HFA (VENTOLIN HFA) 108 (90 Base) MCG/ACT inhaler 1 each 11     Sig: Inhale 2 puffs into the lungs every 6 hours as needed for Wheezing or Shortness of Breath    atorvastatin (LIPITOR) 20 MG tablet 120 tablet 3     Sig: Take 1 tablet by mouth daily    diphenhydrAMINE (BENADRYL) 25 MG capsule 30 capsule 0     Sig: Take 2 capsules by mouth every 6 hours as needed for Itching    EPINEPHrine (EPIPEN 2-CRUZ) 0.3 MG/0.3ML SOAJ injection 1 each 2     Sig: Use as directed for allergic reaction    ibuprofen (ADVIL;MOTRIN) 800 MG tablet 60 tablet 0     Sig: Take 1 tablet by mouth 2 times daily as needed for Pain       Medications Discontinued During This Encounter   Medication Reason    Ivermectin 1 % CREA LIST CLEANUP    diphenhydrAMINE (BENADRYL) 25 MG capsule REORDER    ibuprofen (ADVIL;MOTRIN) 800 MG tablet REORDER albuterol sulfate HFA (VENTOLIN HFA) 108 (90 Base) MCG/ACT inhaler REORDER    EPINEPHrine (EPIPEN 2-CRUZ) 0.3 MG/0.3ML SOAJ injection REORDER    omeprazole (PRILOSEC) 40 MG delayed release capsule REORDER    atorvastatin (LIPITOR) 20 MG tablet REORDER       Laurel received counseling on the following healthy behaviors: nutrition, exercise and medication adherence    Discussed use, benefit, and side effects of prescribed medications. Barriers to medication compliance addressed. All patient questions answered. Pt voiced understanding. Disclaimer: Some oral of this note was transcribed using voice-recognition software. This may cause typographical errors occasionally. Although all effort is made to fix these errors, please do not hesitate to contact our office if there Ty Lucio concern with the understanding of this note.     Cahmp Sorto  PGY-3  Family Medicine     12/19/2022  1:54 PM

## 2022-12-19 NOTE — PROGRESS NOTES
Visit Information    Have you changed or started any medications since your last visit including any over-the-counter medicines, vitamins, or herbal medicines? no   Have you stopped taking any of your medications? Is so, why? -  no  Are you having any side effects from any of your medications? - no    Have you seen any other physician or provider since your last visit? yes - Derm   Have you had any other diagnostic tests since your last visit?  no   Have you been seen in the emergency room and/or had an admission in a hospital since we last saw you?  no   Have you had your routine dental cleaning in the past 6 months?  no     Do you have an active MyChart account? If no, what is the barrier?   Yes    Patient Care Team:  Prentis Lanes, MD as PCP - General (Emergency Medicine)  Walt Damian MD as Surgeon (Cardiothoracic Surgery)  Jorge Luis Meek MD as Consulting Physician (Pulmonology)  [address] [place] [address] Road History Review  Past Medical, Family, and Social History reviewed and does contribute to the patient presenting condition    Health Maintenance   Topic Date Due    Breast cancer screen  07/25/2019    COVID-19 Vaccine (4 - Booster for Pfizer series) 03/19/2022    Flu vaccine (1) 08/01/2022    Annual Wellness Visit (AWV)  12/14/2022    Lipids  06/14/2023    Depression Screen  07/26/2023    Low dose CT lung screening  10/19/2023    DTaP/Tdap/Td vaccine (2 - Td or Tdap) 04/30/2024    Colorectal Cancer Screen  07/14/2026    Pneumococcal 0-64 years Vaccine (3 - PPSV23 if available, else PCV20) 07/17/2029    Shingles vaccine  Completed    Hepatitis C screen  Completed    HIV screen  Completed    Hepatitis A vaccine  Aged Out    Hib vaccine  Aged Out    Meningococcal (ACWY) vaccine  Aged Out

## 2022-12-20 ENCOUNTER — HOSPITAL ENCOUNTER (OUTPATIENT)
Dept: MAMMOGRAPHY | Age: 57
Discharge: HOME | End: 2022-12-22
Payer: MEDICARE

## 2022-12-20 DIAGNOSIS — Z12.31 ENCOUNTER FOR SCREENING MAMMOGRAM FOR BREAST CANCER: ICD-10-CM

## 2022-12-20 PROCEDURE — 77063 BREAST TOMOSYNTHESIS BI: CPT

## 2023-01-24 DIAGNOSIS — L70.0 ACNE VULGARIS: ICD-10-CM

## 2023-01-24 RX ORDER — TAZAROTENE 1 MG/G
CREAM TOPICAL
Qty: 30 G | Refills: 5 | Status: SHIPPED | OUTPATIENT
Start: 2023-01-24

## 2023-04-25 ENCOUNTER — OFFICE VISIT (OUTPATIENT)
Dept: PULMONOLOGY | Age: 58
End: 2023-04-25
Payer: MEDICARE

## 2023-04-25 VITALS
OXYGEN SATURATION: 98 % | RESPIRATION RATE: 18 BRPM | SYSTOLIC BLOOD PRESSURE: 104 MMHG | HEART RATE: 75 BPM | DIASTOLIC BLOOD PRESSURE: 70 MMHG | WEIGHT: 135 LBS | BODY MASS INDEX: 19.99 KG/M2 | HEIGHT: 69 IN

## 2023-04-25 DIAGNOSIS — Z87.891 HISTORY OF SMOKING AT LEAST 1 PACK PER DAY FOR AT LEAST 30 YEARS: ICD-10-CM

## 2023-04-25 DIAGNOSIS — J45.30 MILD PERSISTENT ASTHMA WITHOUT COMPLICATION: ICD-10-CM

## 2023-04-25 DIAGNOSIS — R91.1 LUNG NODULE: ICD-10-CM

## 2023-04-25 DIAGNOSIS — Z87.891 PERSONAL HISTORY OF TOBACCO USE: ICD-10-CM

## 2023-04-25 DIAGNOSIS — J44.9 CHRONIC OBSTRUCTIVE PULMONARY DISEASE, UNSPECIFIED COPD TYPE (HCC): Primary | ICD-10-CM

## 2023-04-25 PROCEDURE — 3017F COLORECTAL CA SCREEN DOC REV: CPT | Performed by: INTERNAL MEDICINE

## 2023-04-25 PROCEDURE — G8420 CALC BMI NORM PARAMETERS: HCPCS | Performed by: INTERNAL MEDICINE

## 2023-04-25 PROCEDURE — G0296 VISIT TO DETERM LDCT ELIG: HCPCS | Performed by: INTERNAL MEDICINE

## 2023-04-25 PROCEDURE — 3023F SPIROM DOC REV: CPT | Performed by: INTERNAL MEDICINE

## 2023-04-25 PROCEDURE — 99214 OFFICE O/P EST MOD 30 MIN: CPT | Performed by: INTERNAL MEDICINE

## 2023-04-25 PROCEDURE — G8427 DOCREV CUR MEDS BY ELIG CLIN: HCPCS | Performed by: INTERNAL MEDICINE

## 2023-04-25 PROCEDURE — 1036F TOBACCO NON-USER: CPT | Performed by: INTERNAL MEDICINE

## 2023-04-25 NOTE — PROGRESS NOTES
PULMONARY OUTPATIENT PROGRESS NOTE        Patient:  Lemar Ahumada  YOB: 1965    MRN: [id number]     Acct:        Pt seen and Chart reviewed. Mr/Ms Lemar Ahumada is here in followup for    Diagnosis   1. Chronic obstructive pulmonary disease, unspecified COPD type ([address]    2. Mild persistent asthma without complication    3. Fatigue, unspecified type    4. Psychophysiological insomnia      Since she was seen last time she did not have any exacerbation in steroids or antibiotic use according to patient she feels like that she had decreasing exercise tolerance with activities she used to do she get short of breath and she had use more frequent albuterol once or twice a week mostly on exertional activities. She does have shortness of breath on exertional activities and when she has to do fast walking or stairs but denies shortness of breath on regular activities according patient she is active she is able to do her regular activities. She has only occasional cough denies daily or persistent cough. She does not complain of wheezing denies sputum production denies nocturnal awakening with cough wheezing chest tightness or shortness of breath. She gets shortness of breath on picking up stuff or walking with heavy stuff. She is using Breo once daily and she is using Incruse once daily she denies any problem with medications denies any dysphagia or thrush. She get more problem with shortness of breath when there is weather changes or immediately when she feels chest tightness  She denies dozing off during the daytime. Usually when she wake up she feels fresh. Does not take nap during the daytime. She had a low-dose CT scan of the chest done on 10/19/2022 on review of the CT scan and left lateral pleural-based scarlike nodule which had been stable.   Right middle lobe lung nodule about 4 mm in right lower lobe nodule the same as it was

## 2023-05-23 ENCOUNTER — OFFICE VISIT (OUTPATIENT)
Dept: DERMATOLOGY | Age: 58
End: 2023-05-23
Payer: MEDICARE

## 2023-05-23 VITALS
DIASTOLIC BLOOD PRESSURE: 72 MMHG | HEART RATE: 84 BPM | TEMPERATURE: 96.8 F | OXYGEN SATURATION: 98 % | BODY MASS INDEX: 21.47 KG/M2 | HEIGHT: 66 IN | SYSTOLIC BLOOD PRESSURE: 108 MMHG | WEIGHT: 133.6 LBS

## 2023-05-23 DIAGNOSIS — L70.0 ACNE VULGARIS: ICD-10-CM

## 2023-05-23 DIAGNOSIS — L71.9 ROSACEA: Primary | ICD-10-CM

## 2023-05-23 PROCEDURE — 99213 OFFICE O/P EST LOW 20 MIN: CPT | Performed by: PHYSICIAN ASSISTANT

## 2023-05-23 PROCEDURE — G8427 DOCREV CUR MEDS BY ELIG CLIN: HCPCS | Performed by: PHYSICIAN ASSISTANT

## 2023-05-23 PROCEDURE — 3017F COLORECTAL CA SCREEN DOC REV: CPT | Performed by: PHYSICIAN ASSISTANT

## 2023-05-23 PROCEDURE — G8420 CALC BMI NORM PARAMETERS: HCPCS | Performed by: PHYSICIAN ASSISTANT

## 2023-05-23 PROCEDURE — 1036F TOBACCO NON-USER: CPT | Performed by: PHYSICIAN ASSISTANT

## 2023-05-23 RX ORDER — TRETINOIN 0.5 MG/G
CREAM TOPICAL
Qty: 45 G | Refills: 3 | Status: SHIPPED | OUTPATIENT
Start: 2023-05-23

## 2023-05-23 RX ORDER — VORTIOXETINE 10 MG/1
TABLET, FILM COATED ORAL
COMMUNITY
Start: 2023-05-21

## 2023-05-23 NOTE — PROGRESS NOTES
Dermatology Patient Note  [address]  [address] [facility] [address]  [address]  Dept: [phone]  Dept Fax: [phone]      VISITDATE: 5/23/2023   REFERRING PROVIDER: No ref. provider found      Tasneem Kohler is a 61 y.o. female  who presents today in the office for:    Acne and Rosacea      HISTORY OF PRESENT ILLNESS:  S/P isotretinoin, pt has been using topical ivermectin and tretinoin. She states that her skin is doing well. No breakouts on her face.     MEDICAL PROBLEMS:  Patient Active Problem List    Diagnosis Date Noted    Hallux abducto valgus, right     Pain in right foot     Polyuria 05/11/2021    Urinary frequency 05/11/2021    Gastroesophageal reflux disease with esophagitis 12/08/2020    Chronic bilateral low back pain without sciatica 12/08/2020    Gingivitis 12/08/2020    Bulimia nervosa, purging type 05/26/2020    Ear fullness, right 06/19/2018    High cholesterol 04/19/2018    Constipation 04/19/2018    Viral URI 08/22/2017    Medication refill 08/23/2016    Seasonal allergies 08/23/2016    Acne comedone 08/23/2016    Bunion 08/23/2016    Orthostatic hypotension 03/09/2015    COPD (chronic obstructive pulmonary disease) ([facility] [address] 08/07/2014    H/O chronic respiratory failure 07/03/2014    GERD (gastroesophageal reflux disease) 04/30/2014    Empyema ([facility] [address] 02/19/2014    Hypoxia 02/13/2014    Dental caries 09/17/2013    Smoking 09/17/2013    Vargas's esophagus without dysplasia 05/10/2012    Acne 05/10/2012       CURRENT MEDICATIONS:   Current Outpatient Medications   Medication Sig Dispense Refill    TRINTELLIX 10 MG TABS tablet       tretinoin (RETIN-A) 0.05 % cream Apply a pea sized amount to face nightly 45 g 3    INCRUSE ELLIPTA 62.5 MCG/ACT inhaler inhale 1 puff by mouth and INTO THE LUNGS once daily 1 each 7    BREO ELLIPTA 200-25 MCG/ACT AEPB inhaler inhale 1 puff by mouth and INTO THE LUNGS once daily

## 2023-06-16 PROBLEM — T78.2XXA ANAPHYLACTIC SYNDROME: Status: ACTIVE | Noted: 2023-06-16

## 2023-07-19 ENCOUNTER — HOSPITAL ENCOUNTER (EMERGENCY)
Age: 58
Discharge: HOME | End: 2023-07-19
Attending: EMERGENCY MEDICINE
Payer: MEDICARE

## 2023-07-19 VITALS
RESPIRATION RATE: 12 BRPM | TEMPERATURE: 98.1 F | DIASTOLIC BLOOD PRESSURE: 72 MMHG | OXYGEN SATURATION: 96 % | SYSTOLIC BLOOD PRESSURE: 106 MMHG | HEART RATE: 72 BPM | BODY MASS INDEX: 21.8 KG/M2 | WEIGHT: 135 LBS

## 2023-07-19 DIAGNOSIS — T78.2XXA ANAPHYLAXIS, INITIAL ENCOUNTER: Primary | ICD-10-CM

## 2023-07-19 PROCEDURE — 6370000000 HC RX 637 (ALT 250 FOR IP): Performed by: STUDENT IN AN ORGANIZED HEALTH CARE EDUCATION/TRAINING PROGRAM

## 2023-07-19 PROCEDURE — 99283 EMERGENCY DEPT VISIT LOW MDM: CPT

## 2023-07-19 RX ORDER — PREDNISONE 20 MG/1
50 TABLET ORAL ONCE
Status: COMPLETED | OUTPATIENT
Start: 2023-07-19 | End: 2023-07-19

## 2023-07-19 RX ORDER — EPINEPHRINE 0.3 MG/.3ML
0.3 INJECTION SUBCUTANEOUS ONCE
Qty: 2 EACH | Refills: 0 | Status: SHIPPED | OUTPATIENT
Start: 2023-07-19 | End: 2023-07-19

## 2023-07-19 RX ORDER — PREDNISONE 50 MG/1
50 TABLET ORAL DAILY
Qty: 5 TABLET | Refills: 0 | Status: SHIPPED | OUTPATIENT
Start: 2023-07-19 | End: 2023-07-24

## 2023-07-19 RX ADMIN — PREDNISONE 50 MG: 20 TABLET ORAL at 14:54

## 2023-07-19 ASSESSMENT — ENCOUNTER SYMPTOMS
NAUSEA: 0
VOMITING: 0
SHORTNESS OF BREATH: 0
ABDOMINAL PAIN: 0
FACIAL SWELLING: 0

## 2023-07-19 NOTE — ED PROVIDER NOTES
[facility] ED  Emergency Department Encounter  Emergency Medicine Resident     Pt Sloan Collet  MRN: [id number]  [address] [facility] [place] 1965  Date of evaluation: 7/19/23  PCP:  Shonna Pierre MD  Note Started: 2:59 PM EDT      CHIEF COMPLAINT       Chief Complaint   Patient presents with    Allergic Reaction     Sesame seeds        HISTORY OF PRESENT ILLNESS  (Location/Symptom, Timing/Onset, Context/Setting, Quality, Duration, Modifying Factors, Severity.)      Elizabeth Cruz is a 60 y.o. female who presents after giving herself 2 doses of epinephrine. 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 She was having some pizza at the zoo that had tahini on it, and started to have some lip swelling. She asked them about this and let her know that there was sesame and it. She took 2 Benadryl pills. She noticed that she had a little bit of difficulty swallowing which is one of her main symptoms when she has anaphylaxis. She took out her EpiPen and gave herself an injection. She did not wear this on seconds that she has not practiced this before and remove the EpiPen before 10 seconds. She was worried she did not receive the full amount of epinephrine she gave herself a second injection. Patient was brought in by EMS, no medications given with EMS. Reports that she is feeling much better since then. Has noticed the swelling in her lips are gone it is easier to swallow.     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 Patient received 2 doses of epinephrine and 2 Benadryl pills unknown dosage prior to arrival.  She reports significant improvement in her symptoms. No shortness of breath, swelling. We will continue to observe patient and likely discharge with steroids. Amount and/or Complexity of Data Reviewed  Independent Historian: EMS  External Data Reviewed: labs and notes. Risk  Prescription drug management. EMERGENCY DEPARTMENT COURSE:    ED Course as of 07/19/23 1512   Wed Jul 19, 2023   1419 Prior to arrival patient received 2 doses of IM epinephrine and 2 \"pills \"of Benadryl. [ML]   1438 Reports improvement of symptoms. No SOB, swelling, chest pain. [ML]      ED Course User Index  [ML] Swetha Araiza DO   Refilled patients epinephrine and given RX for prednisone. FINAL IMPRESSION      1. Anaphylaxis, initial encounter          DISPOSITION / PLAN     DISPOSITION Decision To Discharge 07/19/2023 03:01:13 PM      PATIENT REFERRED TO:  Melisa Sanford MD  [facility].   [address] [place] [address]  [phone]            DISCHARGE MEDICATIONS:  New Prescriptions    EPINEPHRINE (EPIPEN 2-CRUZ) 0.3 MG/0.3ML SOAJ INJECTION    Inject 0.3 mLs into the muscle once for 1 dose Use as directed for allergic reaction    PREDNISONE (DELTASONE) 50 MG TABLET    Take 1 tablet by mouth daily for 5 days       Naresh Ruiz DO  Emergency Medicine Resident    (Please note that portions of this note were completed with a voice recognition program.  Efforts were made to edit the dictations but occasionally words are mis-transcribed.)       Swetha Araiza DO  Resident  07/19/23 1512

## 2023-07-19 NOTE — ED NOTES
Pt to room 48 via EMS with c/o anaphylaxis. Pt reports that she is allergic to sesame seeds and that the restaurant she was at,  [employer] had tahini sauce on it and pt didn't realize. Pt reports that she started feeling her throat swell and was having trouble breathing. Pt reports that she took benadryl and used her epi pen twice before EMS arrived. Upon arrival to ED, pt is alert and oriented, speaking in complete sentences, no respiratory distress noted. Pt denies throat swelling, reports that she does not feel like her tongue is swollen either. Pt placed on continuous cardiac monitor, bp and pulse ox. IV established, blood work drawn. Pt alert and oriented x4, talking in complete sentences, respirations even and unlabored. Pt acting age appropriate. White board updated, will continue to plan of care.      Sam Damian RN  07/19/23 7813

## 2023-07-19 NOTE — DISCHARGE INSTRUCTIONS
Thank you for coming to [facility]. [facility] emergency department! You were seen today for anaphylaxis! Please  your prescriptions at the pharmacy. You were prescribed prednisone and epinephrine, please pick these medications up at the pharmacy. Follow up with your primary care doctor. If you have any worsening of symptoms or any other concerns, please return to the emergency department. We are always available!

## 2023-08-07 DIAGNOSIS — E78.00 HIGH CHOLESTEROL: ICD-10-CM

## 2023-08-07 RX ORDER — ATORVASTATIN CALCIUM 20 MG/1
TABLET, FILM COATED ORAL
Qty: 120 TABLET | Refills: 3 | OUTPATIENT
Start: 2023-08-07

## 2023-08-07 NOTE — TELEPHONE ENCOUNTER
Please address the medication refill and close the encounter. If I can be of assistance, please route to the applicable pool. Thank you.       Last visit: 6-16-23  Last Med refill: 6-16-23  Does patient have enough medication for 72 hours: No:     Next Visit Date:  Future Appointments   Date Time Provider [address]   10/23/2023 10:00 AM STV CT RHVC STVZ RHVC CT STV Radiolog   10/31/2023 10:30 AM MD Sissy Resp Spec [id number]   5/23/2024 10:00 AM Alisha Wilde PA-C  derm [address] Maintenance   Topic Date Due    COVID-19 Vaccine (4 - Booster for Pfizer series) 03/19/2022    Annual Wellness Visit (AWV)  12/14/2022    Flu vaccine (1) 08/01/2023    Low dose CT lung screening &/or counseling  10/19/2023    DTaP/Tdap/Td vaccine (2 - Td or Tdap) 04/30/2024    Lipids  06/16/2024    Depression Screen  06/16/2024    Breast cancer screen  12/20/2024    Colorectal Cancer Screen  07/14/2026    Pneumococcal 0-64 years Vaccine (3 - PPSV23 if available, else PCV20) 07/17/2029    Shingles vaccine  Completed    Hepatitis C screen  Completed    HIV screen  Completed    Hepatitis A vaccine  Aged Out    Hib vaccine  Aged Out    Meningococcal (ACWY) vaccine  Aged Out       Hemoglobin A1C (%)   Date Value   05/11/2021 5.0   04/19/2018 5.5   02/12/2014 5.7             ( goal A1C is < 7)   No components found for: LABMICR  LDL Cholesterol (mg/dL)   Date Value   06/16/2023 100   06/14/2022 100       (goal LDL is <100)   AST (U/L)   Date Value   06/16/2023 21     ALT (U/L)   Date Value   06/16/2023 18     BUN (mg/dL)   Date Value   06/16/2023 10     BP Readings from Last 3 Encounters:   07/19/23 106/72   06/16/23 117/75   05/23/23 108/72          (goal 120/80)    All Future Testing planned in [facility]  Lab Frequency Next Occurrence   CT Lung Screen (Annual/Baseline) Once 10/05/2023               Patient Active Problem List:     Vargas's esophagus without dysplasia     Acne     Dental caries     Smoking

## 2023-08-08 DIAGNOSIS — E78.00 HIGH CHOLESTEROL: ICD-10-CM

## 2023-08-08 NOTE — TELEPHONE ENCOUNTER
Last visit:   Last Med refill:   Does patient have enough medication for 72 hours: No:     Next Visit Date:  Future Appointments   Date Time Provider [address]   9/5/2023 10:40 AM MD Angelica Ceballos [facility]   10/23/2023 10:00 AM STV CT RHVC STVZ RHVC CT STV Radiolog   10/31/2023 10:30 AM MD Sissy Resp Spec [facility]   5/23/2024 10:00 AM Unique Ventura PA-C [address] Maintenance   Topic Date Due    COVID-19 Vaccine (4 - Booster for Pfizer series) 03/19/2022    Annual Wellness Visit (AWV)  12/14/2022    Flu vaccine (1) 08/01/2023    Low dose CT lung screening &/or counseling  10/19/2023    DTaP/Tdap/Td vaccine (2 - Td or Tdap) 04/30/2024    Lipids  06/16/2024    Depression Screen  06/16/2024    Breast cancer screen  12/20/2024    Colorectal Cancer Screen  07/14/2026    Pneumococcal 0-64 years Vaccine (3 - PPSV23 if available, else PCV20) 07/17/2029    Shingles vaccine  Completed    Hepatitis C screen  Completed    HIV screen  Completed    Hepatitis A vaccine  Aged Out    Hib vaccine  Aged Out    Meningococcal (ACWY) vaccine  Aged Out       Hemoglobin A1C (%)   Date Value   05/11/2021 5.0   04/19/2018 5.5   02/12/2014 5.7             ( goal A1C is < 7)   No components found for: LABMICR  LDL Cholesterol (mg/dL)   Date Value   06/16/2023 100   06/14/2022 100       (goal LDL is <100)   AST (U/L)   Date Value   06/16/2023 21     ALT (U/L)   Date Value   06/16/2023 18     BUN (mg/dL)   Date Value   06/16/2023 10     BP Readings from Last 3 Encounters:   07/19/23 106/72   06/16/23 117/75   05/23/23 108/72          (goal 120/80)    All Future Testing planned in [facility]  Lab Frequency Next Occurrence   CT Lung Screen (Annual/Baseline) Once 10/05/2023               Patient Active Problem List:     Vargas's esophagus without dysplasia     Acne     Dental caries     Smoking     Hypoxia     Empyema (HCC)     GERD (gastroesophageal reflux disease)     H/O chronic respiratory failure

## 2023-08-09 RX ORDER — ATORVASTATIN CALCIUM 20 MG/1
20 TABLET, FILM COATED ORAL DAILY
Qty: 120 TABLET | Refills: 3 | OUTPATIENT
Start: 2023-08-09

## 2023-10-09 ENCOUNTER — TELEPHONE (OUTPATIENT)
Dept: ONCOLOGY | Age: 58
End: 2023-10-09

## 2023-10-09 DIAGNOSIS — L70.0 ACNE VULGARIS: ICD-10-CM

## 2023-10-09 RX ORDER — FLUTICASONE FUROATE AND VILANTEROL TRIFENATATE 200; 25 UG/1; UG/1
POWDER RESPIRATORY (INHALATION)
Qty: 1 EACH | Refills: 0 | Status: SHIPPED | OUTPATIENT
Start: 2023-10-09

## 2023-10-09 RX ORDER — TRETINOIN 0.5 MG/G
CREAM TOPICAL
Qty: 45 G | Refills: 3 | Status: SHIPPED | OUTPATIENT
Start: 2023-10-09

## 2023-10-10 ENCOUNTER — OFFICE VISIT (OUTPATIENT)
Dept: FAMILY MEDICINE CLINIC | Age: 58
End: 2023-10-10
Payer: MEDICARE

## 2023-10-10 VITALS
SYSTOLIC BLOOD PRESSURE: 98 MMHG | BODY MASS INDEX: 22.18 KG/M2 | HEART RATE: 72 BPM | TEMPERATURE: 97.4 F | OXYGEN SATURATION: 97 % | WEIGHT: 138 LBS | DIASTOLIC BLOOD PRESSURE: 66 MMHG | HEIGHT: 66 IN

## 2023-10-10 DIAGNOSIS — K22.70 BARRETT'S ESOPHAGUS WITHOUT DYSPLASIA: ICD-10-CM

## 2023-10-10 DIAGNOSIS — Z00.00 HEALTHCARE MAINTENANCE: ICD-10-CM

## 2023-10-10 DIAGNOSIS — E78.00 HIGH CHOLESTEROL: ICD-10-CM

## 2023-10-10 DIAGNOSIS — E78.5 HYPERLIPIDEMIA, UNSPECIFIED HYPERLIPIDEMIA TYPE: Primary | ICD-10-CM

## 2023-10-10 PROCEDURE — 99211 OFF/OP EST MAY X REQ PHY/QHP: CPT | Performed by: FAMILY MEDICINE

## 2023-10-10 PROCEDURE — G8427 DOCREV CUR MEDS BY ELIG CLIN: HCPCS

## 2023-10-10 PROCEDURE — G8482 FLU IMMUNIZE ORDER/ADMIN: HCPCS

## 2023-10-10 PROCEDURE — G8420 CALC BMI NORM PARAMETERS: HCPCS

## 2023-10-10 PROCEDURE — 99213 OFFICE O/P EST LOW 20 MIN: CPT

## 2023-10-10 PROCEDURE — 3017F COLORECTAL CA SCREEN DOC REV: CPT

## 2023-10-10 PROCEDURE — 1036F TOBACCO NON-USER: CPT

## 2023-10-10 RX ORDER — ATORVASTATIN CALCIUM 20 MG/1
20 TABLET, FILM COATED ORAL DAILY
Qty: 120 TABLET | Refills: 3 | Status: SHIPPED | OUTPATIENT
Start: 2023-10-10

## 2023-10-10 RX ORDER — OMEPRAZOLE 40 MG/1
40 CAPSULE, DELAYED RELEASE ORAL DAILY
Qty: 30 CAPSULE | Refills: 3 | Status: SHIPPED | OUTPATIENT
Start: 2023-10-10 | End: 2024-02-07

## 2023-10-10 ASSESSMENT — PATIENT HEALTH QUESTIONNAIRE - PHQ9
SUM OF ALL RESPONSES TO PHQ9 QUESTIONS 1 & 2: 0
SUM OF ALL RESPONSES TO PHQ QUESTIONS 1-9: 0
SUM OF ALL RESPONSES TO PHQ QUESTIONS 1-9: 0
2. FEELING DOWN, DEPRESSED OR HOPELESS: 0
1. LITTLE INTEREST OR PLEASURE IN DOING THINGS: 0
SUM OF ALL RESPONSES TO PHQ QUESTIONS 1-9: 0
SUM OF ALL RESPONSES TO PHQ QUESTIONS 1-9: 0

## 2023-10-10 ASSESSMENT — ENCOUNTER SYMPTOMS
ABDOMINAL PAIN: 0
SHORTNESS OF BREATH: 0
NAUSEA: 0
VOMITING: 0
RHINORRHEA: 0
CONSTIPATION: 0
DIARRHEA: 0
SORE THROAT: 0

## 2023-10-10 NOTE — PATIENT INSTRUCTIONS
Thank you for letting us take care of you today. We hope all your questions were addressed. If a question was overlooked or something else comes to mind after you return home, please contact a member of your Care Team listed below. Your Care Team at [facility] is Team #4  Eddie Sullivan (Faculty)  Cindy Meade (Resident)  Ted Penny (Resident)  Arturo Segovia (Resident)  Shantal Zepeda (Resident)  Beatriz Barraza (Resident)  Rafa Coleman, [address] [facility], [facility]  Yahir Rincon, [address] [facility], [facility]  Cj Partida, [facility]  Kael Raphael, [facility]  Rhina Aragon, [facility]  Mey Kumar, [facility]  Shahana Courtney) [place], [place] (Clinical Practice Manager)  Ashley Duvall [facility] (Clinical Pharmacist)       Office phone number: [phone]    If you need to get in right away due to illness, please be advised we have \"Same Day\" appointments available Monday-Friday. Please call us at [phone] option #3 to schedule your \"Same Day\" appointment.

## 2023-10-10 NOTE — PROGRESS NOTES
Attending Physician Statement  I have discussed the care of Rusty Miller, including pertinent history and exam findings,  with the resident. I have seen and examined the patient and the key elements of all parts of the encounter have been performed by me. I agree with the assessment, plan and orders as documented by the resident.   (Juan Hull)    Kaylen Valentin MD
Visit Information    Have you changed or started any medications since your last visit including any over-the-counter medicines, vitamins, or herbal medicines? no   Have you stopped taking any of your medications? Is so, why? -  no  Are you having any side effects from any of your medications? - no    Have you seen any other physician or provider since your last visit?  no   Have you had any other diagnostic tests since your last visit? yes - Labs   Have you been seen in the emergency room and/or had an admission in a hospital since we last saw you?  yes - Evelin Mcgraw   Have you had your routine dental cleaning in the past 6 months?  no     Do you have an active MyChart account? If no, what is the barrier?   Yes    Patient Care Team:  Ayush Cota MD as PCP - General (Family Medicine)  Albert De Leon MD as Surgeon (Cardiothoracic Surgery)  Norah Bobo MD as Consulting Physician (Pulmonology)  [address] History Review  Past Medical, Family, and Social History reviewed and does not contribute to the patient presenting condition    Health Maintenance   Topic Date Due    Hepatitis B vaccine (1 of 3 - 3-dose series) Never done    Annual Wellness Visit (AWV)  12/14/2022    Low dose CT lung screening &/or counseling  10/19/2023    COVID-19 Vaccine (4 - Pfizer series) 11/24/2023    DTaP/Tdap/Td vaccine (2 - Td or Tdap) 04/30/2024    Lipids  06/16/2024    Depression Screen  06/16/2024    Breast cancer screen  12/20/2024    Colorectal Cancer Screen  07/14/2026    Flu vaccine  Completed    Shingles vaccine  Completed    Pneumococcal 0-64 years Vaccine  Completed    Hepatitis C screen  Completed    HIV screen  Completed    Hepatitis A vaccine  Aged Out    Hib vaccine  Aged Out    Meningococcal (ACWY) vaccine  Aged Out
Visit Information    Have you changed or started any medications since your last visit including any over-the-counter medicines, vitamins, or herbal medicines? no   Have you stopped taking any of your medications? Is so, why? -  no  Are you having any side effects from any of your medications? - no    Have you seen any other physician or provider since your last visit? yes - Pulmonologist Dr. Piyush Arora    Have you had any other diagnostic tests since your last visit?  no   Have you been seen in the emergency room and/or had an admission in a hospital since we last saw you?  no   Have you had your routine dental cleaning in the past 6 months?  09/2023    Do you have an active MyChart account? If no, what is the barrier?   Yes    Patient Care Team:  Mabel Block MD as PCP - General (Family Medicine)  Teri Price MD as Surgeon (Cardiothoracic Surgery)  Ricardo Gomez MD as Consulting Physician (Pulmonology)  [address] History Review  Past Medical, Family, and Social History reviewed and does not contribute to the patient presenting condition    Health Maintenance   Topic Date Due    Hepatitis B vaccine (1 of 3 - 3-dose series) Never done    Annual Wellness Visit (AWV)  12/14/2022    Low dose CT lung screening &/or counseling  10/19/2023    COVID-19 Vaccine (4 - Pfizer series) 11/24/2023    DTaP/Tdap/Td vaccine (2 - Td or Tdap) 04/30/2024    Lipids  06/16/2024    Depression Screen  06/16/2024    Breast cancer screen  12/20/2024    Colorectal Cancer Screen  07/14/2026    Flu vaccine  Completed    Shingles vaccine  Completed    Pneumococcal 0-64 years Vaccine  Completed    Hepatitis C screen  Completed    HIV screen  Completed    Hepatitis A vaccine  Aged Out    Hib vaccine  Aged Out    Meningococcal (ACWY) vaccine  Aged Out
omeprazole (PRILOSEC) 40 MG delayed release capsule; Take 1 capsule by mouth daily  Dispense: 30 capsule; Refill: 3  - [facility], Darleen    3. Healthcare maintenance  - Hepatitis B Surface Antibody; Future      Requested Prescriptions     Signed Prescriptions Disp Refills    atorvastatin (LIPITOR) 20 MG tablet 120 tablet 3     Sig: Take 1 tablet by mouth daily    omeprazole (PRILOSEC) 40 MG delayed release capsule 30 capsule 3     Sig: Take 1 capsule by mouth daily       Medications Discontinued During This Encounter   Medication Reason    atorvastatin (LIPITOR) 20 MG tablet REORDER    omeprazole (PRILOSEC) 40 MG delayed release capsule REORDER       Laurel received counseling on the following healthy behaviors: nutrition, exercise and medication adherence    Discussed use,benefit, and side effects of prescribed medications. Barriers to medication compliance addressed. All patient questions answered. Pt voiced understanding. No follow-ups on file. Disclaimer: Some orall of this note was transcribed using voice-recognition software. This may cause typographical errors occasionally. Although all effort is made to fix these errors, please do not hesitate to contact our office if there Kristy Burnett concern with the understanding of this note.

## 2023-10-23 ENCOUNTER — HOSPITAL ENCOUNTER (OUTPATIENT)
Age: 58
Discharge: HOME | End: 2023-10-25
Payer: MEDICARE

## 2023-10-23 DIAGNOSIS — Z87.891 PERSONAL HISTORY OF TOBACCO USE: ICD-10-CM

## 2023-10-23 PROCEDURE — 71271 CT THORAX LUNG CANCER SCR C-: CPT

## 2023-10-31 ENCOUNTER — OFFICE VISIT (OUTPATIENT)
Dept: PULMONOLOGY | Age: 58
End: 2023-10-31
Payer: MEDICARE

## 2023-10-31 VITALS
WEIGHT: 134 LBS | RESPIRATION RATE: 14 BRPM | OXYGEN SATURATION: 98 % | DIASTOLIC BLOOD PRESSURE: 76 MMHG | SYSTOLIC BLOOD PRESSURE: 107 MMHG | HEART RATE: 82 BPM | BODY MASS INDEX: 22.33 KG/M2 | HEIGHT: 65 IN

## 2023-10-31 DIAGNOSIS — J44.9 CHRONIC OBSTRUCTIVE PULMONARY DISEASE, UNSPECIFIED COPD TYPE (HCC): Primary | ICD-10-CM

## 2023-10-31 DIAGNOSIS — J45.30 MILD PERSISTENT ASTHMA WITHOUT COMPLICATION: ICD-10-CM

## 2023-10-31 DIAGNOSIS — Z87.891 HISTORY OF SMOKING AT LEAST 1 PACK PER DAY FOR AT LEAST 30 YEARS: ICD-10-CM

## 2023-10-31 DIAGNOSIS — R91.1 LUNG NODULE: ICD-10-CM

## 2023-10-31 PROCEDURE — 3023F SPIROM DOC REV: CPT | Performed by: INTERNAL MEDICINE

## 2023-10-31 PROCEDURE — G8482 FLU IMMUNIZE ORDER/ADMIN: HCPCS | Performed by: INTERNAL MEDICINE

## 2023-10-31 PROCEDURE — G8427 DOCREV CUR MEDS BY ELIG CLIN: HCPCS | Performed by: INTERNAL MEDICINE

## 2023-10-31 PROCEDURE — 3017F COLORECTAL CA SCREEN DOC REV: CPT | Performed by: INTERNAL MEDICINE

## 2023-10-31 PROCEDURE — 99214 OFFICE O/P EST MOD 30 MIN: CPT | Performed by: INTERNAL MEDICINE

## 2023-10-31 PROCEDURE — 1036F TOBACCO NON-USER: CPT | Performed by: INTERNAL MEDICINE

## 2023-10-31 PROCEDURE — G8420 CALC BMI NORM PARAMETERS: HCPCS | Performed by: INTERNAL MEDICINE

## 2023-10-31 RX ORDER — UMECLIDINIUM 62.5 UG/1
1 AEROSOL, POWDER ORAL DAILY
Qty: 30 EACH | Refills: 11 | Status: SHIPPED | OUTPATIENT
Start: 2023-10-31

## 2023-10-31 RX ORDER — FLUTICASONE FUROATE AND VILANTEROL 200; 25 UG/1; UG/1
1 POWDER RESPIRATORY (INHALATION) DAILY
Qty: 30 EACH | Refills: 11 | Status: SHIPPED | OUTPATIENT
Start: 2023-10-31

## 2024-01-08 ENCOUNTER — TELEPHONE (OUTPATIENT)
Dept: GASTROENTEROLOGY | Age: 59
End: 2024-01-08

## 2024-02-07 DIAGNOSIS — L70.0 ACNE VULGARIS: ICD-10-CM

## 2024-02-11 RX ORDER — TRETINOIN 0.5 MG/G
CREAM TOPICAL
Qty: 45 G | Refills: 3 | Status: SHIPPED | OUTPATIENT
Start: 2024-02-11

## 2024-03-03 DIAGNOSIS — K22.70 BARRETT'S ESOPHAGUS WITHOUT DYSPLASIA: ICD-10-CM

## 2024-03-04 RX ORDER — OMEPRAZOLE 40 MG/1
40 CAPSULE, DELAYED RELEASE ORAL DAILY
Qty: 30 CAPSULE | Refills: 3 | Status: SHIPPED | OUTPATIENT
Start: 2024-03-04

## 2024-03-04 NOTE — TELEPHONE ENCOUNTER
Smoking     Hypoxia     Empyema (HCC)     GERD (gastroesophageal reflux disease)     H/O chronic respiratory failure     COPD (chronic obstructive pulmonary disease) (HCC)     Orthostatic hypotension     Medication refill     Seasonal allergies     Acne comedone     Bunion     Viral URI     Hyperlipidemia     Constipation     Ear fullness, right     Bulimia nervosa, purging type     Gastroesophageal reflux disease with esophagitis     Chronic bilateral low back pain without sciatica     Gingivitis     Polyuria     Urinary frequency     Hallux abducto valgus, right     Pain in right foot     Anaphylactic syndrome

## 2024-03-14 ENCOUNTER — OFFICE VISIT (OUTPATIENT)
Dept: GASTROENTEROLOGY | Age: 59
End: 2024-03-14

## 2024-03-14 VITALS
HEART RATE: 73 BPM | SYSTOLIC BLOOD PRESSURE: 103 MMHG | HEIGHT: 65 IN | DIASTOLIC BLOOD PRESSURE: 71 MMHG | BODY MASS INDEX: 23.59 KG/M2 | WEIGHT: 141.6 LBS

## 2024-03-14 DIAGNOSIS — K21.9 GASTROESOPHAGEAL REFLUX DISEASE WITHOUT ESOPHAGITIS: ICD-10-CM

## 2024-03-14 DIAGNOSIS — Z12.11 SCREEN FOR COLON CANCER: Primary | ICD-10-CM

## 2024-03-14 DIAGNOSIS — F50.2 BULIMIA NERVOSA, PURGING TYPE: ICD-10-CM

## 2024-03-14 DIAGNOSIS — K22.70 BARRETT'S ESOPHAGUS WITHOUT DYSPLASIA: ICD-10-CM

## 2024-03-14 RX ORDER — POLYETHYLENE GLYCOL 3350 17 G/17G
POWDER, FOR SOLUTION ORAL
Qty: 238 G | Refills: 0 | Status: SHIPPED | OUTPATIENT
Start: 2024-03-14

## 2024-03-14 RX ORDER — BISACODYL 5 MG/1
TABLET, DELAYED RELEASE ORAL
Qty: 4 TABLET | Refills: 0 | Status: SHIPPED | OUTPATIENT
Start: 2024-03-14

## 2024-03-14 ASSESSMENT — ENCOUNTER SYMPTOMS
ANAL BLEEDING: 0
CONSTIPATION: 0
NAUSEA: 0
BACK PAIN: 0
DIARRHEA: 0
BLOOD IN STOOL: 0
SORE THROAT: 1
VOMITING: 0
ABDOMINAL DISTENTION: 0
TROUBLE SWALLOWING: 0
WHEEZING: 0
CHOKING: 0
SINUS PRESSURE: 0
RECTAL PAIN: 0
VOICE CHANGE: 0
COUGH: 1
ABDOMINAL PAIN: 0

## 2024-03-14 NOTE — PROGRESS NOTES
answered  The procedure has been scheduled with the  in the office  Patient was asked to give us a call for any questions  The patient has verbalized understanding and agreement to this plan.         Spent 30 minutes providing patient education and counseling.     Thank you for allowing me to participate in the care of Ms. Harley. For any further questions please do not hesitate to contact me.    Note is dictated utilizing voice recognition software. Unfortunately this leads to occasional typographical errors. Please contact our office if you have any questions.    I have reviewed and agree with the MA/HAWKN ROS.     Abdiaziz Nolasco MD, FACP, FACG  Board Certified in Gastroenterology and Internal Medicine  [facility]  Office #: [phone]

## 2024-03-26 ENCOUNTER — TELEPHONE (OUTPATIENT)
Dept: FAMILY MEDICINE CLINIC | Age: 59
End: 2024-03-26

## 2024-03-26 NOTE — TELEPHONE ENCOUNTER
LVM for a return call to reschedule patient's appointment from 04/25/2024 due to the provider not in clinic that day .

## 2024-04-11 ENCOUNTER — HOSPITAL ENCOUNTER (OUTPATIENT)
Age: 59
Setting detail: OUTPATIENT SURGERY
Discharge: HOME | End: 2024-04-11
Attending: INTERNAL MEDICINE | Admitting: INTERNAL MEDICINE
Payer: MEDICARE

## 2024-04-11 ENCOUNTER — ANESTHESIA (OUTPATIENT)
Dept: OPERATING ROOM | Age: 59
End: 2024-04-11
Payer: MEDICARE

## 2024-04-11 ENCOUNTER — ANESTHESIA EVENT (OUTPATIENT)
Dept: OPERATING ROOM | Age: 59
End: 2024-04-11
Payer: MEDICARE

## 2024-04-11 VITALS
RESPIRATION RATE: 13 BRPM | TEMPERATURE: 97.3 F | DIASTOLIC BLOOD PRESSURE: 60 MMHG | WEIGHT: 137.9 LBS | HEART RATE: 66 BPM | SYSTOLIC BLOOD PRESSURE: 111 MMHG | OXYGEN SATURATION: 99 % | BODY MASS INDEX: 22.16 KG/M2 | HEIGHT: 66 IN

## 2024-04-11 DIAGNOSIS — Z12.11 SCREEN FOR COLON CANCER: ICD-10-CM

## 2024-04-11 DIAGNOSIS — F50.2 BULIMIA NERVOSA: ICD-10-CM

## 2024-04-11 DIAGNOSIS — K21.9 GASTROESOPHAGEAL REFLUX DISEASE WITHOUT ESOPHAGITIS: ICD-10-CM

## 2024-04-11 DIAGNOSIS — K22.70 BARRETT'S ESOPHAGUS WITHOUT DYSPLASIA: ICD-10-CM

## 2024-04-11 PROCEDURE — 3609027000 HC COLONOSCOPY: Performed by: INTERNAL MEDICINE

## 2024-04-11 PROCEDURE — 3700000000 HC ANESTHESIA ATTENDED CARE: Performed by: INTERNAL MEDICINE

## 2024-04-11 PROCEDURE — 3700000001 HC ADD 15 MINUTES (ANESTHESIA): Performed by: INTERNAL MEDICINE

## 2024-04-11 PROCEDURE — 3609012400 HC EGD TRANSORAL BIOPSY SINGLE/MULTIPLE: Performed by: INTERNAL MEDICINE

## 2024-04-11 PROCEDURE — 2580000003 HC RX 258: Performed by: ANESTHESIOLOGY

## 2024-04-11 PROCEDURE — 6360000002 HC RX W HCPCS: Performed by: SPECIALIST

## 2024-04-11 PROCEDURE — 7100000011 HC PHASE II RECOVERY - ADDTL 15 MIN: Performed by: INTERNAL MEDICINE

## 2024-04-11 PROCEDURE — 7100000010 HC PHASE II RECOVERY - FIRST 15 MIN: Performed by: INTERNAL MEDICINE

## 2024-04-11 PROCEDURE — 2709999900 HC NON-CHARGEABLE SUPPLY: Performed by: INTERNAL MEDICINE

## 2024-04-11 PROCEDURE — 88305 TISSUE EXAM BY PATHOLOGIST: CPT

## 2024-04-11 PROCEDURE — 2500000003 HC RX 250 WO HCPCS: Performed by: SPECIALIST

## 2024-04-11 RX ORDER — SODIUM CHLORIDE 0.9 % (FLUSH) 0.9 %
5-40 SYRINGE (ML) INJECTION EVERY 12 HOURS SCHEDULED
Status: CANCELLED | OUTPATIENT
Start: 2024-04-11

## 2024-04-11 RX ORDER — NALOXONE HYDROCHLORIDE 0.4 MG/ML
INJECTION, SOLUTION INTRAMUSCULAR; INTRAVENOUS; SUBCUTANEOUS PRN
Status: CANCELLED | OUTPATIENT
Start: 2024-04-11

## 2024-04-11 RX ORDER — SODIUM CHLORIDE 0.9 % (FLUSH) 0.9 %
5-40 SYRINGE (ML) INJECTION EVERY 12 HOURS SCHEDULED
Status: DISCONTINUED | OUTPATIENT
Start: 2024-04-11 | End: 2024-04-11 | Stop reason: HOSPADM

## 2024-04-11 RX ORDER — SODIUM CHLORIDE 0.9 % (FLUSH) 0.9 %
5-40 SYRINGE (ML) INJECTION PRN
Status: DISCONTINUED | OUTPATIENT
Start: 2024-04-11 | End: 2024-04-11 | Stop reason: HOSPADM

## 2024-04-11 RX ORDER — SODIUM CHLORIDE 0.9 % (FLUSH) 0.9 %
5-40 SYRINGE (ML) INJECTION PRN
Status: CANCELLED | OUTPATIENT
Start: 2024-04-11

## 2024-04-11 RX ORDER — FENTANYL CITRATE 50 UG/ML
25 INJECTION, SOLUTION INTRAMUSCULAR; INTRAVENOUS EVERY 5 MIN PRN
Status: CANCELLED | OUTPATIENT
Start: 2024-04-11

## 2024-04-11 RX ORDER — SODIUM CHLORIDE 9 MG/ML
INJECTION, SOLUTION INTRAVENOUS CONTINUOUS
Status: DISCONTINUED | OUTPATIENT
Start: 2024-04-11 | End: 2024-04-11

## 2024-04-11 RX ORDER — SODIUM CHLORIDE 9 MG/ML
INJECTION, SOLUTION INTRAVENOUS PRN
Status: CANCELLED | OUTPATIENT
Start: 2024-04-11

## 2024-04-11 RX ORDER — SODIUM CHLORIDE, SODIUM LACTATE, POTASSIUM CHLORIDE, CALCIUM CHLORIDE 600; 310; 30; 20 MG/100ML; MG/100ML; MG/100ML; MG/100ML
INJECTION, SOLUTION INTRAVENOUS CONTINUOUS
Status: DISCONTINUED | OUTPATIENT
Start: 2024-04-11 | End: 2024-04-11 | Stop reason: HOSPADM

## 2024-04-11 RX ORDER — PROPOFOL 10 MG/ML
INJECTION, EMULSION INTRAVENOUS PRN
Status: DISCONTINUED | OUTPATIENT
Start: 2024-04-11 | End: 2024-04-11 | Stop reason: SDUPTHER

## 2024-04-11 RX ORDER — SODIUM CHLORIDE 9 MG/ML
INJECTION, SOLUTION INTRAVENOUS PRN
Status: DISCONTINUED | OUTPATIENT
Start: 2024-04-11 | End: 2024-04-11 | Stop reason: HOSPADM

## 2024-04-11 RX ORDER — LIDOCAINE HYDROCHLORIDE 20 MG/ML
INJECTION, SOLUTION EPIDURAL; INFILTRATION; INTRACAUDAL; PERINEURAL PRN
Status: DISCONTINUED | OUTPATIENT
Start: 2024-04-11 | End: 2024-04-11 | Stop reason: SDUPTHER

## 2024-04-11 RX ORDER — ONDANSETRON 2 MG/ML
4 INJECTION INTRAMUSCULAR; INTRAVENOUS
Status: CANCELLED | OUTPATIENT
Start: 2024-04-11 | End: 2024-04-12

## 2024-04-11 RX ORDER — HYDROMORPHONE HYDROCHLORIDE 1 MG/ML
0.5 INJECTION, SOLUTION INTRAMUSCULAR; INTRAVENOUS; SUBCUTANEOUS EVERY 5 MIN PRN
Status: CANCELLED | OUTPATIENT
Start: 2024-04-11

## 2024-04-11 RX ORDER — LIDOCAINE HYDROCHLORIDE 10 MG/ML
1 INJECTION, SOLUTION EPIDURAL; INFILTRATION; INTRACAUDAL; PERINEURAL
Status: DISCONTINUED | OUTPATIENT
Start: 2024-04-11 | End: 2024-04-11 | Stop reason: HOSPADM

## 2024-04-11 RX ADMIN — PROPOFOL 50 MG: 10 INJECTION, EMULSION INTRAVENOUS at 10:06

## 2024-04-11 RX ADMIN — PROPOFOL 50 MG: 10 INJECTION, EMULSION INTRAVENOUS at 09:44

## 2024-04-11 RX ADMIN — PROPOFOL 30 MG: 10 INJECTION, EMULSION INTRAVENOUS at 10:08

## 2024-04-11 RX ADMIN — PROPOFOL 100 MG: 10 INJECTION, EMULSION INTRAVENOUS at 09:40

## 2024-04-11 RX ADMIN — PROPOFOL 50 MG: 10 INJECTION, EMULSION INTRAVENOUS at 09:54

## 2024-04-11 RX ADMIN — LIDOCAINE HYDROCHLORIDE 100 MG: 20 INJECTION, SOLUTION EPIDURAL; INFILTRATION; INTRACAUDAL; PERINEURAL at 09:34

## 2024-04-11 RX ADMIN — PROPOFOL 50 MG: 10 INJECTION, EMULSION INTRAVENOUS at 09:50

## 2024-04-11 RX ADMIN — SODIUM CHLORIDE, POTASSIUM CHLORIDE, SODIUM LACTATE AND CALCIUM CHLORIDE: 600; 310; 30; 20 INJECTION, SOLUTION INTRAVENOUS at 08:44

## 2024-04-11 RX ADMIN — PROPOFOL 150 MG: 10 INJECTION, EMULSION INTRAVENOUS at 09:34

## 2024-04-11 RX ADMIN — PROPOFOL 50 MG: 10 INJECTION, EMULSION INTRAVENOUS at 10:02

## 2024-04-11 RX ADMIN — PROPOFOL 50 MG: 10 INJECTION, EMULSION INTRAVENOUS at 09:48

## 2024-04-11 RX ADMIN — PROPOFOL 50 MG: 10 INJECTION, EMULSION INTRAVENOUS at 09:58

## 2024-04-11 ASSESSMENT — LIFESTYLE VARIABLES: SMOKING_STATUS: 0

## 2024-04-11 ASSESSMENT — PAIN - FUNCTIONAL ASSESSMENT
PAIN_FUNCTIONAL_ASSESSMENT: 0-10
PAIN_FUNCTIONAL_ASSESSMENT: NONE - DENIES PAIN

## 2024-04-11 NOTE — H&P
Interval H&P Note    Pt Name: Laurel Harley  MRN: [id number]  YOB: 1965  Date of evaluation: 4/11/2024      [x] I have reviewed in [facility] the Gastroenterology Progress note by Khadar Lentz CNP dated 3/14/24 attached below for the Interval History and Physical note.     [x] I have examined  Laurel Harley, a 58 y.o. female with known comorbidities managed by PCP, Dr Nicole Carver and specialists who presents for her scheduled ESOPHAGOGASTRODUODENOSCOPY, COLONOSCOPY DIAGNOSTIC by Abdiaziz Nolasco MD for Screen for colon cancer; Vargas's esophagus without dysplasia; Gastroesop*. Patient arrived for her procedures.Patient returns for recall EGD for Vargas's esophagus  Patient followed bowel prep until watery yellowish   Previous  colonoscopy 10 years ago.Brother has H/o colon polyps in surveillance.  Patient bowel changes, bloody tarry stools, diarrhea alternating with constipation, abdominal pain or unintentional weight loss.     Patient has  known late COPD, moderate persistent asthma and early emphysema. Patient stopped smoking 2-014 followed by Pulmonologist, Dr Zhao every 6 months Used Breo and Incruse Ellipta today Has inhaler with her  The patient denies new health changes, fever, chills, wheezing, cough, increased SOB, chest pain, open sores or wounds.    Vital signs: /86   Pulse 78   Temp 97 °F (36.1 °C)   Resp 16   Ht 1.676 m (5' 6\")   Wt 62.6 kg (137 lb 14.4 oz)   SpO2 97%   BMI 22.26 kg/m²     Allergies:  Sesame [oleum sesami] and Codeine    Medications:    Prior to Admission medications    Medication Sig Start Date End Date Taking? Authorizing Provider   polyethylene glycol (GLYCOLAX) 17 GM/SCOOP powder Please follow instructions provided to you by your provider. 3/14/24   Khadar Lentz APRN - NP   bisacodyl 5 MG EC tablet Please follow instructions given to you by your provider. 3/14/24   Khadar Lentz APRN - NP   omeprazole (PRILOSEC) 40 MG delayed  tobacco: Never   Vaping Use    Vaping Use: Never used   Substance and Sexual Activity    Alcohol use: Not Currently     Comment: quit 2/06/2214    Drug use: No     Types: Cocaine     Comment: past hx of cocaine, marijuana, denies any IVDA    Sexual activity: Never   Other Topics Concern    Not on file   Social History Narrative    Not on file     Social Determinants of Health     Financial Resource Strain: Low Risk  (6/16/2023)    Overall Financial Resource Strain (CARDIA)     Difficulty of Paying Living Expenses: Not hard at all   Food Insecurity: Not on file (6/16/2023)   Transportation Needs: Unknown (6/16/2023)    PRAPARE - Transportation     Lack of Transportation (Medical): Not on file     Lack of Transportation (Non-Medical): No   Physical Activity: Not on file   Stress: Not on file   Social Connections: Not on file   Intimate Partner Violence: Not on file   Housing Stability: Unknown (6/16/2023)    Housing Stability Vital Sign     Unable to Pay for Housing in the Last Year: Not on file     Number of Places Lived in the Last Year: Not on file     Unstable Housing in the Last Year: No       REVIEW OF SYSTEMS:       Review of Systems   Constitutional:  Negative for appetite change, fatigue and unexpected weight change.   HENT:  Positive for sore throat. Negative for dental problem, postnasal drip, sinus pressure, trouble swallowing and voice change.    Eyes:  Negative for visual disturbance.   Respiratory:  Positive for cough. Negative for choking and wheezing.    Cardiovascular:  Negative for chest pain, palpitations and leg swelling.   Gastrointestinal:  Negative for abdominal distention, abdominal pain, anal bleeding, blood in stool, constipation, diarrhea, nausea, rectal pain and vomiting.   Genitourinary:  Negative for difficulty urinating.   Musculoskeletal:  Negative for arthralgias, back pain, gait problem and myalgias.   Allergic/Immunologic: Negative for environmental allergies and food allergies.

## 2024-04-11 NOTE — ANESTHESIA POSTPROCEDURE EVALUATION
Department of Anesthesiology  Postprocedure Note    Patient: Laurel Harley  MRN: [id number]  YOB: 1965  Date of evaluation: 4/11/2024    Procedure Summary       Date: 04/11/24 Room / Location: [address]    Anesthesia Start: 0932 Anesthesia Stop: 1016    Procedures:       ESOPHAGOGASTRODUODENOSCOPY BIOPSY      COLONOSCOPY DIAGNOSTIC Diagnosis:       Screen for colon cancer      Vargas's esophagus without dysplasia      Gastroesophageal reflux disease without esophagitis      Bulimia nervosa      (Screen for colon cancer [Z12.11])      (Vargas's esophagus without dysplasia [K22.70])      (Gastroesophageal reflux disease without esophagitis [K21.9])      (Bulimia nervosa [F50.2])    Surgeons: Abdiaziz Nolasco MD Responsible Provider: Asaf Wilburn MD    Anesthesia Type: general, MAC, TIVA ASA Status: 2            Anesthesia Type: No value filed.    Humaira Phase I: Humaira Score: 10    Humaira Phase II: Humaira Score: 10    Anesthesia Post Evaluation    Patient location during evaluation: PACU  Patient participation: complete - patient participated  Level of consciousness: awake  Airway patency: patent  Nausea & Vomiting: no nausea  Cardiovascular status: blood pressure returned to baseline  Respiratory status: acceptable  Hydration status: euvolemic  Comments: Multimodal analgesia pain management as indicated by procedure  Multimodal analgesia pain management approach  Pain management: adequate    No notable events documented.

## 2024-04-11 NOTE — OP NOTE
EGD PROCEDURE NOTE    DATE OF PROCEDURE: 4/11/2024     SURGEON: Abdiaziz Nolasco MD  ASSISTANT: None    PREOPERATIVE DIAGNOSIS Vargas's surveillance.    POSTOPERATIVE DIAGNOSIS: Hiatal hernia, islands of Vargas's mucosa, gastric polyps    OPERATION: Upper GI endoscopy with Biopsy    ANESTHESIA: MAC.    ESTIMATED BLOOD LOSS: Less than 50 ml    COMPLICATIONS: None.     SPECIMENS:  Was Obtained: Gastric polyps to check histology of the polyp    Biopsies from the Vargas's mucosa to check for dysplasia    HISTORY: The patient is a 58 y.o. year old female with history of above preop diagnosis.  I recommended esophagogastroduodenoscopy with possible biopsy and I explained the risk, benefits, expected outcome, and alternatives to the procedure.  Risks included but are not limited to bleeding, infection, respiratory distress, hypotension, and perforation of the esophagus, stomach, or duodenum.  Patient understands and is in agreement.      The patient was counseled at length about the risks of dash Covid-19 during their perioperative period and any recovery window from their procedure.  The patient was made aware that dash Covid-19  may worsen their prognosis for recovering from their procedure  and lend to a higher morbidity and/or mortality risk.  All material risks, benefits, and reasonable alternatives including postponing the procedure were discussed. The patient does wish to proceed with the procedure at this time.         PROCEDURE: The patient was given IV conscious sedation.  The patient's SPO2 remained above 90% throughout the procedure.The gastroscope was inserted orally and advanced under direct vision through the esophagus, through the stomach, through the pylorus, and into the descending duodenum.      Post sedation note :The patient's SPO2 remained above 90% throughout the procedure.the vital signs remained stable , and no immediate complication form the procedure noted, patient will be

## 2024-04-11 NOTE — ANESTHESIA PRE PROCEDURE
Department of Anesthesiology  Preprocedure Note       Name:  Laurel Harley   Age:  58 y.o.  :  1965                                          MRN:  [id number]         Date:  2024      Surgeon: Surgeon(s):  Abdiaziz Nolasco MD    Procedure: Procedure(s):  ESOPHAGOGASTRODUODENOSCOPY  COLONOSCOPY DIAGNOSTIC    Medications prior to admission:   Prior to Admission medications    Medication Sig Start Date End Date Taking? Authorizing Provider   polyethylene glycol (GLYCOLAX) 17 GM/SCOOP powder Please follow instructions provided to you by your provider. 3/14/24   Khadar Lentz APRN - NP   bisacodyl 5 MG EC tablet Please follow instructions given to you by your provider. 3/14/24   Khadar Lentz APRN - NP   omeprazole (PRILOSEC) 40 MG delayed release capsule take 1 capsule by mouth once daily 3/4/24   Nicole Carver MD   tretinoin (RETIN-A) 0.05 % cream Apply a pea sized amount to face nightly 24   Gaudencio Gorman PA-C   fluticasone furoate-vilanterol (BREO ELLIPTA) 200-25 MCG/ACT AEPB inhaler Inhale 1 puff into the lungs daily 10/31/23   Joseph Zhao MD   umeclidinium bromide (INCRUSE ELLIPTA) 62.5 MCG/ACT inhaler Inhale 1 puff into the lungs daily 10/31/23   Joseph Zhao MD   atorvastatin (LIPITOR) 20 MG tablet Take 1 tablet by mouth daily 10/10/23   Nicole Carver MD   EPINEPHrine (EPIPEN 2-CRUZ) 0.3 MG/0.3ML SOAJ injection Inject 0.3 mLs into the muscle once for 1 dose Use as directed for allergic reaction 23  Carol Espino DO   EPINEPHrine (EPIPEN 2-CRUZ) 0.3 MG/0.3ML SOAJ injection Use as directed for allergic reaction 23   Champ Treviño MD   diphenhydrAMINE (BENADRYL) 25 MG capsule Take 2 capsules by mouth every 6 hours as needed for Itching 23   Champ Treviño MD   TRINTELLIX 10 MG TABS tablet  23   Provider, MD Jojo   albuterol sulfate HFA (VENTOLIN HFA) 108 (90 Base) MCG/ACT inhaler Inhale 2 puffs into the lungs every 6 hours

## 2024-04-11 NOTE — OP NOTE
COLONOSCOPY    DATE OF PROCEDURE: 4/11/2024    SURGEON: Abdiaziz Nolasco MD    ASSISTANT: None    PREOPERATIVE DIAGNOSIS: Screening colonoscopy, history of polyps removed about 10 years ago    POSTOPERATIVE DIAGNOSIS: Hemorrhoids.  Inadequate preparation    OPERATION: Total colonoscopy     ANESTHESIA: MAC    ESTIMATED BLOOD LOSS: None    COMPLICATIONS: None     SPECIMENS:  Was Not Obtained    HISTORY: The patient is a 58 y.o. year old female with history of above preop diagnosis.  I recommended colonoscopy with possible biopsy or polypectomy and I explained the risk, benefits, expected outcome, and alternatives to the procedure.  Risks included but are not limited to bleeding, infection, respiratory distress, hypotension, and perforation of the colon and possibility of missing a lesion.  The patient understands and is in agreement.      PROCEDURE:  The patient's SPO2 remained above 90% throughout the procedure. Digital rectal exam was normal.  The colonoscope was inserted through the anus into the rectum and advanced under direct vision to the cecum without difficulty.  Terminal ileum was examined for approximately 2 inches.  The prep was poor.      Findings:    From the anus up to the cecum examined.  Also couple of inches of ileum examined.    Patient has inadequate preparation.  Scope was plugging up frequently.  She was expelling large amount of liquid stool onto the examining table.  In some areas of the colon more than 50% of the lumen could not be visualized.  Cecal base was filled with semiformed stools.  Significant lesions can be missed during this exam.    Anorectal area examined in the retroflexed view and was found to have hemorrhoids.            Withdrawal Time was (minutes): 15          The colon was decompressed and the scope was removed.  The patient tolerated the procedure without unusual events.     During the procedure, the patient's blood pressure, pulse and oxygen saturation remained stable

## 2024-04-13 ENCOUNTER — HOSPITAL ENCOUNTER (EMERGENCY)
Age: 59
Discharge: HOME | End: 2024-04-13
Attending: STUDENT IN AN ORGANIZED HEALTH CARE EDUCATION/TRAINING PROGRAM
Payer: MEDICARE

## 2024-04-13 VITALS
TEMPERATURE: 98.1 F | HEART RATE: 78 BPM | DIASTOLIC BLOOD PRESSURE: 87 MMHG | OXYGEN SATURATION: 99 % | RESPIRATION RATE: 13 BRPM | SYSTOLIC BLOOD PRESSURE: 130 MMHG

## 2024-04-13 DIAGNOSIS — Z76.0 MEDICATION REFILL: ICD-10-CM

## 2024-04-13 DIAGNOSIS — T78.2XXS ANAPHYLAXIS, SEQUELA: ICD-10-CM

## 2024-04-13 DIAGNOSIS — T78.2XXA ANAPHYLAXIS, INITIAL ENCOUNTER: Primary | ICD-10-CM

## 2024-04-13 PROCEDURE — 99284 EMERGENCY DEPT VISIT MOD MDM: CPT

## 2024-04-13 PROCEDURE — 2580000003 HC RX 258: Performed by: STUDENT IN AN ORGANIZED HEALTH CARE EDUCATION/TRAINING PROGRAM

## 2024-04-13 PROCEDURE — 96374 THER/PROPH/DIAG INJ IV PUSH: CPT

## 2024-04-13 PROCEDURE — 6360000002 HC RX W HCPCS: Performed by: STUDENT IN AN ORGANIZED HEALTH CARE EDUCATION/TRAINING PROGRAM

## 2024-04-13 RX ORDER — PREDNISONE 10 MG/1
TABLET ORAL
Qty: 20 TABLET | Refills: 0 | Status: SHIPPED | OUTPATIENT
Start: 2024-04-13

## 2024-04-13 RX ORDER — EPINEPHRINE 0.3 MG/.3ML
INJECTION SUBCUTANEOUS
Qty: 2 EACH | Refills: 2 | Status: SHIPPED | OUTPATIENT
Start: 2024-04-13

## 2024-04-13 RX ADMIN — WATER 125 MG: 1 INJECTION INTRAMUSCULAR; INTRAVENOUS; SUBCUTANEOUS at 22:20

## 2024-04-13 ASSESSMENT — PAIN - FUNCTIONAL ASSESSMENT: PAIN_FUNCTIONAL_ASSESSMENT: NONE - DENIES PAIN

## 2024-04-14 LAB — SURGICAL PATHOLOGY REPORT: NORMAL

## 2024-04-14 NOTE — DISCHARGE INSTRUCTIONS
Take your medication as prescribed.  Use Benadryl 25 - 50 milligrams (1 - 2 tabs) every 6 hours for the next 24 - 48 hours (do not use if you were given a prescription for hydroxyzine).      Stop using any new medications, detergents, soaps, shampoos, hair dye or anything else that could have caused this allergic reaction.    PLEASE RETURN TO THE EMERGENCY DEPARTMENT IMMEDIATELY for worsening symptoms of the reaction, shortness of breath, wheezing, sensation of your throat is closing, or if you develop any concerning symptoms such as: high fever not relieved by acetaminophen (Tylenol) and/or ibuprofen (Motrin), chills, shortness of breath, chest pain, persistent nausea and/or vomiting, numbness, weakness or tingling in the arms or legs or change in color of the extremities, changes in mental status, persistent headache, blurry vision, unable to follow up with your physician, or other any other care or concern.

## 2024-04-14 NOTE — ED PROVIDER NOTES
5 MG EC tablet Please follow instructions given to you by your provider., Disp-4 tablet, R-0Normal      omeprazole (PRILOSEC) 40 MG delayed release capsule take 1 capsule by mouth once daily, Disp-30 capsule, R-3Normal      tretinoin (RETIN-A) 0.05 % cream Apply a pea sized amount to face nightly, Disp-45 g, R-3, Normal      fluticasone furoate-vilanterol (BREO ELLIPTA) 200-25 MCG/ACT AEPB inhaler Inhale 1 puff into the lungs daily, Disp-30 each, R-11Normal      umeclidinium bromide (INCRUSE ELLIPTA) 62.5 MCG/ACT inhaler Inhale 1 puff into the lungs daily, Disp-30 each, R-11Normal      atorvastatin (LIPITOR) 20 MG tablet Take 1 tablet by mouth daily, Disp-120 tablet, R-3Normal      diphenhydrAMINE (BENADRYL) 25 MG capsule Take 2 capsules by mouth every 6 hours as needed for Itching, Disp-30 capsule, R-0Normal      !! TRINTELLIX 10 MG TABS tablet [facility]      albuterol sulfate HFA (VENTOLIN HFA) 108 (90 Base) MCG/ACT inhaler Inhale 2 puffs into the lungs every 6 hours as needed for Wheezing or Shortness of Breath, Disp-1 each, R-11Normal      fluticasone (FLONASE) 50 MCG/ACT nasal spray 1 spray by Each Nostril route daily, Disp-32 g, R-1Normal      senna-docusate (RA P COL-RITE) 8.6-50 MG per tablet take 2 tablets by mouth once daily if needed for constipation, Disp-30 tablet, R-3Normal      !! TRINTELLIX 20 MG TABS tablet 1 tablet, R-0, DA[facility] Med       !! - Potential duplicate medications found. Please discuss with provider.          ALLERGIES     is allergic to sesame [oleum sesami] and codeine.    FAMILY HISTORY     She indicated that her mother is . She indicated that her father is . She indicated that her maternal grandmother is . She indicated that her maternal uncle is .       SOCIAL HISTORY       Social History     Tobacco Use    Smoking status: Former     Current packs/day: 0.00     Average packs/day: 1 pack/day for 36.7 years (36.7 ttl pk-yrs)     Types:

## 2024-04-14 NOTE — ED NOTES
Pt reports giving herself her first epi pen at 2030. Reports gave herself a second one shortly after.

## 2024-04-19 DIAGNOSIS — K22.70 BARRETT'S ESOPHAGUS WITHOUT DYSPLASIA: ICD-10-CM

## 2024-04-19 DIAGNOSIS — F50.2 BULIMIA NERVOSA, PURGING TYPE: ICD-10-CM

## 2024-04-19 DIAGNOSIS — Z12.11 SCREEN FOR COLON CANCER: ICD-10-CM

## 2024-04-19 DIAGNOSIS — K21.9 GASTROESOPHAGEAL REFLUX DISEASE WITHOUT ESOPHAGITIS: ICD-10-CM

## 2024-05-01 ENCOUNTER — OFFICE VISIT (OUTPATIENT)
Dept: GASTROENTEROLOGY | Age: 59
End: 2024-05-01
Payer: MEDICARE

## 2024-05-01 VITALS
SYSTOLIC BLOOD PRESSURE: 119 MMHG | HEART RATE: 75 BPM | HEIGHT: 66 IN | DIASTOLIC BLOOD PRESSURE: 70 MMHG | WEIGHT: 141 LBS | BODY MASS INDEX: 22.66 KG/M2

## 2024-05-01 DIAGNOSIS — K22.70 BARRETT'S ESOPHAGUS WITHOUT DYSPLASIA: ICD-10-CM

## 2024-05-01 PROCEDURE — G8427 DOCREV CUR MEDS BY ELIG CLIN: HCPCS | Performed by: INTERNAL MEDICINE

## 2024-05-01 PROCEDURE — 3017F COLORECTAL CA SCREEN DOC REV: CPT | Performed by: INTERNAL MEDICINE

## 2024-05-01 PROCEDURE — 1036F TOBACCO NON-USER: CPT | Performed by: INTERNAL MEDICINE

## 2024-05-01 PROCEDURE — 99213 OFFICE O/P EST LOW 20 MIN: CPT | Performed by: INTERNAL MEDICINE

## 2024-05-01 PROCEDURE — G8420 CALC BMI NORM PARAMETERS: HCPCS | Performed by: INTERNAL MEDICINE

## 2024-05-01 RX ORDER — OMEPRAZOLE 40 MG/1
40 CAPSULE, DELAYED RELEASE ORAL DAILY
Qty: 90 CAPSULE | Refills: 3 | Status: SHIPPED | OUTPATIENT
Start: 2024-05-01

## 2024-05-01 ASSESSMENT — ENCOUNTER SYMPTOMS
COUGH: 0
BLOOD IN STOOL: 0
SINUS PRESSURE: 0
DIARRHEA: 0
CONSTIPATION: 0
CHOKING: 0
BACK PAIN: 0
NAUSEA: 0
ABDOMINAL DISTENTION: 0
VOMITING: 0
RECTAL PAIN: 0
ANAL BLEEDING: 0
VOICE CHANGE: 0
ABDOMINAL PAIN: 0
TROUBLE SWALLOWING: 0
WHEEZING: 0
SORE THROAT: 0

## 2024-05-01 NOTE — PROGRESS NOTES
GI OFFICE FOLLOW UP    INTERVAL HISTORY:   No referring provider defined for this encounter.    Chief Complaint   Patient presents with    Follow Up After Procedure     Patient presents in office today to be seen for a follow up on her colonoscopy and EGD.       No diagnosis found.          HISTORY OF PRESENT ILLNESS:   Patient seen for follow-up of Vargas's mucosa.  Recently she had a EGD done that revealed islands of Vargas's mucosa, hiatal hernia and gastric polyps.  Multiple biopsies from the Vargas's mucosa did not reveal dysplasia.  Gastric polyps are benign.    She had a screening colonoscopy done, unfortunately she had inadequate preparation.  Found to have hemorrhoids.    During this visit patient denies symptoms.  She has been on PPI therapy and does not have heartburns.  No dysphagia.    Appetite and there is no weight loss.  Bowel movements satisfactory.  Past Medical,Family, and Social History reviewed and does contribute to the patient presenting condition.      Patient's PMH/PSH,SH,PSYCH Hx, MEDs, ALLERGIES, and ROS were all reviewed and updated in the appropriate sections. Yes      PAST MEDICAL HISTORY:  Past Medical History:   Diagnosis Date    Acne     Dr. [facility]. annual visits    Vargas's esophagus     for 20 yrs on and off. [facility]    Bipolar disorder ([facility])     recently diagnosed and placed on meds. No longer an issue since sobriety    Bunion of great toe of left foot     Bunion of great toe of right foot     Dr. Padilla CPM    Chronic obstructive pulmonary disease (COPD) ([facility])     Dr. Zhao Pulmonology. last visit 7/2021    Depression     On Meds. Dr. Strange [facility] on [facility]    Dyspnea on exertion     Eating disorder     bulemia    Empyema ([facility]) 02/17/2014    left. ICU hospitalization 2/2014 for 3 weeks    Fatigue due to depression     GERD

## 2024-05-07 ENCOUNTER — OFFICE VISIT (OUTPATIENT)
Dept: PULMONOLOGY | Age: 59
End: 2024-05-07
Payer: MEDICARE

## 2024-05-07 VITALS
BODY MASS INDEX: 23.95 KG/M2 | SYSTOLIC BLOOD PRESSURE: 100 MMHG | RESPIRATION RATE: 18 BRPM | OXYGEN SATURATION: 98 % | DIASTOLIC BLOOD PRESSURE: 72 MMHG | WEIGHT: 149 LBS | HEIGHT: 66 IN | HEART RATE: 80 BPM

## 2024-05-07 DIAGNOSIS — Z87.891 PERSONAL HISTORY OF TOBACCO USE: ICD-10-CM

## 2024-05-07 DIAGNOSIS — J44.9 CHRONIC OBSTRUCTIVE PULMONARY DISEASE, UNSPECIFIED COPD TYPE (HCC): Primary | ICD-10-CM

## 2024-05-07 DIAGNOSIS — Z87.891 HISTORY OF SMOKING AT LEAST 1 PACK PER DAY FOR AT LEAST 30 YEARS: ICD-10-CM

## 2024-05-07 DIAGNOSIS — J45.30 MILD PERSISTENT ASTHMA WITHOUT COMPLICATION: ICD-10-CM

## 2024-05-07 DIAGNOSIS — R91.1 LUNG NODULE: ICD-10-CM

## 2024-05-07 PROCEDURE — 3023F SPIROM DOC REV: CPT | Performed by: INTERNAL MEDICINE

## 2024-05-07 PROCEDURE — G8420 CALC BMI NORM PARAMETERS: HCPCS | Performed by: INTERNAL MEDICINE

## 2024-05-07 PROCEDURE — 1036F TOBACCO NON-USER: CPT | Performed by: INTERNAL MEDICINE

## 2024-05-07 PROCEDURE — G0296 VISIT TO DETERM LDCT ELIG: HCPCS | Performed by: INTERNAL MEDICINE

## 2024-05-07 PROCEDURE — 3017F COLORECTAL CA SCREEN DOC REV: CPT | Performed by: INTERNAL MEDICINE

## 2024-05-07 PROCEDURE — 99214 OFFICE O/P EST MOD 30 MIN: CPT | Performed by: INTERNAL MEDICINE

## 2024-05-07 PROCEDURE — G8428 CUR MEDS NOT DOCUMENT: HCPCS | Performed by: INTERNAL MEDICINE

## 2024-05-07 RX ORDER — ALBUTEROL SULFATE 90 UG/1
2 AEROSOL, METERED RESPIRATORY (INHALATION) EVERY 6 HOURS PRN
Qty: 1 EACH | Refills: 11 | Status: SHIPPED | OUTPATIENT
Start: 2024-05-07

## 2024-05-07 NOTE — PROGRESS NOTES
PULMONARY OUTPATIENT PROGRESS NOTE        Patient:  Laurel Harley  YOB: 1965    MRN: [id number]     Acct:        Pt seen and Chart reviewed.Mr/Ms Laurel Harley is here in followup for    Diagnosis   1. Chronic obstructive pulmonary disease, unspecified COPD type (HCC)    2. Mild persistent asthma without complication    3. Fatigue, unspecified type    4. Psychophysiological insomnia      Since patient was seen last time she did not have any steroids antibiotic use or exacerbation requiring ER visit or antibiotic.  She has occasional cough as before she denies any change in the cough.  She does not complain of much sputum production denies any change in color of the sputum if any.  Her problem is mostly when it is humid and that is when she gets cough and chest tightness and sometimes wheezing.  She does not complain of daily or persistent wheezing.  When she get humidity and she has some cough she is able to bring sputum which is clear.  She does not complain of waking up at night with cough wheezing chest tightness or shortness of breath.  She is able to do her regular activities she think that she can walk 1-2 block usually without getting shortness of breath with her own pace.  She is taking Breo once daily and she is on Incruse once daily she denies any problem with either of the medications.  She does not usually require albuterol except when it is more hot and humid and then she will need albuterol.    She had a low-dose CT scan of the chest done on 10/23/2023 which shows a stable 3 to 4 mm nodules which are mostly present in right upper and right lower lung and is compared to previous CT scans stable from 10/19/2022 and 10/18/2021.  There is left lateral pleural-based scarlike nodular area which has been stable      She had pulmonary function test done on 05/18/2022 which shows mild obstruction with normal FEV1 but shows significant

## 2024-05-16 ENCOUNTER — OFFICE VISIT (OUTPATIENT)
Dept: FAMILY MEDICINE CLINIC | Age: 59
End: 2024-05-16

## 2024-05-16 VITALS
WEIGHT: 138.6 LBS | HEART RATE: 77 BPM | HEIGHT: 66 IN | BODY MASS INDEX: 22.28 KG/M2 | DIASTOLIC BLOOD PRESSURE: 62 MMHG | SYSTOLIC BLOOD PRESSURE: 88 MMHG | OXYGEN SATURATION: 98 %

## 2024-05-16 DIAGNOSIS — M21.41 PES PLANUS OF BOTH FEET: ICD-10-CM

## 2024-05-16 DIAGNOSIS — K22.70 BARRETT'S ESOPHAGUS WITHOUT DYSPLASIA: ICD-10-CM

## 2024-05-16 DIAGNOSIS — M25.562 ACUTE PAIN OF LEFT KNEE: Primary | ICD-10-CM

## 2024-05-16 DIAGNOSIS — E78.00 HIGH CHOLESTEROL: ICD-10-CM

## 2024-05-16 DIAGNOSIS — M21.42 PES PLANUS OF BOTH FEET: ICD-10-CM

## 2024-05-16 RX ORDER — ATORVASTATIN CALCIUM 20 MG/1
20 TABLET, FILM COATED ORAL DAILY
Qty: 120 TABLET | Refills: 3 | Status: SHIPPED | OUTPATIENT
Start: 2024-05-16

## 2024-05-16 RX ORDER — IBUPROFEN 800 MG/1
800 TABLET ORAL 3 TIMES DAILY
Qty: 180 TABLET | Refills: 0 | Status: SHIPPED | OUTPATIENT
Start: 2024-05-16

## 2024-05-16 RX ORDER — OMEPRAZOLE 40 MG/1
40 CAPSULE, DELAYED RELEASE ORAL DAILY
Qty: 90 CAPSULE | Refills: 3 | Status: SHIPPED | OUTPATIENT
Start: 2024-05-16

## 2024-05-16 ASSESSMENT — ENCOUNTER SYMPTOMS
COUGH: 0
WHEEZING: 0
GASTROINTESTINAL NEGATIVE: 1
SHORTNESS OF BREATH: 0

## 2024-05-16 NOTE — PROGRESS NOTES
Attending Physician Statement  I have discussed the care of Laurel Harley, including pertinent history and exam findings,  with the resident. I have reviewed the key elements of all parts of the encounter with the resident.  I agree with the assessment, plan and orders as documented by the resident.  (GE Modifier)    Darnell Townsend MD  
Visit Information    Have you changed or started any medications since your last visit including any over-the-counter medicines, vitamins, or herbal medicines? no   Have you stopped taking any of your medications? Is so, why? -  no  Are you having any side effects from any of your medications? - no    Have you seen any other physician or provider since your last visit?  yes - GI , Pulmonary    Have you had any other diagnostic tests since your last visit?  yes - Labs    Have you been seen in the emergency room and/or had an admission in a hospital since we last saw you?  yes - [facility]    Have you had your routine dental cleaning in the past 6 months?  no     Do you have an active MyChart account? If no, what is the barrier?  Yes    Patient Care Team:  Nicole Carver MD as PCP - General (Family Medicine)  Rigoberto Mann MD as Surgeon (Cardiothoracic Surgery)  Joseph Zhao MD as Consulting Physician (Pulmonology)  [facility]    Medical History Review  Past Medical, Family, and Social History reviewed and does not contribute to the patient presenting condition    Health Maintenance   Topic Date Due    Hepatitis B vaccine (1 of 3 - 3-dose series) Never done    Annual Wellness Visit (Medicare Advantage)  01/01/2024    DTaP/Tdap/Td vaccine (2 - Td or Tdap) 04/30/2024    Lipids  06/16/2024    Depression Screen  10/10/2024    Colorectal Cancer Screen  10/11/2024    Low dose CT lung screening &/or counseling  10/23/2024    Breast cancer screen  12/20/2024    Flu vaccine  Completed    Shingles vaccine  Completed    Pneumococcal 0-64 years Vaccine  Completed    COVID-19 Vaccine  Completed    Hepatitis C screen  Completed    HIV screen  Completed    Hepatitis A vaccine  Aged Out    Hib vaccine  Aged Out    Polio vaccine  Aged Out    Meningococcal (ACWY) vaccine  Aged Out             
  Constitutional:  Negative for fatigue and fever.   Respiratory:  Negative for cough, chest tightness, shortness of breath and wheezing.    Cardiovascular:  Negative for chest pain.   Gastrointestinal: Negative.    Genitourinary: Negative.    Musculoskeletal:  Positive for arthralgias. Negative for gait problem, joint swelling and myalgias.   Neurological: Negative.                  The patient has a   Family History   Problem Relation Age of Onset    Asthma Mother     Stroke Father     Heart Attack Father     Cancer Maternal Grandmother         skin cancer    Diabetes Maternal Uncle        Objective:    BP (!) 88/62 (Site: Left Upper Arm, Position: Sitting, Cuff Size: Medium Adult)   Pulse 77   Ht 1.676 m (5' 6\")   Wt 62.9 kg (138 lb 9.6 oz)   SpO2 98%   BMI 22.37 kg/m²    BP Readings from Last 3 Encounters:   05/16/24 (!) 88/62   05/07/24 100/72   05/01/24 119/70       Physical Exam  Constitutional:       Appearance: Normal appearance. She is normal weight.   Cardiovascular:      Rate and Rhythm: Normal rate and regular rhythm.      Pulses: Normal pulses.      Heart sounds: Normal heart sounds.   Pulmonary:      Effort: Pulmonary effort is normal.      Breath sounds: No wheezing.   Musculoskeletal:         General: No swelling, tenderness or signs of injury. Normal range of motion.      Right knee: Normal.      Left knee: No swelling, effusion, erythema, bony tenderness or crepitus. Normal range of motion. No tenderness.         Lab Results   Component Value Date    WBC 5.2 06/16/2023    HGB 13.0 06/16/2023    HCT 39.8 06/16/2023     06/16/2023    CHOL 189 06/16/2023    TRIG 81 06/16/2023    HDL 73 06/16/2023    ALT 18 06/16/2023    AST 21 06/16/2023     06/16/2023    K 3.9 06/16/2023     06/16/2023    CREATININE 0.57 06/16/2023    BUN 10 06/16/2023    CO2 26 06/16/2023    TSH 0.43 02/18/2020    INR 1.2 02/21/2014    LABA1C 5.0 05/11/2021     Lab Results   Component Value Date    CALCIUM

## 2024-05-16 NOTE — PATIENT INSTRUCTIONS
Shira nieto for letting us take care of you today. We hope all your questions were addressed. If a question was overlooked or something else comes to mind after you return home, please contact a member of your Care Team listed below.        Your Care Team at [facility] is Team #4  Jessica Patel DO(Faculty)  Delmar Martins (Resident)  Owen Kelly (Resident)  Lasha Salgado(Resident)  Siomara Cardenas, GLADYS Dickson, GLADYS Veloz, GLADYS Amaral, [facility]  Hawa Gutierres, [facility]  Victoria Huitron, [facility]  Telma Lim, [facility]  Mary Alice Briggs, GLADYS Harkins () DERIK Trinh (Clinical Practice Manager)  Flora Jordan [facility] (Clinical Pharmacist)       Office phone number: [phone]    If you need to get in right away due to illness, please be advised we have \"Same Day\" appointments available Monday-Friday. Please call us at [phone] option #3 to schedule your \"Same Day\" appointment.

## 2024-05-23 ENCOUNTER — OFFICE VISIT (OUTPATIENT)
Dept: DERMATOLOGY | Age: 59
End: 2024-05-23
Payer: MEDICARE

## 2024-05-23 VITALS
DIASTOLIC BLOOD PRESSURE: 74 MMHG | HEART RATE: 64 BPM | HEIGHT: 66 IN | BODY MASS INDEX: 22.18 KG/M2 | SYSTOLIC BLOOD PRESSURE: 119 MMHG | WEIGHT: 138 LBS | OXYGEN SATURATION: 98 % | TEMPERATURE: 98.6 F

## 2024-05-23 DIAGNOSIS — L70.0 ACNE VULGARIS: Primary | ICD-10-CM

## 2024-05-23 DIAGNOSIS — L71.9 ROSACEA: ICD-10-CM

## 2024-05-23 PROCEDURE — G8420 CALC BMI NORM PARAMETERS: HCPCS | Performed by: PHYSICIAN ASSISTANT

## 2024-05-23 PROCEDURE — 1036F TOBACCO NON-USER: CPT | Performed by: PHYSICIAN ASSISTANT

## 2024-05-23 PROCEDURE — 99213 OFFICE O/P EST LOW 20 MIN: CPT | Performed by: PHYSICIAN ASSISTANT

## 2024-05-23 PROCEDURE — G8427 DOCREV CUR MEDS BY ELIG CLIN: HCPCS | Performed by: PHYSICIAN ASSISTANT

## 2024-05-23 PROCEDURE — 3017F COLORECTAL CA SCREEN DOC REV: CPT | Performed by: PHYSICIAN ASSISTANT

## 2024-05-23 RX ORDER — IVERMECTIN 10 MG/G
CREAM TOPICAL
Qty: 45 G | Refills: 3 | Status: SHIPPED | OUTPATIENT
Start: 2024-05-23

## 2024-05-23 RX ORDER — TRETINOIN 0.5 MG/G
CREAM TOPICAL
Qty: 45 G | Refills: 11 | Status: SHIPPED | OUTPATIENT
Start: 2024-05-23

## 2024-05-24 ENCOUNTER — TELEPHONE (OUTPATIENT)
Dept: DERMATOLOGY | Age: 59
End: 2024-05-24

## 2024-05-24 NOTE — TELEPHONE ENCOUNTER
Please send ivermectin 1% cream (#30 g) w/ 5 RF to [facility] (compounded).  Apply a pea sized amount, to affected regions of face, daily.

## 2024-05-24 NOTE — TELEPHONE ENCOUNTER
Patient insurance has denied the ivermectin. States that either metronidazole, or azelaic acid is preferred.

## 2024-06-17 ENCOUNTER — HOSPITAL ENCOUNTER (OUTPATIENT)
Age: 59
Discharge: HOME | End: 2024-06-19
Payer: MEDICARE

## 2024-06-17 ENCOUNTER — HOSPITAL ENCOUNTER (OUTPATIENT)
Dept: GENERAL RADIOLOGY | Age: 59
Discharge: HOME | End: 2024-06-19
Payer: MEDICARE

## 2024-06-17 DIAGNOSIS — M25.562 ACUTE PAIN OF LEFT KNEE: ICD-10-CM

## 2024-06-17 PROCEDURE — 73560 X-RAY EXAM OF KNEE 1 OR 2: CPT

## 2024-06-19 ENCOUNTER — OFFICE VISIT (OUTPATIENT)
Dept: PODIATRY | Age: 59
End: 2024-06-19
Payer: MEDICARE

## 2024-06-19 VITALS
SYSTOLIC BLOOD PRESSURE: 112 MMHG | WEIGHT: 138 LBS | HEIGHT: 66 IN | HEART RATE: 72 BPM | DIASTOLIC BLOOD PRESSURE: 76 MMHG | BODY MASS INDEX: 22.18 KG/M2

## 2024-06-19 DIAGNOSIS — M79.671 FOOT PAIN, BILATERAL: ICD-10-CM

## 2024-06-19 DIAGNOSIS — G89.29 CHRONIC BILATERAL LOW BACK PAIN WITHOUT SCIATICA: Primary | ICD-10-CM

## 2024-06-19 DIAGNOSIS — M79.672 FOOT PAIN, BILATERAL: ICD-10-CM

## 2024-06-19 DIAGNOSIS — M54.50 CHRONIC BILATERAL LOW BACK PAIN WITHOUT SCIATICA: Primary | ICD-10-CM

## 2024-06-19 PROBLEM — M21.40 PES PLANUS: Status: ACTIVE | Noted: 2024-06-19

## 2024-06-19 PROCEDURE — 99203 OFFICE O/P NEW LOW 30 MIN: CPT

## 2024-06-19 PROCEDURE — G8420 CALC BMI NORM PARAMETERS: HCPCS

## 2024-06-19 PROCEDURE — 99213 OFFICE O/P EST LOW 20 MIN: CPT | Performed by: PODIATRIST

## 2024-06-19 PROCEDURE — G8427 DOCREV CUR MEDS BY ELIG CLIN: HCPCS

## 2024-06-19 PROCEDURE — 1036F TOBACCO NON-USER: CPT

## 2024-06-19 PROCEDURE — 3017F COLORECTAL CA SCREEN DOC REV: CPT

## 2024-06-19 NOTE — PROGRESS NOTES
Patient instructed to remove shoes and socks and instructed to sit in exam chair.  Current PCP is Nicole Carver MD and date of last visit was 05/16/2024.   Do you have a follow up visit scheduled?  No  If yes, the date is     
Exam:     Vascular: DP and PT pulses are intact and palpable, Bilateral. CFT <3 seconds to all digits, Bilateral.  Mild edema appreciated to the right great toe with healed scar from incision.  Hair growth is absent to the level of the digits, Bilateral.      Neuro: Saph/sural/SP/DP/plantar sensation intact to light touch. Protective sensation is intact to 10/10 sites as tested with a 5.07g SWMF, Bilateral.  No sciatica component of neuropathy     Musculoskeletal: EHL/FHL/GS/TA gross motor intact.  No tenderness to palpation to the scar of the right great toe.  Mild hallux abductovalgus angle on the left foot.  Normal arch appreciated bilaterally.  No component of pes planus as the patient was concerned.  Low back pain with palpation along the lumbar region.     Dermatologic: Feet are well-kept with adequate turgor.  No interdigital maceration.  Surgical scar noted to right great toe.  No preulcerative lesions.  No callosities.    Imaging: No new imaging    Assessment   Laurel Harley is a 58 y.o. female with     Diagnosis Orders   1. Chronic bilateral low back pain without sciatica        2. Foot pain, bilateral             Plan   Patient examined and evaluated  Diagnosis and treatment options discussed in detail  Patient was concerned that she had flatfeet as she has been told this in the past.  However on examination she has a relatively normal arch.  She does benefit from custom orthotics and arch supports to help disperse weightbearing area on the feet.  Patient casted for new custom orthotics in office.  Recommended orthopedic follow-up for low back pain.  She will be called when orthotics are made and can pick them up at that time.  Please, call the office with any questions or concerns     No orders of the defined types were placed in this encounter.    No orders of the defined types were placed in this encounter.      Temo Gongora DPM   Podiatric Medicine & Surgery   6/21/2024 at 9:39 AM    I

## 2024-07-05 DIAGNOSIS — L70.0 ACNE VULGARIS: ICD-10-CM

## 2024-07-08 RX ORDER — TRETINOIN 0.5 MG/G
CREAM TOPICAL
Qty: 90 G | Refills: 3 | Status: SHIPPED | OUTPATIENT
Start: 2024-07-08

## 2024-09-03 ENCOUNTER — TELEPHONE (OUTPATIENT)
Dept: DERMATOLOGY | Age: 59
End: 2024-09-03

## 2024-09-03 DIAGNOSIS — J44.9 CHRONIC OBSTRUCTIVE PULMONARY DISEASE, UNSPECIFIED COPD TYPE (HCC): ICD-10-CM

## 2024-09-03 DIAGNOSIS — L70.0 ACNE VULGARIS: ICD-10-CM

## 2024-09-03 RX ORDER — FLUTICASONE FUROATE AND VILANTEROL 200; 25 UG/1; UG/1
1 POWDER RESPIRATORY (INHALATION) DAILY
Qty: 30 EACH | Refills: 11 | Status: SHIPPED | OUTPATIENT
Start: 2024-09-03

## 2024-09-03 RX ORDER — ALBUTEROL SULFATE 90 UG/1
2 AEROSOL, METERED RESPIRATORY (INHALATION) EVERY 6 HOURS PRN
Qty: 1 EACH | Refills: 11 | Status: SHIPPED | OUTPATIENT
Start: 2024-09-03

## 2024-09-03 RX ORDER — UMECLIDINIUM 62.5 UG/1
1 AEROSOL, POWDER ORAL DAILY
Qty: 30 EACH | Refills: 11 | Status: SHIPPED | OUTPATIENT
Start: 2024-09-03

## 2024-09-03 NOTE — TELEPHONE ENCOUNTER
Patient called and stated that richard is having issues getting in her inhalers and would like orders sent to another pharmacy.  Patient was last seen 5/7/24.  I did pend the orders for your approval if you agree please sign

## 2024-09-03 NOTE — TELEPHONE ENCOUNTER
Can you please sent her Retin-A over to [facility]. She was using [facility] then [facility] who lost the RX during the transfer. So please now send to [facility].   Current diet order meets estimated nutrient requirements

## 2024-09-04 RX ORDER — TRETINOIN 0.5 MG/G
CREAM TOPICAL
Qty: 90 G | Refills: 3 | Status: SHIPPED | OUTPATIENT
Start: 2024-09-04

## 2024-09-05 DIAGNOSIS — K22.70 BARRETT'S ESOPHAGUS WITHOUT DYSPLASIA: ICD-10-CM

## 2024-09-05 DIAGNOSIS — E78.00 HIGH CHOLESTEROL: ICD-10-CM

## 2024-09-05 RX ORDER — ATORVASTATIN CALCIUM 20 MG/1
20 TABLET, FILM COATED ORAL DAILY
Qty: 120 TABLET | Refills: 3 | Status: SHIPPED | OUTPATIENT
Start: 2024-09-05

## 2024-09-05 RX ORDER — OMEPRAZOLE 40 MG/1
40 CAPSULE, DELAYED RELEASE ORAL DAILY
Qty: 90 CAPSULE | Refills: 3 | Status: SHIPPED | OUTPATIENT
Start: 2024-09-05

## 2024-09-05 NOTE — TELEPHONE ENCOUNTER
Last visit: 06/19/24  Last Med refill:   Does patient have enough medication for 72 hours: No:     Next Visit Date:  Future Appointments   Date Time Provider Department Center   10/24/2024  1:00 PM STA CT SCAN RM 1 [facility] CT SCAN [facility] Radiolog   10/29/2024 10:00 AM SCHEDULE, P RESPIRATORY SPEC Resp Spec [facility]   5/22/2025 10:00 AM Gaudencio Gorman PA-C  derm [facility]       Health Maintenance   Topic Date Due    Annual Wellness Visit (Medicare Advantage)  01/01/2024    DTaP/Tdap/Td vaccine (2 - Td or Tdap) 04/30/2024    Lipids  06/16/2024    Respiratory Syncytial Virus (RSV) Pregnant or age 60 yrs+ (1 - 1-dose 60+ series) Never done    Flu vaccine (1) 08/01/2024    Depression Screen  10/10/2024    Colorectal Cancer Screen  10/11/2024    Lung Cancer Screening &/or Counseling  10/23/2024    Breast cancer screen  12/20/2024    Shingles vaccine  Completed    Pneumococcal 0-64 years Vaccine  Completed    COVID-19 Vaccine  Completed    Hepatitis C screen  Completed    HIV screen  Completed    Hepatitis A vaccine  Aged Out    Hepatitis B vaccine  Aged Out    Hib vaccine  Aged Out    Polio vaccine  Aged Out    Meningococcal (ACWY) vaccine  Aged Out       Hemoglobin A1C (%)   Date Value   05/11/2021 5.0   04/19/2018 5.5   02/12/2014 5.7             ( goal A1C is < 7)   No components found for: \"LABMICR\"  No components found for: \"LDLCHOLESTEROL\", \"LDLCALC\"    (goal LDL is <100)   AST (U/L)   Date Value   06/16/2023 21     ALT (U/L)   Date Value   06/16/2023 18     BUN (mg/dL)   Date Value   06/16/2023 10     BP Readings from Last 3 Encounters:   06/19/24 112/76   05/23/24 119/74   05/16/24 (!) 88/62          (goal 120/80)    All Future Testing planned in [facility]  Lab Frequency Next Occurrence   Hepatitis B Surface Antibody Once 10/10/2023   CT Lung Screen (Initial/Annual/Baseline) Once 10/18/2024   Full PFT Study With Bronchodilator Once 11/07/2024               Patient Active Problem List:     Vargas's esophagus without

## 2024-10-29 ENCOUNTER — OFFICE VISIT (OUTPATIENT)
Dept: PULMONOLOGY | Age: 59
End: 2024-10-29

## 2024-10-29 VITALS
OXYGEN SATURATION: 98 % | HEART RATE: 75 BPM | WEIGHT: 140 LBS | BODY MASS INDEX: 22.5 KG/M2 | RESPIRATION RATE: 18 BRPM | SYSTOLIC BLOOD PRESSURE: 101 MMHG | DIASTOLIC BLOOD PRESSURE: 71 MMHG | HEIGHT: 66 IN

## 2024-10-29 DIAGNOSIS — R91.1 LUNG NODULE: ICD-10-CM

## 2024-10-29 DIAGNOSIS — J45.40 MODERATE PERSISTENT ASTHMA WITHOUT COMPLICATION: ICD-10-CM

## 2024-10-29 DIAGNOSIS — J44.9 CHRONIC OBSTRUCTIVE PULMONARY DISEASE, UNSPECIFIED COPD TYPE (HCC): Primary | ICD-10-CM

## 2024-10-29 DIAGNOSIS — Z87.891 HISTORY OF SMOKING AT LEAST 1 PACK PER DAY FOR AT LEAST 30 YEARS: ICD-10-CM

## 2024-10-29 NOTE — PROGRESS NOTES
Pulmonary function test.  Date of study 10/29/2024.      Spirometry shows FVC is 2.56 80% predicted.  FEV1 is 1.95 76% predicted consistent with mild obstructive ventilatory impairment.  Postbronchodilator FEV1 FVC there was no response.  Lung volume shows total lung capacity is 4.20 80% predicted which is within normal limit.  Residual volume is 2.00 100% predicted which is within normal limit.  Diffusion capacity is 13.42 69% predicted consistent with mild reduction diffusion capacity which could be secondary to parenchymal lung disease, emphysema or pulmonary vascular disease.      Impression:      This pulmonary function test is consistent with mild obstructive ventilatory impairment without significant response to bronchodilator but clinical response is possible.  Lung volumes are normal.  There is mild reduction diffusion capacity which could be secondary to emphysema, intraparenchymal lung disease or pulmonary vascular disease.  Clinical correlation is recommended.

## 2024-10-29 NOTE — PROGRESS NOTES
PULMONARY OUTPATIENT PROGRESS NOTE        Patient:  Laurel Harley  YOB: 1965    MRN: [id number]     Acct:        Pt seen and Chart reviewed.Mr/Ms Laurel Harley is here in followup for    Diagnosis   1. Chronic obstructive pulmonary disease, unspecified COPD type (HCC)    2. Mild persistent asthma without complication    3. Fatigue, unspecified type    4. Psychophysiological insomnia      Since she was seen last time she did not have any exacerbation in steroids or antibiotic use.  According patient she does not complain of daily cough does not have wheezing especially during this time of the year but in summer for 3 to 4 months she has symptoms of intermittent cough and tightness relieved with albuterol and during that time she was taking albuterol almost daily and after some her symptoms are better the last time she took albuterol was 2 weeks ago.  She denies nocturnal waking with cough wheezing chest tightness or shortness of breath.  She does not complain of shortness of breath she is able to do her regular activities she gets shortness of breath on exertional activities and when she has to do fast activities.    She is taking Breo once daily she is on Incruse once daily according patient if she does not take Breo she feels a difference and Breo had worked the best so far of all the other inhaler she denies any problem taking Breo and she does not have thrush she does rinse her mouth after she use inhalers.    Low-dose CT scan of the chest was scheduled in mid October but she did not get it done will need to be rescheduled.    Pulmonary function test done today 10/29/2024 showed FEV1 of 76% FVC was 80% her total lung capacity was 80% residual volume was 100% and her diffusion capacity was 69%.    Pulmonary function test done on 05/18/2022 showed mild obstruction with normal FEV1 but shows significant airway trapping as evidenced by both

## 2024-11-01 DIAGNOSIS — J44.9 CHRONIC OBSTRUCTIVE PULMONARY DISEASE, UNSPECIFIED COPD TYPE (HCC): ICD-10-CM

## 2025-03-26 NOTE — PROGRESS NOTES
nodules in the right lower lobe and right middle   lobe. Mild emphysema. 2. Old granulomatous disease. 3. Moderate hiatal hernia. 4. Trace pericardial fluid. 5. Stable low-density lesion measuring 1 cm in the left hepatic lobe,   unchanged from 09/10/2019. Low-dose CT chest 09/10/2019  Mediastinum:  Heart size at the upper limit of normal.  Trace pericardial   effusion. Thoracic aorta is normal caliber. No lymphadenopathy. Calcified   right hilar lymph nodes. Thyroid and esophagus are unremarkable. Lungs/Pleura:  No consolidation or pleural effusion. Mild left apical   scarring. Mild scarring at the lung bases. Stable 4 mm nodules in the right   lower lobe on image 69 and right middle lobe on image 67. Scattered   calcified granulomas. [facility] 04/26/2016  Mild scarring changes similar to May 8, 2014 radiography. Calcified right hilar lymph nodes and right lung granulomas suggesting prior granulomatous disease. Small hiatal hernia.            Immunization History   Administered Date(s) Administered    COVID-19, PFIZER PURPLE top, DILUTE for use, (age 15 y+), 30mcg/0.3mL 02/25/2021, 03/18/2021, 01/22/2022    COVID-19, PFIZER, (2023-24 formula), (age 12y+), IM, 30mcg/0.3mL 09/29/2023    Influenza Virus Vaccine 02/13/2014    Influenza, AFLURIA (age 1 yrs+), FLUZONE, (age 10 mo+), MDV, 0.5mL 12/21/2017    Influenza, FLUARIX, FLULAVAL, FLUZONE (age 10 mo+) AND AFLURIA, (age 1 y+), PF, 0.5mL 11/20/2018, 01/03/2020, 12/08/2020, 12/13/2021, 10/18/2022    Influenza, FLUCELVAX, (age 10 mo+), MDCK, PF, 0.5mL 09/29/2023    Influenza, Triv, 3 Years and older, IM (Afluria (5 yrs and older) 11/08/2016    Pneumococcal, PCV-13, PREVNAR 15, (age 6w+), IM, 0.5mL 11/08/2016    Pneumococcal, PCV20, PREVNAR 21, (age 6w+), IM, 0.5mL 09/29/2023    Pneumococcal, PPSV23, PNEUMOVAX 23, (age 2y+), SC/IM, 0.5mL 02/13/2014    TDaP, ADACEL (age 6y-58y), BOOSTRIX (age 10y+), IM, 0.5mL 04/30/2014    Zoster Recombinant
urinary catheter complications

## 2025-03-27 ENCOUNTER — HOSPITAL ENCOUNTER (OUTPATIENT)
Dept: MAMMOGRAPHY | Age: 60
Discharge: HOME | End: 2025-03-29
Attending: INTERNAL MEDICINE
Payer: MEDICARE

## 2025-03-27 ENCOUNTER — RESULTS FOLLOW-UP (OUTPATIENT)
Dept: PULMONOLOGY | Age: 60
End: 2025-03-27

## 2025-03-27 ENCOUNTER — HOSPITAL ENCOUNTER (OUTPATIENT)
Dept: CT IMAGING | Age: 60
Discharge: HOME | End: 2025-03-29
Attending: INTERNAL MEDICINE
Payer: MEDICARE

## 2025-03-27 VITALS — WEIGHT: 139 LBS | HEIGHT: 66 IN | BODY MASS INDEX: 22.34 KG/M2

## 2025-03-27 DIAGNOSIS — Z87.891 PERSONAL HISTORY OF TOBACCO USE: ICD-10-CM

## 2025-03-27 DIAGNOSIS — Z12.31 VISIT FOR SCREENING MAMMOGRAM: ICD-10-CM

## 2025-03-27 PROCEDURE — 77067 SCR MAMMO BI INCL CAD: CPT

## 2025-03-27 PROCEDURE — 71271 CT THORAX LUNG CANCER SCR C-: CPT

## 2025-04-08 ENCOUNTER — OFFICE VISIT (OUTPATIENT)
Age: 60
End: 2025-04-08

## 2025-04-08 VITALS
TEMPERATURE: 98.6 F | BODY MASS INDEX: 22.5 KG/M2 | DIASTOLIC BLOOD PRESSURE: 72 MMHG | WEIGHT: 140 LBS | HEART RATE: 80 BPM | HEIGHT: 66 IN | RESPIRATION RATE: 18 BRPM | OXYGEN SATURATION: 97 % | SYSTOLIC BLOOD PRESSURE: 117 MMHG

## 2025-04-08 DIAGNOSIS — R10.30 ABDOMINAL PAIN, LOWER: Primary | ICD-10-CM

## 2025-04-08 PROBLEM — N39.0 URINARY TRACT INFECTION WITHOUT HEMATURIA: Status: ACTIVE | Noted: 2025-04-08

## 2025-04-08 LAB
BILIRUBIN, POC: NORMAL
BLOOD URINE, POC: NORMAL
CLARITY, POC: CLEAR
COLOR, POC: NORMAL
GLUCOSE URINE, POC: NORMAL MG/DL
KETONES, POC: NORMAL MG/DL
LEUKOCYTE EST, POC: NORMAL
NITRITE, POC: NORMAL
PH, POC: 6.5
PROTEIN, POC: NORMAL MG/DL
SPECIFIC GRAVITY, POC: NORMAL
UROBILINOGEN, POC: NORMAL MG/DL

## 2025-04-08 ASSESSMENT — ENCOUNTER SYMPTOMS
BACK PAIN: 0
COLOR CHANGE: 0
NAUSEA: 1
VOICE CHANGE: 0
VOMITING: 0
CHEST TIGHTNESS: 0
EYE PAIN: 0
CONSTIPATION: 0
SORE THROAT: 0
EYE REDNESS: 0
DIARRHEA: 0
SHORTNESS OF BREATH: 0
TROUBLE SWALLOWING: 0
ABDOMINAL PAIN: 1
COUGH: 0

## 2025-04-08 NOTE — PROGRESS NOTES
Making     Lower abdominal pain.  Unspecified.  Urine dip is unremarkable.  Patient has localized tenderness over suprapubic area.  In mild distress.  Patient needs further management evaluation of her abdominal pain.  She was sent to [facility] ER in a private vehicle for further evaluation.        An electronic signature was used to authenticate this note.    --ASMITA Machuca - CNP

## 2025-04-18 ENCOUNTER — OFFICE VISIT (OUTPATIENT)
Dept: FAMILY MEDICINE CLINIC | Age: 60
End: 2025-04-18
Payer: MEDICARE

## 2025-04-18 VITALS
WEIGHT: 139.2 LBS | SYSTOLIC BLOOD PRESSURE: 118 MMHG | DIASTOLIC BLOOD PRESSURE: 64 MMHG | HEIGHT: 66 IN | BODY MASS INDEX: 22.37 KG/M2

## 2025-04-18 DIAGNOSIS — T78.2XXS ANAPHYLAXIS, SEQUELA: ICD-10-CM

## 2025-04-18 DIAGNOSIS — I10 ESSENTIAL HYPERTENSION: ICD-10-CM

## 2025-04-18 DIAGNOSIS — M25.562 ACUTE PAIN OF LEFT KNEE: ICD-10-CM

## 2025-04-18 DIAGNOSIS — E78.00 HIGH CHOLESTEROL: ICD-10-CM

## 2025-04-18 DIAGNOSIS — Z76.0 MEDICATION REFILL: ICD-10-CM

## 2025-04-18 DIAGNOSIS — R35.0 URINARY FREQUENCY: Primary | ICD-10-CM

## 2025-04-18 DIAGNOSIS — K22.70 BARRETT'S ESOPHAGUS WITHOUT DYSPLASIA: ICD-10-CM

## 2025-04-18 LAB
BILIRUBIN, POC: NEGATIVE
BLOOD URINE, POC: NEGATIVE
CLARITY, POC: CLEAR
COLOR, POC: NORMAL
GLUCOSE URINE, POC: NEGATIVE MG/DL
KETONES, POC: NEGATIVE MG/DL
LEUKOCYTE EST, POC: NEGATIVE
NITRITE, POC: NEGATIVE
PH, POC: 6.5
PROTEIN, POC: NEGATIVE MG/DL
SPECIFIC GRAVITY, POC: 1.01
UROBILINOGEN, POC: 0.2 MG/DL

## 2025-04-18 PROCEDURE — 3074F SYST BP LT 130 MM HG: CPT

## 2025-04-18 PROCEDURE — G8420 CALC BMI NORM PARAMETERS: HCPCS

## 2025-04-18 PROCEDURE — 1036F TOBACCO NON-USER: CPT

## 2025-04-18 PROCEDURE — G8427 DOCREV CUR MEDS BY ELIG CLIN: HCPCS

## 2025-04-18 PROCEDURE — 3078F DIAST BP <80 MM HG: CPT

## 2025-04-18 PROCEDURE — 81002 URINALYSIS NONAUTO W/O SCOPE: CPT

## 2025-04-18 PROCEDURE — 99213 OFFICE O/P EST LOW 20 MIN: CPT

## 2025-04-18 PROCEDURE — 3017F COLORECTAL CA SCREEN DOC REV: CPT

## 2025-04-18 RX ORDER — OMEPRAZOLE 40 MG/1
40 CAPSULE, DELAYED RELEASE ORAL DAILY
Qty: 90 CAPSULE | Refills: 3 | Status: SHIPPED | OUTPATIENT
Start: 2025-04-18

## 2025-04-18 RX ORDER — ATORVASTATIN CALCIUM 20 MG/1
20 TABLET, FILM COATED ORAL DAILY
Qty: 120 TABLET | Refills: 3 | Status: SHIPPED | OUTPATIENT
Start: 2025-04-18

## 2025-04-18 RX ORDER — EPINEPHRINE 0.3 MG/.3ML
INJECTION SUBCUTANEOUS
Qty: 2 EACH | Refills: 2 | Status: SHIPPED | OUTPATIENT
Start: 2025-04-18

## 2025-04-18 RX ORDER — IBUPROFEN 800 MG/1
800 TABLET, FILM COATED ORAL 3 TIMES DAILY
Qty: 180 TABLET | Refills: 0 | Status: SHIPPED | OUTPATIENT
Start: 2025-04-18

## 2025-04-18 SDOH — ECONOMIC STABILITY: FOOD INSECURITY: WITHIN THE PAST 12 MONTHS, YOU WORRIED THAT YOUR FOOD WOULD RUN OUT BEFORE YOU GOT MONEY TO BUY MORE.: NEVER TRUE

## 2025-04-18 SDOH — ECONOMIC STABILITY: FOOD INSECURITY: WITHIN THE PAST 12 MONTHS, THE FOOD YOU BOUGHT JUST DIDN'T LAST AND YOU DIDN'T HAVE MONEY TO GET MORE.: NEVER TRUE

## 2025-04-18 ASSESSMENT — ENCOUNTER SYMPTOMS
DIARRHEA: 0
ABDOMINAL PAIN: 0
NAUSEA: 0
CONSTIPATION: 0
VOMITING: 0
SHORTNESS OF BREATH: 0
SORE THROAT: 0

## 2025-04-18 ASSESSMENT — PATIENT HEALTH QUESTIONNAIRE - PHQ9
SUM OF ALL RESPONSES TO PHQ QUESTIONS 1-9: 0
2. FEELING DOWN, DEPRESSED OR HOPELESS: NOT AT ALL
1. LITTLE INTEREST OR PLEASURE IN DOING THINGS: NOT AT ALL
SUM OF ALL RESPONSES TO PHQ QUESTIONS 1-9: 0

## 2025-04-18 NOTE — PATIENT INSTRUCTIONS
Thank you for letting us take care of you today. We hope all your questions were addressed. If a question was overlooked or something else comes to mind after you return home, please contact a member of your Care Team listed below.      Your Care Team at [facility] is Team #2  Ria Magana M.D. (Faculty)  Luis Gomez M.D. (Resident)  Nicole Carver M.D. (Resident)  Owen Kelly M.D. (Resident)  Tamera Campos M.D. (Resident)  Minnie Rojas M.D. (Resident)  Alejandro Veloz, [facility]  Chanel Amaral, [facility]  Telma Lim,  [facility]  Victoria Huitron, [facility]  Mary Alice Briggs, [facility]  Hawa Gutierres, [facility]  aNresh Harkins (LJ) [facility]ADYS (Clinical Practice Manager)  Flora Jordan [facility] (Clinical Pharmacist)     Office phone number: [phone]    If you need to get in right away due to illness, please be advised we have \"Same Day\" appointments available Monday-Friday. Please call us at [phone] option #3 to schedule your \"Same Day\" appointment.

## 2025-04-18 NOTE — PROGRESS NOTES
[facility] Program - Outpatient Note      Subjective:    Laurel Harley is a 59 y.o. female with  has a past medical history of Acne, Vargas's esophagus, Bipolar disorder, Bunion of great toe of left foot, Bunion of great toe of right foot, Chronic obstructive pulmonary disease (COPD) ([facility]), Depression, Dyspnea on exertion, Eating disorder, Empyema ([facility]), Fatigue due to depression, GERD (gastroesophageal reflux disease), H/O chronic respiratory failure, Moderate persistent asthma, Pneumonia, Seasonal allergies, Smoking, Substance abuse, and Wellness examination.    Presented to the office today for:  Chief Complaint   Patient presents with    Follow-up     Constant lower back pain, urgent care follow up - UTI symptoms, frequent urinary urgency       HPI      Lower back pain  - started suddenly 2-3 months ago, now daily  - denies weakness or numbness in LE, saddle anesthesia, urinary or bowel incontinence.     Endorsing that she has increased urinary frequency, however recently started feeling like she doesn't empty her bladder.   Can make it to the bathroom, no accidents   Denies leaking of urine, weak urine stream, difficulty starting urinary, vaginal discharge, dysuria  Doesn't loose control when coughing or sneezing  Endorsing burning and discomfort in vaginal area for the past 2 weeks  Has not been sexually active  History of total hysterectomy.      Review of Systems   Constitutional:  Negative for fatigue.   HENT:  Negative for sore throat.    Eyes:  Negative for visual disturbance.   Respiratory:  Negative for shortness of breath.    Cardiovascular:  Negative for chest pain and palpitations.   Gastrointestinal:  Negative for abdominal pain, constipation, diarrhea, nausea and vomiting.   Endocrine: Negative for polyuria.   Genitourinary:  Positive for frequency and urgency. Negative for dysuria, flank pain, hematuria and vaginal discharge.   Musculoskeletal:

## 2025-04-18 NOTE — PROGRESS NOTES
Attending Physician Statement  I  have discussed the care of Laurel Harley including pertinent history and exam findings with the resident. I agree with the assessment, plan and orders as documented by the resident.      /64 (BP Site: Left Upper Arm, Patient Position: Sitting, BP Cuff Size: Medium Adult)   Ht 1.676 m (5' 6\")   Wt 63.1 kg (139 lb 3.2 oz)   BMI 22.47 kg/m²    BP Readings from Last 3 Encounters:   04/18/25 118/64   04/08/25 117/72   10/29/24 101/71     Wt Readings from Last 3 Encounters:   04/18/25 63.1 kg (139 lb 3.2 oz)   04/08/25 63.5 kg (140 lb)   03/27/25 63 kg (139 lb)          Diagnosis Orders   1. Urinary frequency  POCT Urinalysis Dipstick no Micro    [facility]      2. High cholesterol  atorvastatin (LIPITOR) 20 MG tablet      3. Medication refill  EPINEPHrine (EPIPEN 2-CRUZ) 0.3 MG/0.3ML SOAJ injection      4. Anaphylaxis, sequela  EPINEPHrine (EPIPEN 2-CRUZ) 0.3 MG/0.3ML SOAJ injection      5. Vargas's esophagus without dysplasia  omeprazole (PRILOSEC) 40 MG delayed release capsule      6. Acute pain of left knee  ibuprofen (ADVIL;MOTRIN) 800 MG tablet      7. Essential hypertension  Lipid Panel              Zaire Alonzo DO 4/18/2025 2:47 PM

## 2025-04-18 NOTE — PROGRESS NOTES
Visit Information    Have you changed or started any medications since your last visit including any over-the-counter medicines, vitamins, or herbal medicines? no   Have you stopped taking any of your medications? Is so, why? -  no  Are you having any side effects from any of your medications? - no    Have you seen any other physician or provider since your last visit?  yes - Urgent Care, Podiatry, Pulmonology   Have you had any other diagnostic tests since your last visit?  no   Have you been seen in the emergency room and/or had an admission in a hospital since we last saw you?  no   Have you had your routine dental cleaning in the past 6 months?  no     Do you have an active MyChart account? If no, what is the barrier?  Yes    Patient Care Team:  Nicole Carver MD as PCP - General (Family Medicine)  Rigoberto Mann MD as Surgeon (Cardiothoracic Surgery)  Joseph Zhao MD as Consulting Physician (Pulmonology)  [facility]    Medical History Review  Past Medical, Family, and Social History reviewed and does contribute to the patient presenting condition    Health Maintenance   Topic Date Due    Lipids  06/16/2024    COVID-19 Vaccine (5 - 2024-25 season) 09/01/2024    Depression Screen  10/10/2024    Colorectal Cancer Screen  10/11/2024    Annual Wellness Visit (Medicare Advantage)  01/01/2025    Flu vaccine (Season Ended) 08/01/2025    Lung Cancer Screening &/or Counseling  03/27/2026    Breast cancer screen  03/27/2027    DTaP/Tdap/Td vaccine (3 - Td or Tdap) 10/04/2034    Shingles vaccine  Completed    Pneumococcal 50+ years Vaccine  Completed    Respiratory Syncytial Virus (RSV) Pregnant or age 60 yrs+  Completed    Hepatitis C screen  Completed    HIV screen  Completed    Hepatitis A vaccine  Aged Out    Hepatitis B vaccine  Aged Out    Hib vaccine  Aged Out    Polio vaccine  Aged Out    Meningococcal (ACWY) vaccine  Aged Out    Meningococcal B vaccine  Aged Out    Pneumococcal 0-49 years

## 2025-04-24 ENCOUNTER — HOSPITAL ENCOUNTER (OUTPATIENT)
Age: 60
Setting detail: SPECIMEN
Discharge: HOME | End: 2025-04-24

## 2025-04-24 DIAGNOSIS — I10 ESSENTIAL HYPERTENSION: ICD-10-CM

## 2025-04-24 LAB
CHOLEST SERPL-MCNC: 204 MG/DL (ref 0–199)
CHOLESTEROL/HDL RATIO: 2.7
HDLC SERPL-MCNC: 75 MG/DL
LDLC SERPL CALC-MCNC: 114 MG/DL (ref 0–100)
TRIGL SERPL-MCNC: 76 MG/DL
VLDLC SERPL CALC-MCNC: 15 MG/DL (ref 1–30)

## 2025-04-29 ENCOUNTER — OFFICE VISIT (OUTPATIENT)
Dept: PULMONOLOGY | Age: 60
End: 2025-04-29
Payer: MEDICARE

## 2025-04-29 VITALS
WEIGHT: 139 LBS | DIASTOLIC BLOOD PRESSURE: 71 MMHG | BODY MASS INDEX: 22.34 KG/M2 | SYSTOLIC BLOOD PRESSURE: 100 MMHG | HEART RATE: 84 BPM | RESPIRATION RATE: 19 BRPM | HEIGHT: 66 IN | OXYGEN SATURATION: 100 %

## 2025-04-29 DIAGNOSIS — J44.9 CHRONIC OBSTRUCTIVE PULMONARY DISEASE, UNSPECIFIED COPD TYPE (HCC): Primary | ICD-10-CM

## 2025-04-29 DIAGNOSIS — R91.1 LUNG NODULE: ICD-10-CM

## 2025-04-29 DIAGNOSIS — Z87.891 HISTORY OF SMOKING AT LEAST 1 PACK PER DAY FOR AT LEAST 30 YEARS: ICD-10-CM

## 2025-04-29 DIAGNOSIS — J45.40 MODERATE PERSISTENT ASTHMA WITHOUT COMPLICATION: ICD-10-CM

## 2025-04-29 PROCEDURE — 3023F SPIROM DOC REV: CPT | Performed by: INTERNAL MEDICINE

## 2025-04-29 PROCEDURE — G8427 DOCREV CUR MEDS BY ELIG CLIN: HCPCS | Performed by: INTERNAL MEDICINE

## 2025-04-29 PROCEDURE — 3017F COLORECTAL CA SCREEN DOC REV: CPT | Performed by: INTERNAL MEDICINE

## 2025-04-29 PROCEDURE — 99214 OFFICE O/P EST MOD 30 MIN: CPT | Performed by: INTERNAL MEDICINE

## 2025-04-29 PROCEDURE — G8420 CALC BMI NORM PARAMETERS: HCPCS | Performed by: INTERNAL MEDICINE

## 2025-04-29 PROCEDURE — 1036F TOBACCO NON-USER: CPT | Performed by: INTERNAL MEDICINE

## 2025-04-29 NOTE — PROGRESS NOTES
ADACEL (age 10y-64y), BOOSTRIX (age 10y+), IM, 0.5mL 04/30/2014    Zoster Recombinant (Shingrix) 12/14/2021, 04/15/2022       Results  Imaging  Low dose CT scan screening showed no suspicious pulmonary nodules, no change in the right lung, 3 to 4 mm nodules, stable emphysematous changes, no consolidation. Airways look patent.    Testing  Pulmonary function test done on 10/29/2024 shows FVC was 2.56, 80% predicted. FEV1 was 1.95, 76% predicted. Consistent with mild obstructive ventilatory impairment. Lung volumes shows total lung capacity was 80% and restive volume was 100%. Diffusion capacity was 69%. Pulmonary function test done on 05/18/2022 shows normal FEV1 at that time. Showed significant airway trapping and had increased residual volume of 237% at that time. Total lung capacity was also consistent with airway trapping with 141% of predicted of total lung capacity at that time.      Assessment:      1. Chronic obstructive pulmonary disease, unspecified COPD type (HCC)   2. Mild persistent asthma without complication   3. Fatigue, unspecified type improved   4. Psychophysiological insomnia improve     Assessment:    Pulmonary function test done today 10/29/2024 showed FEV1 of 76% FVC was 80% her total lung capacity was 80% residual volume was 100% and her diffusion capacity was 69%.  Pulmonary function test done on 05/18/2022 showed mild obstruction with normal FEV1 but shows significant airway trapping as evidenced by both increase residual volume to 237% and total lung capacity of 141%.    She had a low-dose CT scan of the chest done on 10/23/2023 which shows a stable 3 to 4 mm nodules which are mostly present in right upper and right lower lung and is compared to previous CT scans stable from 10/19/2022 and 10/18/2021.  There is left lateral pleural-based scarlike nodular area which has been stable     Her symptoms of cough airway reactivity asthma and COPD is better and controlled on Breo and Incruse. She

## 2025-05-22 ENCOUNTER — OFFICE VISIT (OUTPATIENT)
Age: 60
End: 2025-05-22
Payer: MEDICARE

## 2025-05-22 VITALS
BODY MASS INDEX: 22.82 KG/M2 | DIASTOLIC BLOOD PRESSURE: 74 MMHG | OXYGEN SATURATION: 96 % | WEIGHT: 142 LBS | HEIGHT: 66 IN | SYSTOLIC BLOOD PRESSURE: 110 MMHG | HEART RATE: 73 BPM | TEMPERATURE: 98.2 F

## 2025-05-22 DIAGNOSIS — L70.0 ACNE VULGARIS: Primary | ICD-10-CM

## 2025-05-22 PROCEDURE — 3017F COLORECTAL CA SCREEN DOC REV: CPT | Performed by: PHYSICIAN ASSISTANT

## 2025-05-22 PROCEDURE — 1036F TOBACCO NON-USER: CPT | Performed by: PHYSICIAN ASSISTANT

## 2025-05-22 PROCEDURE — G8427 DOCREV CUR MEDS BY ELIG CLIN: HCPCS | Performed by: PHYSICIAN ASSISTANT

## 2025-05-22 PROCEDURE — 99213 OFFICE O/P EST LOW 20 MIN: CPT | Performed by: PHYSICIAN ASSISTANT

## 2025-05-22 PROCEDURE — G8420 CALC BMI NORM PARAMETERS: HCPCS | Performed by: PHYSICIAN ASSISTANT

## 2025-05-22 RX ORDER — TRETINOIN 0.5 MG/G
CREAM TOPICAL
Qty: 45 G | Refills: 11 | Status: SHIPPED | OUTPATIENT
Start: 2025-05-22

## 2025-05-22 NOTE — PROGRESS NOTES
Dermatology Patient Note  [facility]  [facility]  [address]  [place] [address]  Dept: [phone]  Dept Fax: [phone]      VISITDATE: 5/22/2025   REFERRING PROVIDER: No ref. provider found      Laurel Harley is a 59 y.o. female  who presents today in the office for:    Other (Patient presents in the office today as a 1 year follow up for acne and rosacea. Currently using tretinoin.  She was using ivermectin cream but she stopped using it. The acne and rosacea have been under control since her last visit. )      HISTORY OF PRESENT ILLNESS:  As above.    MEDICAL PROBLEMS:  Patient Active Problem List    Diagnosis Date Noted    Urinary tract infection without hematuria 04/08/2025    Abdominal pain, lower 04/08/2025    Pes planus 06/19/2024    Acute pain of left knee 05/16/2024    Pes planus of both feet 05/16/2024    Anaphylactic syndrome 06/16/2023    Hallux abducto valgus, right     Pain in right foot     Polyuria 05/11/2021    Urinary frequency 05/11/2021    Gastroesophageal reflux disease with esophagitis 12/08/2020    Chronic bilateral low back pain without sciatica 12/08/2020    Gingivitis 12/08/2020    Bulimia nervosa, purging type ([facility]) 05/26/2020    Ear fullness, right 06/19/2018    High cholesterol 04/19/2018    Constipation 04/19/2018    Viral URI 08/22/2017    Medication refill 08/23/2016    Seasonal allergies 08/23/2016    Acne comedone 08/23/2016    Bunion 08/23/2016    Orthostatic hypotension 03/09/2015    COPD (chronic obstructive pulmonary disease) ([facility]) 08/07/2014    H/O chronic respiratory failure 07/03/2014    GERD (gastroesophageal reflux disease) 04/30/2014    Empyema ([facility]) 02/19/2014    Hypoxia 02/13/2014    Dental caries 09/17/2013    Smoking 09/17/2013    Vargas's esophagus without dysplasia 05/10/2012    Acne 05/10/2012       CURRENT MEDICATIONS:   Current Outpatient Medications   Medication Sig Dispense Refill    tretinoin

## 2025-06-10 ENCOUNTER — TELEPHONE (OUTPATIENT)
Age: 60
End: 2025-06-10

## 2025-06-10 NOTE — TELEPHONE ENCOUNTER
Called and spoke to the patient and rescheduled her appointment from 6/13/2025 to 8/22/2025 at 1:00 pm

## 2025-06-26 ENCOUNTER — OFFICE VISIT (OUTPATIENT)
Age: 60
End: 2025-06-26

## 2025-06-26 VITALS
RESPIRATION RATE: 18 BRPM | SYSTOLIC BLOOD PRESSURE: 111 MMHG | HEIGHT: 66 IN | TEMPERATURE: 98.8 F | OXYGEN SATURATION: 97 % | HEART RATE: 86 BPM | WEIGHT: 140 LBS | DIASTOLIC BLOOD PRESSURE: 70 MMHG | BODY MASS INDEX: 22.5 KG/M2

## 2025-06-26 DIAGNOSIS — H60.332 ACUTE SWIMMER'S EAR OF LEFT SIDE: Primary | ICD-10-CM

## 2025-06-26 RX ORDER — NEOMYCIN SULFATE, POLYMYXIN B SULFATE AND HYDROCORTISONE 3.5; 10000; 1 MG/ML; [IU]/ML; MG/ML
3 SOLUTION AURICULAR (OTIC) 3 TIMES DAILY
Qty: 5 ML | Refills: 0 | Status: SHIPPED | OUTPATIENT
Start: 2025-06-26 | End: 2025-07-06

## 2025-06-26 ASSESSMENT — ENCOUNTER SYMPTOMS
SORE THROAT: 0
ABDOMINAL PAIN: 0
CHEST TIGHTNESS: 0
EYE PAIN: 0
VOICE CHANGE: 0
VOMITING: 0
EYE REDNESS: 0
SHORTNESS OF BREATH: 0
DIARRHEA: 0
COUGH: 0
CONSTIPATION: 0
BACK PAIN: 0
NAUSEA: 0
TROUBLE SWALLOWING: 0
COLOR CHANGE: 0

## 2025-06-26 NOTE — PROGRESS NOTES
[facility] [place] [id number]  Phone: [phone]  Fax: [phone]      Laurel Harley  1965  MRN: [id number]  Date of visit: 6/26/2025      Chief complaint(s): Ear Pain (For 2 days)      History of present illness :     Laurel Harley  is a  very pleasant  59 y.o. female  patient presented to the urgent care today  for evaluation of been ongoing for the last 2 days.  Symptoms started after swimming 3 days ago.  No discharge or pain in the left ear.  No fever . No sore throat or difficulty swallowing . No hearing changes.  No headache or dizziness . No neck stiffness or pain. No vision changes.  No chest pain , shortness of breath or cough .  He has been trying over-the-counter medications with minimal relief of symptoms. Denies abdominal or back pain.  No nausea or vomiting.  No change in urination or bowel movement.  No neurovascular or motor changes in upper or lower extremities.  No rash.         PAST MEDICAL HISTORY    Past Medical History:   Diagnosis Date    Acne     Dr. [facility]. annual visits    Vargas's esophagus     for 20 yrs on and off. [facility]    Bipolar disorder ([facility])     recently diagnosed and placed on meds. No longer an issue since sobriety    Bunion of great toe of left foot     Bunion of great toe of right foot     Dr. Padilla CPM    Chronic obstructive pulmonary disease (COPD) ([facility])     Dr. Zhao Pulmonology. last visit 7/2021    Depression     On Meds. Dr. Strange [facility]    Dyspnea on exertion     Eating disorder     bulemia    Empyema ([facility]) 02/17/2014    left. ICU hospitalization 2/2014 for 3 weeks    Fatigue due to depression     GERD (gastroesophageal reflux disease)     H/O chronic respiratory failure     Moderate persistent asthma     Pneumonia 02/2014    3 week hospitalization    Seasonal allergies     Smoking     Substance abuse ([facility])     quit 02/2014    Wellness examination

## 2025-07-10 ENCOUNTER — HOSPITAL ENCOUNTER (OUTPATIENT)
Age: 60
Setting detail: SPECIMEN
Discharge: HOME | End: 2025-07-10

## 2025-07-10 ENCOUNTER — OFFICE VISIT (OUTPATIENT)
Dept: FAMILY MEDICINE CLINIC | Age: 60
End: 2025-07-10
Payer: MEDICARE

## 2025-07-10 VITALS
DIASTOLIC BLOOD PRESSURE: 64 MMHG | BODY MASS INDEX: 22.66 KG/M2 | WEIGHT: 141 LBS | HEART RATE: 76 BPM | HEIGHT: 66 IN | TEMPERATURE: 97.2 F | SYSTOLIC BLOOD PRESSURE: 92 MMHG | OXYGEN SATURATION: 98 %

## 2025-07-10 DIAGNOSIS — R73.9 HYPERGLYCEMIA: ICD-10-CM

## 2025-07-10 DIAGNOSIS — F17.200 SMOKER: ICD-10-CM

## 2025-07-10 DIAGNOSIS — Z76.89 ENCOUNTER TO ESTABLISH CARE WITH NEW PROVIDER: ICD-10-CM

## 2025-07-10 DIAGNOSIS — R91.1 LUNG NODULE SEEN ON IMAGING STUDY: ICD-10-CM

## 2025-07-10 DIAGNOSIS — E78.00 HIGH CHOLESTEROL: ICD-10-CM

## 2025-07-10 DIAGNOSIS — J44.9 CHRONIC OBSTRUCTIVE PULMONARY DISEASE, UNSPECIFIED COPD TYPE (HCC): ICD-10-CM

## 2025-07-10 DIAGNOSIS — F14.11 H/O COCAINE ABUSE (HCC): ICD-10-CM

## 2025-07-10 DIAGNOSIS — R53.82 CHRONIC FATIGUE: ICD-10-CM

## 2025-07-10 DIAGNOSIS — F10.11 H/O ALCOHOL ABUSE: ICD-10-CM

## 2025-07-10 DIAGNOSIS — K22.70 BARRETT'S ESOPHAGUS WITHOUT DYSPLASIA: ICD-10-CM

## 2025-07-10 DIAGNOSIS — Z76.89 ENCOUNTER TO ESTABLISH CARE WITH NEW PROVIDER: Primary | ICD-10-CM

## 2025-07-10 DIAGNOSIS — F50.20: ICD-10-CM

## 2025-07-10 DIAGNOSIS — K21.9 GASTROESOPHAGEAL REFLUX DISEASE WITHOUT ESOPHAGITIS: ICD-10-CM

## 2025-07-10 PROBLEM — N39.0 URINARY TRACT INFECTION WITHOUT HEMATURIA: Status: RESOLVED | Noted: 2025-04-08 | Resolved: 2025-07-10

## 2025-07-10 PROBLEM — H60.332 ACUTE SWIMMER'S EAR OF LEFT SIDE: Status: RESOLVED | Noted: 2025-06-26 | Resolved: 2025-07-10

## 2025-07-10 PROBLEM — R35.0 URINARY FREQUENCY: Status: RESOLVED | Noted: 2021-05-11 | Resolved: 2025-07-10

## 2025-07-10 PROBLEM — K21.00 GASTROESOPHAGEAL REFLUX DISEASE WITH ESOPHAGITIS: Status: RESOLVED | Noted: 2020-12-08 | Resolved: 2025-07-10

## 2025-07-10 PROBLEM — J06.9 VIRAL URI: Status: RESOLVED | Noted: 2017-08-22 | Resolved: 2025-07-10

## 2025-07-10 PROBLEM — M25.562 ACUTE PAIN OF LEFT KNEE: Status: RESOLVED | Noted: 2024-05-16 | Resolved: 2025-07-10

## 2025-07-10 PROBLEM — M54.50 CHRONIC BILATERAL LOW BACK PAIN WITHOUT SCIATICA: Status: RESOLVED | Noted: 2020-12-08 | Resolved: 2025-07-10

## 2025-07-10 PROBLEM — R35.89 POLYURIA: Status: RESOLVED | Noted: 2021-05-11 | Resolved: 2025-07-10

## 2025-07-10 PROBLEM — M21.42 PES PLANUS OF BOTH FEET: Status: RESOLVED | Noted: 2024-05-16 | Resolved: 2025-07-10

## 2025-07-10 PROBLEM — H93.8X1 EAR FULLNESS, RIGHT: Status: RESOLVED | Noted: 2018-06-19 | Resolved: 2025-07-10

## 2025-07-10 PROBLEM — K59.00 CONSTIPATION: Status: RESOLVED | Noted: 2018-04-19 | Resolved: 2025-07-10

## 2025-07-10 PROBLEM — G89.29 CHRONIC BILATERAL LOW BACK PAIN WITHOUT SCIATICA: Status: RESOLVED | Noted: 2020-12-08 | Resolved: 2025-07-10

## 2025-07-10 PROBLEM — K05.10 GINGIVITIS: Status: RESOLVED | Noted: 2020-12-08 | Resolved: 2025-07-10

## 2025-07-10 PROBLEM — R10.30 ABDOMINAL PAIN, LOWER: Status: RESOLVED | Noted: 2025-04-08 | Resolved: 2025-07-10

## 2025-07-10 PROBLEM — M21.41 PES PLANUS OF BOTH FEET: Status: RESOLVED | Noted: 2024-05-16 | Resolved: 2025-07-10

## 2025-07-10 PROBLEM — T78.2XXA ANAPHYLACTIC SYNDROME: Status: RESOLVED | Noted: 2023-06-16 | Resolved: 2025-07-10

## 2025-07-10 PROBLEM — M21.40 PES PLANUS: Status: RESOLVED | Noted: 2024-06-19 | Resolved: 2025-07-10

## 2025-07-10 LAB
ALBUMIN SERPL-MCNC: 4.4 G/DL (ref 3.5–5.2)
ALBUMIN/GLOB SERPL: 1.8 {RATIO} (ref 1–2.5)
ALP SERPL-CCNC: 56 U/L (ref 35–104)
ALT SERPL-CCNC: 20 U/L (ref 10–35)
ANION GAP SERPL CALCULATED.3IONS-SCNC: 12 MMOL/L (ref 9–16)
AST SERPL-CCNC: 25 U/L (ref 10–35)
BILIRUB SERPL-MCNC: 0.2 MG/DL (ref 0–1.2)
BUN SERPL-MCNC: 10 MG/DL (ref 8–23)
CALCIUM SERPL-MCNC: 9.8 MG/DL (ref 8.6–10.4)
CHLORIDE SERPL-SCNC: 102 MMOL/L (ref 98–107)
CHOLEST SERPL-MCNC: 213 MG/DL (ref 0–199)
CHOLESTEROL/HDL RATIO: 2.9
CO2 SERPL-SCNC: 25 MMOL/L (ref 20–31)
CREAT SERPL-MCNC: 0.7 MG/DL (ref 0.6–0.9)
ERYTHROCYTE [DISTWIDTH] IN BLOOD BY AUTOMATED COUNT: 12.7 % (ref 11.8–14.4)
EST. AVERAGE GLUCOSE BLD GHB EST-MCNC: 103 MG/DL
GFR, ESTIMATED: >90 ML/MIN/1.73M2
GLUCOSE SERPL-MCNC: 57 MG/DL (ref 74–99)
HAV IGM SERPL QL IA: NONREACTIVE
HBA1C MFR BLD: 5.2 % (ref 4–6)
HBV CORE IGM SERPL QL IA: NONREACTIVE
HBV SURFACE AG SERPL QL IA: NONREACTIVE
HCT VFR BLD AUTO: 38.9 % (ref 36.3–47.1)
HCV AB SERPL QL IA: NONREACTIVE
HDLC SERPL-MCNC: 74 MG/DL
HGB BLD-MCNC: 12.6 G/DL (ref 11.9–15.1)
LDLC SERPL CALC-MCNC: 117 MG/DL (ref 0–100)
MCH RBC QN AUTO: 30.7 PG (ref 25.2–33.5)
MCHC RBC AUTO-ENTMCNC: 32.4 G/DL (ref 28.4–34.8)
MCV RBC AUTO: 94.9 FL (ref 82.6–102.9)
NRBC BLD-RTO: 0 PER 100 WBC
PLATELET # BLD AUTO: 333 K/UL (ref 138–453)
PMV BLD AUTO: 9.5 FL (ref 8.1–13.5)
POTASSIUM SERPL-SCNC: 4.3 MMOL/L (ref 3.7–5.3)
PROT SERPL-MCNC: 6.8 G/DL (ref 6.6–8.7)
RBC # BLD AUTO: 4.1 M/UL (ref 3.95–5.11)
SODIUM SERPL-SCNC: 139 MMOL/L (ref 136–145)
T PALLIDUM AB SER QL IA: NONREACTIVE
TRIGL SERPL-MCNC: 110 MG/DL
TSH SERPL DL<=0.05 MIU/L-ACNC: 0.5 UIU/ML (ref 0.27–4.2)
VLDLC SERPL CALC-MCNC: 22 MG/DL (ref 1–30)
WBC OTHER # BLD: 5.6 K/UL (ref 3.5–11.3)

## 2025-07-10 PROCEDURE — G8420 CALC BMI NORM PARAMETERS: HCPCS | Performed by: FAMILY MEDICINE

## 2025-07-10 PROCEDURE — 1036F TOBACCO NON-USER: CPT | Performed by: FAMILY MEDICINE

## 2025-07-10 PROCEDURE — G8427 DOCREV CUR MEDS BY ELIG CLIN: HCPCS | Performed by: FAMILY MEDICINE

## 2025-07-10 PROCEDURE — 99214 OFFICE O/P EST MOD 30 MIN: CPT | Performed by: FAMILY MEDICINE

## 2025-07-10 PROCEDURE — 3023F SPIROM DOC REV: CPT | Performed by: FAMILY MEDICINE

## 2025-07-10 PROCEDURE — 3017F COLORECTAL CA SCREEN DOC REV: CPT | Performed by: FAMILY MEDICINE

## 2025-07-10 RX ORDER — ATORVASTATIN CALCIUM 20 MG/1
20 TABLET, FILM COATED ORAL DAILY
Qty: 90 TABLET | Refills: 1 | Status: SHIPPED | OUTPATIENT
Start: 2025-07-10

## 2025-07-10 RX ORDER — OMEPRAZOLE 40 MG/1
40 CAPSULE, DELAYED RELEASE ORAL DAILY
Qty: 90 CAPSULE | Refills: 1 | Status: SHIPPED | OUTPATIENT
Start: 2025-07-10

## 2025-07-10 ASSESSMENT — ENCOUNTER SYMPTOMS
GASTROINTESTINAL NEGATIVE: 1
ALLERGIC/IMMUNOLOGIC NEGATIVE: 1
COUGH: 1
EYES NEGATIVE: 1

## 2025-07-10 ASSESSMENT — COPD QUESTIONNAIRES: COPD: 1

## 2025-07-10 NOTE — PROGRESS NOTES
Subjective:      Patient ID: Laurel Harley is a 59 y.o. female.    COPD  She complains of cough. This is a chronic problem. The current episode started more than 1 month ago. The problem occurs intermittently. The problem has been waxing and waning. The cough is non-productive. Her symptoms are aggravated by emotional stress, change in weather and pollen. Her symptoms are alleviated by anxiolytics, beta-agonist and steroid inhaler. She reports moderate improvement on treatment. Risk factors: Former smoker. Her past medical history is significant for COPD and emphysema.       Review of Systems   Constitutional: Negative.    HENT: Negative.     Eyes: Negative.    Respiratory:  Positive for cough.    Cardiovascular: Negative.    Gastrointestinal: Negative.    Endocrine: Negative.    Musculoskeletal: Negative.    Skin: Negative.    Allergic/Immunologic: Negative.    Neurological: Negative.    Hematological: Negative.    Psychiatric/Behavioral:  Positive for behavioral problems and dysphoric mood.      Past family and social history unremarkable.   Diagnosis Orders   1. Encounter to establish care with new provider  Urine Drug Screen    HIV Rapid 1&2    T. pallidum Ab    Hepatitis Panel, Acute    CBC    Comprehensive Metabolic Panel    Hemoglobin A1C    Lipid Panel    TSH      2. Vargas's esophagus without dysplasia  Urine Drug Screen    HIV Rapid 1&2    T. pallidum Ab    Hepatitis Panel, Acute    CBC    Comprehensive Metabolic Panel    Hemoglobin A1C    Lipid Panel    TSH    omeprazole (PRILOSEC) 40 MG delayed release capsule      3. Gastroesophageal reflux disease without esophagitis        4. Chronic obstructive pulmonary disease, unspecified COPD type (HCC)        5. High cholesterol  Urine Drug Screen    HIV Rapid 1&2    T. pallidum Ab    Hepatitis Panel, Acute    CBC    Comprehensive Metabolic Panel    Hemoglobin A1C    Lipid Panel    TSH    atorvastatin (LIPITOR) 20 MG tablet      6. Bulimia nervosa, purging

## 2025-08-20 RX ORDER — UMECLIDINIUM 62.5 UG/1
1 AEROSOL, POWDER ORAL DAILY
Qty: 30 EACH | Refills: 5 | Status: SHIPPED | OUTPATIENT
Start: 2025-08-20

## (undated) DEVICE — CO2 CANNULA,SUPERSOFT, ADLT,7'O2,7'CO2: Brand: MEDLINE

## (undated) DEVICE — JELLY,LUBE,STERILE,FLIP TOP,TUBE,2-OZ: Brand: MEDLINE

## (undated) DEVICE — APPLICATOR MEDICATED 26 CC SOLUTION HI LT ORNG CHLORAPREP

## (undated) DEVICE — C-ARM: Brand: UNBRANDED

## (undated) DEVICE — RASP SURG L W0.27XL0.55IN TEAR CRSS CUT MIC RECIP SAW

## (undated) DEVICE — FORCEPS BX L240CM WRK CHN 2.8MM STD CAP W/ NDL MIC MESH

## (undated) DEVICE — SUTURE VIC + BR UD PS2 4-0 18IN VCP496G

## (undated) DEVICE — MEDICINE CUP, GRADUATED, STER: Brand: MEDLINE

## (undated) DEVICE — GOWN,SIRUS,NONRNF,SETINSLV,XL,20/CS: Brand: MEDLINE

## (undated) DEVICE — GLOVE SURG SZ 8 L12IN THK91MIL BRN LTX FREE POLYCHLOROPRENE

## (undated) DEVICE — GLOVE ORANGE PI 8   MSG9080

## (undated) DEVICE — BLOCK BITE 60FR RUBBER ADLT DENTAL

## (undated) DEVICE — GLOVE ORANGE PI 7 1/2   MSG9075

## (undated) DEVICE — CONMED DISPOSABLE GASTROINTESTINAL CYTOLOGY BRUSH, STRAIGHT HANDLE, 2.5 MM X 160 CM: Brand: CONMED

## (undated) DEVICE — CUP MED 1OZ CLR POLYPR FEED GRAD W/O LID

## (undated) DEVICE — THIN OFFSET (9.0 X 0.38 X 25.0MM)

## (undated) DEVICE — BITEBLOCK ENDOSCP 60FR MAXI WHT POLYETH STURDY W/ VELC WVN

## (undated) DEVICE — SVMMC POD PK

## (undated) DEVICE — SUTURE VIC + ABS BR UD X1 2-0 27IN VCP459H

## (undated) DEVICE — BASIN EMSIS 700ML GRAPHITE PLAS KID SHP GRAD

## (undated) DEVICE — STAZ ENDO KIT: Brand: MEDLINE INDUSTRIES, INC.

## (undated) DEVICE — ADAPTER TBNG LUER STUB 15 GA INTMED

## (undated) DEVICE — FORCEP BX MESH TOOTH MIC 2.8 MMX240 CM NDL STRL RADIAL JAW 4

## (undated) DEVICE — GLOVE SURG 7 11.7IN BEAD CUF LIGHT BRN SENSICARE LTX FREE

## (undated) DEVICE — GAUZE,SPONGE,4"X4",16PLY,STRL,LF,10/TRAY: Brand: MEDLINE

## (undated) DEVICE — SUTURE PROL SZ 4-0 L18IN NONABSORBABLE BLU L19MM PS-2 3/8 8682G

## (undated) DEVICE — GLOVE ORANGE PI 7   MSG9070

## (undated) DEVICE — GLOVE ORTHO 8   MSG9480

## (undated) DEVICE — INTENDED FOR TISSUE SEPARATION, AND OTHER PROCEDURES THAT REQUIRE A SHARP SURGICAL BLADE TO PUNCTURE OR CUT.: Brand: BARD-PARKER ® CARBON RIB-BACK BLADES

## (undated) DEVICE — PADDING,UNDERCAST,COTTON, 4"X4YD STERILE: Brand: MEDLINE

## (undated) DEVICE — NEEDLE HYPO 25GA L1.5IN BLU POLYPR HUB S STL REG BVL STR

## (undated) DEVICE — Device: Brand: DEFENDO VALVE AND CONNECTOR KIT

## (undated) DEVICE — BANDAGE,GAUZE,BULKEE II,4.5"X4.1YD,STRL: Brand: MEDLINE